# Patient Record
Sex: FEMALE | Race: WHITE | NOT HISPANIC OR LATINO | Employment: PART TIME | URBAN - METROPOLITAN AREA
[De-identification: names, ages, dates, MRNs, and addresses within clinical notes are randomized per-mention and may not be internally consistent; named-entity substitution may affect disease eponyms.]

---

## 2017-01-31 ENCOUNTER — ALLSCRIPTS OFFICE VISIT (OUTPATIENT)
Dept: OTHER | Facility: OTHER | Age: 32
End: 2017-01-31

## 2017-01-31 LAB
BILIRUB UR QL STRIP: NORMAL
CLARITY UR: NORMAL
COLOR UR: NORMAL
GLUCOSE (HISTORICAL): NEGATIVE
HGB UR QL STRIP.AUTO: NEGATIVE
KETONES UR STRIP-MCNC: 80 MG/DL
LEUKOCYTE ESTERASE UR QL STRIP: NORMAL
NITRITE UR QL STRIP: NEGATIVE
PH UR STRIP.AUTO: 5 [PH]
PROT UR STRIP-MCNC: NORMAL MG/DL
SP GR UR STRIP.AUTO: 1.01
UROBILINOGEN UR QL STRIP.AUTO: 0.2

## 2017-02-13 ENCOUNTER — GENERIC CONVERSION - ENCOUNTER (OUTPATIENT)
Dept: OTHER | Facility: OTHER | Age: 32
End: 2017-02-13

## 2017-02-27 ENCOUNTER — GENERIC CONVERSION - ENCOUNTER (OUTPATIENT)
Dept: OTHER | Facility: OTHER | Age: 32
End: 2017-02-27

## 2017-02-27 DIAGNOSIS — Z34.92 ENCOUNTER FOR SUPERVISION OF NORMAL PREGNANCY IN SECOND TRIMESTER: ICD-10-CM

## 2017-02-27 DIAGNOSIS — Z91.89 OTHER SPECIFIED PERSONAL RISK FACTORS, NOT ELSEWHERE CLASSIFIED: ICD-10-CM

## 2017-02-27 DIAGNOSIS — D69.9 HEMORRHAGIC CONDITION (HCC): ICD-10-CM

## 2017-02-27 DIAGNOSIS — N93.9 ABNORMAL UTERINE AND VAGINAL BLEEDING, UNSPECIFIED: ICD-10-CM

## 2017-02-27 PROCEDURE — G0143 SCR C/V CYTO,THINLAYER,RESCR: HCPCS | Performed by: OBSTETRICS & GYNECOLOGY

## 2017-02-27 PROCEDURE — 87624 HPV HI-RISK TYP POOLED RSLT: CPT | Performed by: OBSTETRICS & GYNECOLOGY

## 2017-02-28 ENCOUNTER — LAB REQUISITION (OUTPATIENT)
Dept: LAB | Facility: HOSPITAL | Age: 32
End: 2017-02-28
Payer: COMMERCIAL

## 2017-02-28 DIAGNOSIS — Z34.92 ENCOUNTER FOR SUPERVISION OF NORMAL PREGNANCY IN SECOND TRIMESTER: ICD-10-CM

## 2017-02-28 DIAGNOSIS — Z91.89 OTHER SPECIFIED PERSONAL RISK FACTORS, NOT ELSEWHERE CLASSIFIED: ICD-10-CM

## 2017-03-02 ENCOUNTER — GENERIC CONVERSION - ENCOUNTER (OUTPATIENT)
Dept: OTHER | Facility: OTHER | Age: 32
End: 2017-03-02

## 2017-03-02 LAB — HPV RRNA GENITAL QL NAA+PROBE: NORMAL

## 2017-03-06 LAB
LAB AP GYN PRIMARY INTERPRETATION: NORMAL
Lab: NORMAL

## 2017-03-09 ENCOUNTER — GENERIC CONVERSION - ENCOUNTER (OUTPATIENT)
Dept: OTHER | Facility: OTHER | Age: 32
End: 2017-03-09

## 2017-03-16 ENCOUNTER — GENERIC CONVERSION - ENCOUNTER (OUTPATIENT)
Dept: OTHER | Facility: OTHER | Age: 32
End: 2017-03-16

## 2017-03-23 ENCOUNTER — GENERIC CONVERSION - ENCOUNTER (OUTPATIENT)
Dept: OTHER | Facility: OTHER | Age: 32
End: 2017-03-23

## 2017-03-27 ENCOUNTER — ALLSCRIPTS OFFICE VISIT (OUTPATIENT)
Dept: OTHER | Facility: OTHER | Age: 32
End: 2017-03-27

## 2017-04-10 ENCOUNTER — GENERIC CONVERSION - ENCOUNTER (OUTPATIENT)
Dept: OTHER | Facility: OTHER | Age: 32
End: 2017-04-10

## 2017-04-17 ENCOUNTER — ALLSCRIPTS OFFICE VISIT (OUTPATIENT)
Dept: OTHER | Facility: OTHER | Age: 32
End: 2017-04-17

## 2017-11-13 ENCOUNTER — ALLSCRIPTS OFFICE VISIT (OUTPATIENT)
Dept: OTHER | Facility: OTHER | Age: 32
End: 2017-11-13

## 2017-11-13 LAB
BACTERIA UR QL AUTO: NORMAL
CLUE CELL (HISTORICAL): NORMAL
HYPHAL YEAST (HISTORICAL): NORMAL
KOH PREP (HISTORICAL): NORMAL
TRICHOMONAS (HISTORICAL): NORMAL

## 2017-11-13 PROCEDURE — 87491 CHLMYD TRACH DNA AMP PROBE: CPT | Performed by: NURSE PRACTITIONER

## 2017-11-13 PROCEDURE — 87591 N.GONORRHOEAE DNA AMP PROB: CPT | Performed by: NURSE PRACTITIONER

## 2017-11-14 ENCOUNTER — LAB REQUISITION (OUTPATIENT)
Dept: LAB | Facility: HOSPITAL | Age: 32
End: 2017-11-14
Payer: COMMERCIAL

## 2017-11-14 DIAGNOSIS — Z11.3 ENCOUNTER FOR SCREENING FOR INFECTIONS WITH PREDOMINANTLY SEXUAL MODE OF TRANSMISSION: ICD-10-CM

## 2017-11-15 LAB
CHLAMYDIA DNA CVX QL NAA+PROBE: NORMAL
N GONORRHOEA DNA GENITAL QL NAA+PROBE: NORMAL

## 2018-01-11 NOTE — MISCELLANEOUS
Message   Recorded as Task   Date: 2017 09:55 AM, Created By: Elias Lai   Task Name: Care Coordination   Assigned To: [address] Team   Regarding Patient: Elliot Hernandez, Status: Active   CommentEvesanjuana Millershakira - 28 Mar 2017 9:55 AM     TASK CREATED  [facility] wants to schedule refill of Noank  Please call them back at [phone]   G V  (DEBBIE) [facility] - 2017 11:28 AM     TASK REASSIGNED: Previously Assigned To Mark 91 - 2017 09:55 AM  TASK CREATED  [facility] wants to schedule refill of Noank  Please call them back at [phone]   Billy - 10 Apr 2017 8:16 AM     TASK EDITED  p  c  to [facility] -informed that pt had a miscarriage & will no longer be needing Noank  Active Problems    1  Abnormal vaginal bleeding (623 8) (N93 9)   2  Bacterial vaginosis (616 10,041 9) (N76 0,B96 89)   3  Bleeding diathesis (287 9) (D69 9)   4  Cervical cancer screening (V76 2) (Z12 4)   5  Encounter for initial prescription of contraceptive pills (V25 01) (Z30 011)   6  Encounter for well woman exam (V72 31) (Z01 419)   7  History of  labor (V13 21) (Z87 51)   8  Irregular menstrual cycle (626 4) (N92 6)   9  Potential for bleeding (V49 89) (Z91 89)   10  Prenatal care in second trimester (V22 1) (Z34 92)   11   delivery, delivered (644 21) (O60 10X0)   12  Routine screening for STI (sexually transmitted infection) (V74 5) (Z11 3)    Current Meds   1  Prenatal TABS; Therapy: (Recorded:[id number]) to Recorded    Allergies    1  Latex Exam Gloves MISC    2   Latex    Signatures   Electronically signed by : Preston Estes RN; Apr 10 2017  8:16AM EST                       (Author)

## 2018-01-12 NOTE — RESULT NOTES
Message  Pap done at Dr Solorio [place] office 9/22/2015 WNL with negative HR HPV  Last annual 5/2016 here, reviewed with pt due for her next annual here 5/2017        Signatures   Electronically signed by : NIA Lopes; Sep 28 2016  6:28PM EST                       (Author)

## 2018-01-13 VITALS
HEART RATE: 100 BPM | DIASTOLIC BLOOD PRESSURE: 83 MMHG | SYSTOLIC BLOOD PRESSURE: 126 MMHG | BODY MASS INDEX: 35.06 KG/M2 | WEIGHT: 179.5 LBS

## 2018-01-13 VITALS
DIASTOLIC BLOOD PRESSURE: 72 MMHG | RESPIRATION RATE: 16 BRPM | WEIGHT: 169.5 LBS | HEIGHT: 62 IN | BODY MASS INDEX: 31.19 KG/M2 | HEART RATE: 92 BPM | SYSTOLIC BLOOD PRESSURE: 109 MMHG

## 2018-01-13 VITALS
HEART RATE: 83 BPM | SYSTOLIC BLOOD PRESSURE: 125 MMHG | DIASTOLIC BLOOD PRESSURE: 76 MMHG | BODY MASS INDEX: 32.71 KG/M2 | WEIGHT: 167.5 LBS

## 2018-01-14 VITALS
DIASTOLIC BLOOD PRESSURE: 73 MMHG | HEART RATE: 90 BPM | SYSTOLIC BLOOD PRESSURE: 139 MMHG | WEIGHT: 185 LBS | BODY MASS INDEX: 33.84 KG/M2

## 2018-01-18 NOTE — MISCELLANEOUS
Message  Disha to be delivered on 3/7 by Og Jackson- [phone]  Patient appt for 3/9 for injection  Patient to sign and return paperwork in pre-paid envelope  Active Problems    1  Bacterial vaginosis (616 10,041 9) (N76 0,B96 89)   2  Cervical cancer screening (V76 2) (Z12 4)   3  Encounter for initial prescription of contraceptive pills (V25 01) (Z30 011)   4  Encounter for well woman exam (V72 31) (Z01 419)   5  Irregular menstrual cycle (626 4) (N92 6)   6  Potential for bleeding (V49 89) (Z91 89)   7  Prenatal care in second trimester (V22 1) (Z34 92)   8  Routine screening for STI (sexually transmitted infection) (V74 5) (Z11 3)    Current Meds   1  Prenatal TABS; Therapy: (Recorded:[id number]) to Recorded    Allergies    1  Latex Exam Gloves MISC    2   Latex    Signatures   Electronically signed by : Beatrice Fair, ; Mar  2 2017 11:42AM EST                       (Author)

## 2018-01-22 VITALS
DIASTOLIC BLOOD PRESSURE: 72 MMHG | HEIGHT: 62 IN | BODY MASS INDEX: 31.01 KG/M2 | WEIGHT: 168.5 LBS | RESPIRATION RATE: 16 BRPM | SYSTOLIC BLOOD PRESSURE: 132 MMHG | HEART RATE: 92 BPM

## 2018-01-22 VITALS
DIASTOLIC BLOOD PRESSURE: 74 MMHG | HEART RATE: 95 BPM | SYSTOLIC BLOOD PRESSURE: 110 MMHG | BODY MASS INDEX: 32.21 KG/M2 | WEIGHT: 176.13 LBS

## 2018-01-22 VITALS
WEIGHT: 171 LBS | SYSTOLIC BLOOD PRESSURE: 94 MMHG | HEIGHT: 62 IN | DIASTOLIC BLOOD PRESSURE: 60 MMHG | BODY MASS INDEX: 31.47 KG/M2

## 2018-01-22 VITALS
HEART RATE: 101 BPM | BODY MASS INDEX: 32.08 KG/M2 | WEIGHT: 175.38 LBS | SYSTOLIC BLOOD PRESSURE: 124 MMHG | DIASTOLIC BLOOD PRESSURE: 66 MMHG

## 2018-01-22 VITALS
BODY MASS INDEX: 33.01 KG/M2 | SYSTOLIC BLOOD PRESSURE: 122 MMHG | DIASTOLIC BLOOD PRESSURE: 82 MMHG | WEIGHT: 180.5 LBS | HEART RATE: 83 BPM

## 2018-03-07 NOTE — PROGRESS NOTES
Education  [employer] Education 1st Trimester:   First Trimester Education provided:   benefits of breastfeeding, importance of exclusive breastfeeding, early initiation of breastfeeding, exclusive breastfeeding for the first 6 months and Pregnancy Essentials Reference Guide given   The patient is planning on breastfeeding     Prenatal education provided by: Tam Henley   Electronically signed by : Lakisha Enciso, ; Dec 19 2016  2:52PM EST                       (Author)

## 2018-05-31 ENCOUNTER — OFFICE VISIT (OUTPATIENT)
Dept: OBGYN CLINIC | Facility: CLINIC | Age: 33
End: 2018-05-31
Payer: COMMERCIAL

## 2018-05-31 VITALS
BODY MASS INDEX: 37.62 KG/M2 | HEIGHT: 60 IN | WEIGHT: 191.6 LBS | SYSTOLIC BLOOD PRESSURE: 131 MMHG | DIASTOLIC BLOOD PRESSURE: 83 MMHG | HEART RATE: 78 BPM

## 2018-05-31 DIAGNOSIS — Z30.2 REQUEST FOR STERILIZATION: Primary | ICD-10-CM

## 2018-05-31 PROCEDURE — 99213 OFFICE O/P EST LOW 20 MIN: CPT | Performed by: NURSE PRACTITIONER

## 2018-05-31 NOTE — PROGRESS NOTES
Assessment/Plan:         Diagnoses and all orders for this visit:    Request for sterilization         Bilateral tuballigation consent signed today  Information on Sterilization given to pt  Patient does not want any more children  History of  delivery x2  Subjective:      Patient ID: Tawana Sarmiento is a 34 y o  female  HPI 34year old [id number] here to review contraception  She desires a tubal ligation, she does not want any more children  Currently she is not in a relationship  Patient has history of a  PTD delivery at 21 weeks  on 3/20/2017 and another at 19 wks gestation  She has hx of migraine hA's and hx of OCP use that  made her HA's worse  Menses are currently very regular and last 5 days  Her LMP was 2018  Patient is due for her annual gyn exam 2018  Last pap was on 2017 and was negative with negative HR HPV  The following portions of the patient's history were reviewed and updated as appropriate: allergies, current medications, past family history, past medical history, past social history, past surgical history and problem list     Review of Systems   Respiratory: Negative  Cardiovascular: Negative  Genitourinary: Negative for menstrual problem, pelvic pain and vaginal discharge  Neurological: Negative  Objective:      /83 (BP Location: Left arm, Patient Position: Sitting, Cuff Size: Adult)   Pulse 78   Ht 5' (1 524 m)   Wt 86 9 kg (191 lb 9 6 oz)   LMP 2018 (Exact Date)   Breastfeeding? No   BMI 37 42 kg/m²          Physical Exam   Constitutional: She appears well-nourished  Cardiovascular: Normal rate, regular rhythm and normal heart sounds      Pulmonary/Chest: Breath sounds normal

## 2018-05-31 NOTE — PATIENT INSTRUCTIONS
You will receive a phone call from the [facility] in  [facility] [place] about scheduling your tubal   Annual exam is due 11/2018

## 2019-10-24 ENCOUNTER — ANNUAL EXAM (OUTPATIENT)
Dept: OBGYN CLINIC | Facility: CLINIC | Age: 34
End: 2019-10-24

## 2019-10-24 VITALS
BODY MASS INDEX: 36.32 KG/M2 | DIASTOLIC BLOOD PRESSURE: 73 MMHG | SYSTOLIC BLOOD PRESSURE: 120 MMHG | HEART RATE: 83 BPM | HEIGHT: 60 IN | WEIGHT: 185 LBS

## 2019-10-24 DIAGNOSIS — Z20.2 POSSIBLE EXPOSURE TO STD: ICD-10-CM

## 2019-10-24 DIAGNOSIS — Z01.419 ENCOUNTER FOR GYNECOLOGICAL EXAMINATION (GENERAL) (ROUTINE) WITHOUT ABNORMAL FINDINGS: Primary | ICD-10-CM

## 2019-10-24 DIAGNOSIS — Z30.013 ENCOUNTER FOR INITIAL PRESCRIPTION OF INJECTABLE CONTRACEPTIVE: ICD-10-CM

## 2019-10-24 PROCEDURE — 99395 PREV VISIT EST AGE 18-39: CPT | Performed by: OBSTETRICS & GYNECOLOGY

## 2019-10-24 PROCEDURE — 87591 N.GONORRHOEAE DNA AMP PROB: CPT | Performed by: OBSTETRICS & GYNECOLOGY

## 2019-10-24 PROCEDURE — 87491 CHLMYD TRACH DNA AMP PROBE: CPT | Performed by: OBSTETRICS & GYNECOLOGY

## 2019-10-24 RX ORDER — MEDROXYPROGESTERONE ACETATE 150 MG/ML
150 INJECTION, SUSPENSION INTRAMUSCULAR
Qty: 1 ML | Refills: 1 | Status: SHIPPED | OUTPATIENT
Start: 2019-10-24 | End: 2020-01-23

## 2019-10-24 RX ORDER — BUTALBITAL, ASPIRIN, AND CAFFEINE 50; 325; 40 MG/1; MG/1; MG/1
CAPSULE ORAL
Refills: 0 | COMMUNITY
Start: 2019-09-17 | End: 2020-01-29

## 2019-10-24 RX ORDER — TOPIRAMATE 25 MG/1
TABLET ORAL
Refills: 0 | COMMUNITY
Start: 2019-09-17 | End: 2020-01-29

## 2019-10-24 NOTE — PROGRESS NOTES
ASSESSMENT & PLAN: Yanni Goldstein is a 27 y o  No obstetric history on file  with {NORMAL/ABNORMAL XAZX:94417} gynecologic exam     1   Routine well woman exam done today  2  Pap and HPV:  The patient's last pap and hpv was  19***  It was negative{ NORMAL/ABNORMAL ONLY:52672}  Pap and cotesting {DONE/NOT DONE:4845345367::"was not"} done today  Current ASCCP Guidelines reviewed  ***  3   The following were reviewed in today's visit: {Gyn counselin}  4  ***    CC:  Annual Gynecologic Examination    HPI: Yanni Goldstein is a 27 y o  No obstetric history on file  who presents for annual gynecologic examination  She has hx of PTD at 21 wks and another at 19 wks  At last visit she wanted to get a Tubal   She has the following concerns:  ***    Health Maintenance:    She wears her seatbelt routinely  She {DOES/DOES TJX:48449} perform {Desc; regular/irre} monthly self breast exams  She feels safe at home  Past Medical History:   Diagnosis Date    Gallbladder attack     Kidney stones     Migraines        Past Surgical History:   Procedure Laterality Date    LITHOTRIPSY      TONSILLECTOMY         Past OB/Gyn History:  OB History    None       Pt {HAS/DOES NOT HAVE:03205} menstrual issues  ***   History of sexually transmitted infection: {YES/NO:69627}  History of abnormal pap smears: {YES/NO:69853} ***  Patient {IS/ IS NOT:54242} currently sexually active  {Sexual social history md:55251}  The current method of family planning is {contraception:863812}      Family History   Problem Relation Age of Onset    No Known Problems Mother     No Known Problems Father        Social History:  Social History     Socioeconomic History    Marital status: Single     Spouse name: Not on file    Number of children: Not on file    Years of education: Not on file    Highest education level: Not on file   Occupational History    Not on file   Social Needs    Financial resource strain: Not on file    Food insecurity:     Worry: Not on file     Inability: Not on file    Transportation needs:     Medical: Not on file     Non-medical: Not on file   Tobacco Use    Smoking status: Former Smoker    Smokeless tobacco: Never Used   Substance and Sexual Activity    Alcohol use: No    Drug use: No    Sexual activity: Never     Partners: Male     Birth control/protection: None   Lifestyle    Physical activity:     Days per week: Not on file     Minutes per session: Not on file    Stress: Not on file   Relationships    Social connections:     Talks on phone: Not on file     Gets together: Not on file     Attends Yazdanism service: Not on file     Active member of club or organization: Not on file     Attends meetings of clubs or organizations: Not on file     Relationship status: Not on file    Intimate partner violence:     Fear of current or ex partner: Not on file     Emotionally abused: Not on file     Physically abused: Not on file     Forced sexual activity: Not on file   Other Topics Concern    Not on file   Social History Narrative    Not on file     Presently lives with ***  Patient is {Desc; marital status:62}  Patient is currently {[facility] Employment:92187} ***    Allergies   Allergen Reactions    Latex Anaphylaxis, Hives and Anxiety     Category: Allergy; Annotation - [id number]: HIVES       No current outpatient medications on file  Review of Systems  Constitutional :no fever, feels well, no tiredness, no recent weight gain or loss  ENT: no ear ache, no loss of hearing, no nosebleeds or nasal discharge, no sore throat or hoarseness  Cardiovascular: no complaints of slow or fast heart beat, no chest pain, no palpitations, no leg claudication or lower extremity edema    Respiratory: no complaints of shortness of shortness of breath, no JACOBS  Breasts:no complaints of breast pain, breast lump, or nipple discharge  Gastrointestinal: no complaints of abdominal pain, constipation, nausea, vomiting, or diarrhea or bloody stools  Genitourinary : no complaints of dysuria, incontinence, pelvic pain, no dysmenorrhea, vaginal discharge or abnormal vaginal bleeding and as noted in HPI  Musculoskeletal: no complaints of arthralgia, no myalgia, no joint swelling or stiffness, no limb pain or swelling  Integumentary: no complaints of skin rash or lesion, itching or dry skin  Neurological: no complaints of headache, no confusion, no numbness or tingling, no dizziness or fainting    Objective      There were no vitals taken for this visit  General:   appears stated age, cooperative, alert normal mood and affect   Neck: normal, supple,trachea midline, no masses   Heart: regular rate and rhythm, S1, S2 normal, no murmur, click, rub or gallop   Lungs: clear to auscultation bilaterally   Breasts: {EXAM; BREAST:36070}   Abdomen: soft, non-tender, without masses or organomegaly   Vulva: {EXAM; GYN VDKSL:91883}   Vagina: {exam; vagina:75962}   Urethra: {Urethra:91144}   Cervix: {PE Cervix:62190::"Normal, no discharge "}   Uterus: {exam; uterus:80915}   Adnexa: {exam; adnexa:45897}   Lymphatic palpation of lymph nodes in neck, axilla, groin and/or other locations: no lymphadenopathy or masses noted   Skin normal skin turgor and no rashes     Psychiatric orientation to person, place, and time: normal  mood and affect: normal

## 2019-10-24 NOTE — PROGRESS NOTES
Assessment/Plan:     No problem-specific Assessment & Plan notes found for this encounter  Diagnoses and all orders for this visit:    Encounter for gynecological examination (general) (routine) without abnormal findings    Possible exposure to STD  -     Chlamydia/GC amplified DNA by PCR; Future    Encounter for initial prescription of injectable contraceptive  -     medroxyPROGESTERone (DEPO-PROVERA) 150 mg/mL injection; Inject 1 mL (150 mg total) into a muscle every 3 (three) months    Other orders  -     butalbital-aspirin-caffeine (FIORINAL) -40 mg capsule  -     topiramate (TOPAMAX) 25 mg tablet    Depression Screening Follow-up Plan: Patient's depression screening was positive with a PHQ-2 score of 0  Their PHQ-9 score was   Clinically patient does not have depression  No treatment is required  RTO for DMPA injection  Subjective:      Patient ID: Kym Benton is a 27 y o  female presents for annual exam   Her only complaint today is migraine headache for which she has a neurologist   Her pap smear was 2/27/17 and was negative with negative HPV  She agrees to STD testing  She desires a tubal ligation in the future but wants interval contraception today  HPI    The following portions of the patient's history were reviewed and updated as appropriate: allergies, current medications, past family history, past medical history, past social history, past surgical history and problem list     Review of Systems      Objective:      /73 (BP Location: Left arm, Patient Position: Sitting, Cuff Size: Adult)   Pulse 83   Ht 5' (1 524 m)   Wt 83 9 kg (185 lb)   LMP 10/08/2019   BMI 36 13 kg/m²          Physical Exam   Constitutional: She is oriented to person, place, and time  She appears well-developed and well-nourished  No distress  HENT:   Head: Normocephalic  Neck: No thyromegaly present  Cardiovascular: Normal rate, regular rhythm and normal heart sounds     No murmur heard  Pulmonary/Chest: Effort normal and breath sounds normal  No respiratory distress  She has no wheezes  She has no rales  She exhibits no tenderness  No breast tenderness, discharge or bleeding  Abdominal: Soft  Bowel sounds are normal  She exhibits no distension and no mass  There is no tenderness  There is no rebound and no guarding  Genitourinary: Uterus normal  Rectal exam shows no external hemorrhoid  No breast tenderness, discharge or bleeding  No labial fusion  There is no rash, tenderness, lesion or injury on the right labia  There is no rash, tenderness, lesion or injury on the left labia  Cervix exhibits no motion tenderness, no discharge and no friability  Right adnexum displays no mass, no tenderness and no fullness  Left adnexum displays no mass, no tenderness and no fullness  Musculoskeletal: She exhibits no edema  Lymphadenopathy:        Right: No inguinal adenopathy present  Left: No inguinal adenopathy present  Neurological: She is alert and oriented to person, place, and time  Skin: Skin is warm and dry  Psychiatric: She has a normal mood and affect  Her behavior is normal  Judgment and thought content normal    Nursing note and vitals reviewed

## 2019-10-25 ENCOUNTER — CLINICAL SUPPORT (OUTPATIENT)
Dept: OBGYN CLINIC | Facility: CLINIC | Age: 34
End: 2019-10-25

## 2019-10-25 DIAGNOSIS — Z30.42 ENCOUNTER FOR DEPO-PROVERA CONTRACEPTION: Primary | ICD-10-CM

## 2019-10-25 LAB — SL AMB POCT URINE HCG: NEGATIVE

## 2019-10-25 PROCEDURE — 96372 THER/PROPH/DIAG INJ SC/IM: CPT

## 2019-10-25 PROCEDURE — 81025 URINE PREGNANCY TEST: CPT

## 2019-10-25 RX ORDER — MEDROXYPROGESTERONE ACETATE 150 MG/ML
150 INJECTION, SUSPENSION INTRAMUSCULAR ONCE
Status: COMPLETED | OUTPATIENT
Start: 2019-10-25 | End: 2019-10-25

## 2019-10-25 RX ADMIN — MEDROXYPROGESTERONE ACETATE 150 MG: 150 INJECTION, SUSPENSION INTRAMUSCULAR at 13:16

## 2019-10-25 NOTE — PROGRESS NOTES
Depo-Provera      [x]   Patient provided box    o 1 Refills remain   Last  Annual Date: 10/24/19   Last Depo date: Today is 1st injection   Side effects: no   HCG: yes  o if applicable: negative   Given by: Kelly Webb MA   Site: Left deltoid     Calcium supplement daily teaching, condoms for 2 weeks following first injection dose

## 2019-10-25 NOTE — PATIENT INSTRUCTIONS
Medroxyprogesterone (By injection)   Medroxyprogesterone (om-vctb-fb-proe-KO-ter-one)  Prevents pregnancy  Also treats endometriosis and is used with other medicines to help relieve symptoms of cancer, including uterine or kidney cancer  Brand Name(s): Depo-Provera, Depo-Provera Contraceptive, Depo-SubQ Provera 104   There may be other brand names for this medicine  When This Medicine Should Not Be Used: This medicine is not right for everyone  You should not receive it if you had an allergic reaction to medroxyprogesterone or if you have a history of breast cancer or blood clots (including heart attack or stroke)  In most cases, you should not use this medicine while you are pregnant  How to Use This Medicine:   Injectable  · A nurse or other health provider will give you this medicine  This medicine is given as a shot into a muscle or just under the skin  · Your exact treatment schedule depends on the reason you are using this medicine  You doctor will explain your personal schedule  ¨ For treatment of cancer symptoms, you may start with a shot once per week  You may need fewer shots as your treatment goes forward  ¨ For birth control or endometriosis, you will need a shot every 3 months (13 weeks)  Read and follow the patient instructions that come with this medicine  Talk to your doctor or pharmacist if you have any questions  ¨ You might need to have the first shot during the first 5 days of your normal menstrual period, to make sure you are not pregnant  If you have just had a baby, you may receive a shot 5 days after birth if you are not breastfeeding or 6 weeks after birth if you are breastfeeding  · Missed dose: You must receive a shot every 3 months if you want to prevent pregnancy  Talk to your doctor or pharmacist if you do not receive your medicine on time, because you may need another form of birth control    Drugs and Foods to Avoid:   Ask your doctor or pharmacist before using any other medicine, including over-the-counter medicines, vitamins, and herbal products  · Some foods and medicines can affect how medroxyprogesterone works  Tell your doctor if you are using any of the following:  ¨ Aminoglutethimide, bosentan, carbamazepine, felbamate, griseofulvin, nefazodone, oxcarbazepine, phenobarbital, phenytoin, rifabutin, rifampin, rifapentine, Megan's wort, topiramate  ¨ Medicine to treat an infection (including clarithromycin, itraconazole, ketoconazole, telithromycin, voriconazole)  ¨ Medicine to treat HIV/AIDS (including atazanavir, indinavir, nelfinavir, ritonavir, saquinavir)  Warnings While Using This Medicine:   · Tell your doctor right away if you think you have become pregnant  · Tell your doctor if you are breastfeeding or if you have liver disease, kidney disease, asthma, diabetes, heart disease, seizures, migraine headaches, an eating disorder, osteoporosis, or a history of depression  Tell your doctor if you smoke  · This medicine may cause the following problems:  ¨ Blood clots, which could lead to stroke, heart attack, or other serious problems  ¨ Possible increased risk of breast cancer  ¨ Weak or thin bones, especially with long-term use  · You should not use this medicine for long-term birth control unless you cannot use any other form of birth control  · This medicine will not protect you from HIV/AIDS or other sexually transmitted diseases  · Tell any doctor or dentist who treats you that you are using this medicine  This medicine may affect certain medical test results  · Your doctor will check your progress and the effects of this medicine at regular visits  Keep all appointments    Possible Side Effects While Using This Medicine:   Call your doctor right away if you notice any of these side effects:  · Allergic reaction: Itching or hives, swelling in your face or hands, swelling or tingling in your mouth or throat, chest tightness, trouble breathing  · Chest pain, trouble breathing, or coughing up blood  · Dark urine or pale stools, nausea, vomiting, loss of appetite, stomach pain, yellow skin or eyes  · Heavy or nonstop vaginal bleeding  · Loss of vision, double vision  · Numbness or weakness on one side of your body, sudden or severe headache, problems with vision, speech, or walking  · Severe stomach pain or cramps  If you notice these less serious side effects, talk with your doctor:   · Headache  · Light or missed monthly periods, spotting between periods  · Nervousness or dizziness  · Pain, redness, burning, swelling, or a lump under your skin where the shot was given  · Weight gain  If you notice other side effects that you think are caused by this medicine, tell your doctor  Call your doctor for medical advice about side effects  You may report side effects to FDA at 6-068-FDA-7194  © 2017 [address] Information is for End User's use only and may not be sold, redistributed or otherwise used for commercial purposes  The above information is an  only  It is not intended as medical advice for individual conditions or treatments  Talk to your doctor, nurse or pharmacist before following any medical regimen to see if it is safe and effective for you

## 2019-10-28 LAB
C TRACH DNA SPEC QL NAA+PROBE: NEGATIVE
N GONORRHOEA DNA SPEC QL NAA+PROBE: NEGATIVE

## 2019-10-30 ENCOUNTER — TELEPHONE (OUTPATIENT)
Dept: OBGYN CLINIC | Facility: CLINIC | Age: 34
End: 2019-10-30

## 2020-01-10 ENCOUNTER — TELEPHONE (OUTPATIENT)
Dept: OBGYN CLINIC | Facility: CLINIC | Age: 35
End: 2020-01-10

## 2020-01-21 ENCOUNTER — TELEPHONE (OUTPATIENT)
Dept: OBGYN CLINIC | Facility: CLINIC | Age: 35
End: 2020-01-21

## 2020-01-23 ENCOUNTER — ULTRASOUND (OUTPATIENT)
Dept: OBGYN CLINIC | Facility: CLINIC | Age: 35
End: 2020-01-23

## 2020-01-23 VITALS
SYSTOLIC BLOOD PRESSURE: 121 MMHG | BODY MASS INDEX: 34.96 KG/M2 | WEIGHT: 179 LBS | DIASTOLIC BLOOD PRESSURE: 83 MMHG | HEART RATE: 93 BPM

## 2020-01-23 DIAGNOSIS — Z32.01 POSITIVE PREGNANCY TEST: Primary | ICD-10-CM

## 2020-01-23 PROCEDURE — 99213 OFFICE O/P EST LOW 20 MIN: CPT | Performed by: OBSTETRICS & GYNECOLOGY

## 2020-01-23 NOTE — PROGRESS NOTES
Assessment/Plan:     No problem-specific Assessment & Plan notes found for this encounter  Diagnoses and all orders for this visit:    Positive pregnancy test  -     Ambulatory Referral to Maternal Fetal Medicine; Future    Formal US per MFM  Will take OTC Flinstones vitamins  RTO for OB intake  Subjective:      Patient ID: Maria A Grady is a 27 y o  female  presents with a positive home pregnancy test   Pt was on DMPA and had a negative urine HCG prior to administration  She felt nauseous at home which prompted her to take a home pregnancy test   Pt feels well overall and declines anti-emetics  TA US reveals a single viable IUP in the second trimester, about 15 wks    Will send to [facility] for formal US  HPI    The following portions of the patient's history were reviewed and updated as appropriate: allergies, current medications, past family history, past medical history, past social history, past surgical history and problem list     Review of Systems      Objective:      /83 (BP Location: Left arm, Patient Position: Sitting, Cuff Size: Adult)   Pulse 93   Wt 81 2 kg (179 lb)   LMP 10/08/2019   BMI 34 96 kg/m²          Physical Exam   Constitutional: She is oriented to person, place, and time  She appears well-developed and well-nourished  No distress  Pulmonary/Chest: Effort normal    Neurological: She is alert and oriented to person, place, and time  Psychiatric: She has a normal mood and affect  Her behavior is normal    Nursing note and vitals reviewed

## 2020-01-24 ENCOUNTER — TRANSCRIBE ORDERS (OUTPATIENT)
Dept: LAB | Facility: CLINIC | Age: 35
End: 2020-01-24

## 2020-01-24 ENCOUNTER — APPOINTMENT (OUTPATIENT)
Dept: LAB | Facility: CLINIC | Age: 35
End: 2020-01-24
Payer: COMMERCIAL

## 2020-01-24 ENCOUNTER — ULTRASOUND (OUTPATIENT)
Dept: PERINATAL CARE | Facility: OTHER | Age: 35
End: 2020-01-24
Payer: COMMERCIAL

## 2020-01-24 VITALS
SYSTOLIC BLOOD PRESSURE: 110 MMHG | DIASTOLIC BLOOD PRESSURE: 62 MMHG | HEART RATE: 80 BPM | WEIGHT: 178.13 LBS | HEIGHT: 60 IN | BODY MASS INDEX: 34.97 KG/M2

## 2020-01-24 DIAGNOSIS — Z3A.15 15 WEEKS GESTATION OF PREGNANCY: ICD-10-CM

## 2020-01-24 DIAGNOSIS — O09.522 ELDERLY MULTIGRAVIDA, SECOND TRIMESTER: ICD-10-CM

## 2020-01-24 DIAGNOSIS — O09.522 SUPERVISION OF ELDERLY MULTIGRAVIDA IN SECOND TRIMESTER: Primary | ICD-10-CM

## 2020-01-24 DIAGNOSIS — O09.522 SUPERVISION OF ELDERLY MULTIGRAVIDA IN SECOND TRIMESTER: ICD-10-CM

## 2020-01-24 DIAGNOSIS — O09.892 HISTORY OF PREMATURE DELIVERY, CURRENTLY PREGNANT, SECOND TRIMESTER: Primary | ICD-10-CM

## 2020-01-24 DIAGNOSIS — O99.212 MATERNAL OBESITY, ANTEPARTUM, SECOND TRIMESTER: ICD-10-CM

## 2020-01-24 PROCEDURE — 99242 OFF/OP CONSLTJ NEW/EST SF 20: CPT | Performed by: OBSTETRICS & GYNECOLOGY

## 2020-01-24 PROCEDURE — 76805 OB US >/= 14 WKS SNGL FETUS: CPT | Performed by: OBSTETRICS & GYNECOLOGY

## 2020-01-24 PROCEDURE — 36415 COLL VENOUS BLD VENIPUNCTURE: CPT

## 2020-01-24 NOTE — PROGRESS NOTES
The patient was given BoihirgF13 paperwork and kit  I gave her the  and requested she call prior to having the blood work drawn  She verbalizes understanding  I contacted Kellen Narvaez from [facility], per Dr Rubia Durbin, to notify her of his reccomendation to [facility]

## 2020-01-24 NOTE — LETTER
January 25, 2020     [address]  PROVIDER    Patient: Salud Rosenthal   YOB: 1985   Date of Visit: 1/24/2020       Dear   Provider: Thank you for referring Stacy Alejandre to me for evaluation  Below are my notes for this consultation  If you have questions, please do not hesitate to call me  I look forward to following your patient along with you           Sincerely,        Ed Muir MD        CC: No Recipients

## 2020-01-25 PROBLEM — O99.212 MATERNAL OBESITY, ANTEPARTUM, SECOND TRIMESTER: Status: ACTIVE | Noted: 2020-01-25

## 2020-01-25 PROBLEM — O09.892 HISTORY OF PREMATURE DELIVERY, CURRENTLY PREGNANT, SECOND TRIMESTER: Status: ACTIVE | Noted: 2020-01-25

## 2020-01-25 PROBLEM — Z3A.15 15 WEEKS GESTATION OF PREGNANCY: Status: ACTIVE | Noted: 2020-01-25

## 2020-01-25 RX ORDER — ASPIRIN 81 MG/1
162 TABLET, CHEWABLE ORAL DAILY
Qty: 180 TABLET | Refills: 1 | Status: SHIPPED | OUTPATIENT
Start: 2020-01-25 | End: 2020-06-24 | Stop reason: HOSPADM

## 2020-01-25 NOTE — PROGRESS NOTES
Thank you very much for your kind referral of Concetta Howard for for fetal ultrasound evaluation and MFM consult at [facility] on 2020  Leonela Marcano is a 39-year-old  patient who is currently at 13 and 3/7 weeks gestation by an estimated due date of 2020 which is based upon menstrual dating  Consultation is performed for the indications of advanced maternal age and prior spontaneous  birth  She conceived this pregnancy while taking Depo-Provera   Lucina had a small amount of vaginal bleeding in mid December, with no recent recurrence  History taking does not provide exact details  Lucina has an apparent history of three prior term vaginal deliveries 17, 15, and 4 years ago  She has a history of a 35 week spontaneous  birth, along with a 21+ week spontaneous  birth in 2017 and another 23 week  spontaneous  birth  Each of her living children is currently healthy  One of her daughters has von Willebrand's disease   The father of that child also has vWD, but is not the father of the baby in her current pregnancy  She also has a history of two therapeutic abortions  Lucina has a current BMI of 34 7  She has a history of migraine headaches  Topamax was discontinued for this indication in late November   Her past surgical history is significant for a cholecystectomy, tonsillectomy, and lithotripsy  She denies tobacco, alcohol, or illicit drug use  Her mom has diabetes  There is no other first-degree family member with a diagnosis of diabetes, hypertension, venous thromboembolism, or preeclampsia  No fetal structural abnormality or ultrasound marker for aneuploidy is identified on the ultrasound study today  Multiple anatomic targets are suboptimally imaged secondary to the early gestational age and maternal body habitus  Fetal growth and amniotic fluid volume are normal   The placenta is normal in appearance   Today's ultrasound findings and suggested follow-up were discussed in detail with Lucina  Age 27, her risk for a live-born baby with Down syndrome is one in 56, with a risk for a live-born baby with any chromosomal abnormality of one in 46  We discussed that definitive prenatal diagnosis at this stage of the pregnancy is possible only with genetic amniocentesis, and discussed the small procedure related risk for pregnancy loss  We discussed the option of genetic screening using cell free DNA analysis which is not diagnostic, but which has a sensitivity for identification of Down syndrome of 99%  Lucina has opted for genetic screening using cell free DNA analysis  Results will be available in about 10 days  Detailed MFM fetal anatomy assessment will be performed at about 20 weeks gestation  Maternal obesity is associated with an increased risk for adverse pregnancy outcomes, including gestational diabetes, fetal growth abnormalities including macrosomia, fetal structural abnormalities, preeclampsia, venous thromboembolism, stillbirth, and increased likelihood for  section  Serial fetal growth evaluations are recommended during the second half of the pregnancy for this indication, along with the indications of advanced maternal age and prior spontaneous  birth, each associated with an increased risk for fetal growth abnormalities  Weekly non stress testing is recommended for additional pregnancy surveillance beginning at 36 weeks gestation, sooner if otherwise indicated, with a BMI of 40 or greater  Early screening for gestational diabetes should be considered, with repeat evaluation between 24 and 28 weeks gestation, if initially negative   Her age, BMI, and prior adverse pregnancy outcome increase the risk for preeclampsia  I recommended, and ordered, prophylaxis with 162 mg of aspirin a day, which will significantly reduce her risk  Aspirin therapy is recommended until delivery   The strongest predictor of  birth (PTB) is a prior spontaneous  birth (sPTB)  Spontaneous  birth (sPTB) recurs in 28 to 48 percent of pregnancies, and tends to recur at similar gestational ages  Based upon the study of Stephen et al, women with a prior sPTB between 12 and 36 weeks of gestation should be offered empiric prophylactic treatment with weekly IM progesterone therapy (17 P)  The initial dose of 17 P should be started at 16 weeks of gestation optimally  17 P should be given every week  17 P should be continued through 36 weeks of gestation  Based upon the Meis study, administration of 17 P will reduce Lucina's risk for recurrence of sPTB by about 30 percent  Lucina and I discussed the recently published PROLONG study, which showed no benefit  of treatment with 17 P in reduction of recurrence of sPTB  Currently, the FDA is reviewing data related to use of 17 P (Disha) during pregnancy  Current recommendations by ACOG and SMFM suggest continuation of prior guidelines with respect to use of 17 P during pregnancy until the FDA review is complete  Women with a prior sPTB between 16 and 36 weeks of gestation should undergo cervical length (CL) screening via transvaginal ultrasound (TVU)  The initial TVU should be obtained at around 16 weeks gestation  Serial CL measurements should be obtained every 2 weeks until 24 weeks of gestation  Women found to have a CL less than or equal to 25 mm at less than 24 weeks gestation should be offered an ultrasound indicated cerclage  Lucina would like to receive treatment with 17 P, which will be ordered through your office  She has been scheduled for serial TVU in the [facility], [facility]  The face to face time, in addition to time spent discussing ultrasound results, was approximately 30 minutes, greater than 50% of which was spent during counseling and coordination of care

## 2020-01-28 ENCOUNTER — INITIAL PRENATAL (OUTPATIENT)
Dept: OBGYN CLINIC | Facility: CLINIC | Age: 35
End: 2020-01-28

## 2020-01-28 VITALS
DIASTOLIC BLOOD PRESSURE: 78 MMHG | WEIGHT: 180.6 LBS | RESPIRATION RATE: 16 BRPM | SYSTOLIC BLOOD PRESSURE: 117 MMHG | HEIGHT: 60 IN | HEART RATE: 88 BPM | BODY MASS INDEX: 35.46 KG/M2

## 2020-01-28 DIAGNOSIS — Z3A.15 15 WEEKS GESTATION OF PREGNANCY: Primary | ICD-10-CM

## 2020-01-28 PROBLEM — Z3A.16 16 WEEKS GESTATION OF PREGNANCY: Status: ACTIVE | Noted: 2020-01-25

## 2020-01-28 LAB — MISCELLANEOUS LAB TEST RESULT: NORMAL

## 2020-01-28 PROCEDURE — T1001 NURSING ASSESSMENT/EVALUATN: HCPCS

## 2020-01-28 RX ORDER — SUMATRIPTAN 25 MG/1
25 TABLET, FILM COATED ORAL ONCE AS NEEDED
COMMUNITY
End: 2022-06-22

## 2020-01-28 NOTE — LETTER
[address]  1985  [address]  [facility] [address]       01/28/20          Sherrie A Marrion Crigler is a patient and under our care in our office  Lucina Barrett's Estimated Date of Delivery: 07/24/2020  Any questions or concerns feel free to contact our office       Thank you,    [address] [facility] [place],[address]  [id number] [place] [address]/[facility]  [address] [facility]/Carlos Ceballos [facility]александр [address] [place] [address]/[address]  [phone]

## 2020-01-28 NOTE — LETTER
Dentist Letter    Nicole Phyllis  1985  [facility] [address] [id number]          01/28/20    We have had several requests from local dentist requesting permission to perform procedures on our patients who are pregnant  We wish to respond with this letter regarding some of the more routine procedures that we have been asked about  The following procedures may be performed on our obstetric patients:   1  Administration of local anesthesia   2  Administration of antibiotics such as PCN, Ampicillin, and Erythromycin  3  Administration of pain medications such as Tylenol, Tylenol with codeine, and if needed Percocet  4  Shielded X-rays    Should you have any questions, please do not hesitate to contact at [phone]          Sincerely,    [facility]  [phone]

## 2020-01-28 NOTE — LETTER
Proof of Pregnancy Letter    Deidra Hendrickson  1985  [place]        01/28/20      Lucina MIGUE Nava is a patient at our facility  Lucinaалександр Joyaromario Bills Estimated Date of Delivery: 07/24/2020       Any questions or concerns, please feel free to contact our office      Sincerely,     [facility]   [address],   [facility] [facility] [place], [address] [place] [address] [place]   [phone]

## 2020-01-28 NOTE — PROGRESS NOTES
OB INTAKE INTERVIEW  Pt presents for OB intake  [id number]  OB History    Para Term  AB Living   7 4 3 1 2 3   SAB TAB Ectopic Multiple Live Births   0 0 0 0 3      # Outcome Date GA Lbr Juan/2nd Weight Sex Delivery Anes PTL Lv   7 Current            6             5 AB            4 AB            3 Term            2 Term            1 Term              Hx of  delivery prior to 36 weeks 6 days:  Yes   If yes, place a referral for cervical surveillance at 16 weeks  Last Menstrual Period:    Patient's last menstrual period was 10/08/2019 (approximate)  Ultrasound date: 2020  15 weeks 3 days     Estimated Date of Delivery: None noted  Confirmed by LMP or US    H&P visit scheduled  2020     Last pap smear: 2017     Findings; lab pap smear results: no abnormalities    Current Issues:  Constipation :   No  Headaches :   Yes  Cramping:  No  Spotting :   No  PICA cravings :  No    FOB Involved:   Yes  Planned pregnancy:  No    I have these concerns about this prenatal patient:   Patient got pregnant after first depo shot  Previous history of pre-term deliveries  [place] recommended by [facility]  Prior authorization process started, patient is not sure she wants to get the injections  States she feels it is up to god at this point and she is at peace with whatever way the pregnancy goes  She is set up for cervical observation at [facility]  Interview education   [address] [facility]'s Pregnancy Essentials Book reviewed and discussed    Baby and Me Handout   Baby and Me [facility] Handout   [facility] Handout   Discussed genetic testing   Prenatal lab work: Scripts printed and given to pt   Influenza vaccine given today: No   Discussed Tdap vaccine  Immunizations: There is no immunization history on file for this patient    Depression Screening Follow-up Plan: Patient's depression screening was negative with an [place] score of  5  Clinically patient does not have depression  No treatment is required       Nurse/Family Partnership- referral placed:  No   If yes, place referral for case management, Negro Keller  BMI Counseling: Body mass index is 35 27 kg/m²  Infection Screening: Does the pt have a hx of MRSA? No  If yes- please follow MRSA protocol and obtain a nasal swab for MRSA culture    The patient was oriented to our practice and all questions were answered    Interviewed by: Claire Kohler RN 01/28/20

## 2020-01-29 ENCOUNTER — ROUTINE PRENATAL (OUTPATIENT)
Dept: PERINATAL CARE | Facility: OTHER | Age: 35
End: 2020-01-29
Payer: COMMERCIAL

## 2020-01-29 VITALS
HEART RATE: 93 BPM | SYSTOLIC BLOOD PRESSURE: 110 MMHG | DIASTOLIC BLOOD PRESSURE: 70 MMHG | BODY MASS INDEX: 35.32 KG/M2 | HEIGHT: 60 IN | WEIGHT: 179.9 LBS

## 2020-01-29 DIAGNOSIS — O09.892 HISTORY OF PREMATURE DELIVERY, CURRENTLY PREGNANT, SECOND TRIMESTER: Primary | ICD-10-CM

## 2020-01-29 DIAGNOSIS — Z3A.16 16 WEEKS GESTATION OF PREGNANCY: ICD-10-CM

## 2020-01-29 PROCEDURE — 99212 OFFICE O/P EST SF 10 MIN: CPT | Performed by: OBSTETRICS & GYNECOLOGY

## 2020-01-29 PROCEDURE — 76817 TRANSVAGINAL US OBSTETRIC: CPT | Performed by: OBSTETRICS & GYNECOLOGY

## 2020-01-29 NOTE — PROGRESS NOTES
The patient was seen today for an ultrasound  Please see ultrasound report (located under Ob Procedures) for additional details  Thank you very much for allowing us to participate in the care of this very nice patient  Should you have any questions, please do not hesitate to contact me  Meng Sheridan MD [place]  Attending Physician, Fab[place]

## 2020-01-29 NOTE — PROGRESS NOTES
A transvaginal ultrasound was performed  Sonographer note on use of High Level Disinfection Process (Trophon) for transvaginal probe# 2 used, serial [id number]    Ana Garduno RDMS

## 2020-01-30 ENCOUNTER — TELEPHONE (OUTPATIENT)
Dept: PERINATAL CARE | Facility: CLINIC | Age: 35
End: 2020-01-30

## 2020-01-30 NOTE — TELEPHONE ENCOUNTER
L/M for patient regarding negative cell free fetal DNA test result for trisomy 21, 13 and 18  Did not leave fetal sex on voicemail, but told patient results are available in [place] if she wanted that information, or she can call  Informed patient that MSAFP needs to be drawn between 16-18 weeks gestation and labslip will be mailed to her  Encouraged her to call with any questions or concerns

## 2020-01-30 NOTE — TELEPHONE ENCOUNTER
Patient called back and confirmed date of birth  Discussed negative cell free fetal DNA test results for Trisomy 21, 13 and 18  Patient desired to learn fetal sex and was informed that it is male; to be confirmed at anatomy ultrasound  Informed patient that MSAFP screening needs to be done prior to 20 weeks gestation  Patient stated she has an ultrasound on 2/14/20 at [address] [place] [address] and asked to  MSAFP labslip at that time to take to lab for blood draw  Sent message to [facility] as she is nurse at [employer], asking her to fill out labslip and keep at nurses station until 2/14/20

## 2020-02-06 ENCOUNTER — ROUTINE PRENATAL (OUTPATIENT)
Dept: OBGYN CLINIC | Facility: CLINIC | Age: 35
End: 2020-02-06

## 2020-02-06 VITALS
WEIGHT: 180 LBS | SYSTOLIC BLOOD PRESSURE: 107 MMHG | HEART RATE: 91 BPM | DIASTOLIC BLOOD PRESSURE: 70 MMHG | BODY MASS INDEX: 35.15 KG/M2

## 2020-02-06 DIAGNOSIS — O09.292 HISTORY OF DELIVERY BY VACUUM EXTRACTION, CURRENTLY PREGNANT IN SECOND TRIMESTER: ICD-10-CM

## 2020-02-06 DIAGNOSIS — O09.522 AMA (ADVANCED MATERNAL AGE) MULTIGRAVIDA 35+, SECOND TRIMESTER: ICD-10-CM

## 2020-02-06 DIAGNOSIS — O09.892 HISTORY OF PREMATURE DELIVERY, CURRENTLY PREGNANT, SECOND TRIMESTER: ICD-10-CM

## 2020-02-06 DIAGNOSIS — O99.212 MATERNAL OBESITY, ANTEPARTUM, SECOND TRIMESTER: ICD-10-CM

## 2020-02-06 DIAGNOSIS — Z11.3 ROUTINE SCREENING FOR STI (SEXUALLY TRANSMITTED INFECTION): ICD-10-CM

## 2020-02-06 DIAGNOSIS — G43.909 MIGRAINE WITHOUT STATUS MIGRAINOSUS, NOT INTRACTABLE, UNSPECIFIED MIGRAINE TYPE: ICD-10-CM

## 2020-02-06 DIAGNOSIS — Z3A.17 17 WEEKS GESTATION OF PREGNANCY: Primary | ICD-10-CM

## 2020-02-06 DIAGNOSIS — Z28.21 REFUSED INFLUENZA VACCINE: ICD-10-CM

## 2020-02-06 PROCEDURE — 87591 N.GONORRHOEAE DNA AMP PROB: CPT | Performed by: GENERAL PRACTICE

## 2020-02-06 PROCEDURE — PNV: Performed by: OBSTETRICS & GYNECOLOGY

## 2020-02-06 PROCEDURE — 87491 CHLMYD TRACH DNA AMP PROBE: CPT | Performed by: GENERAL PRACTICE

## 2020-02-06 NOTE — PROGRESS NOTES
Sundeep Yoo is a 27 y o  female being seen today for her initial obstetrical visit for H&P, this is an unplanned but welcome pregnancy  Pt was on Depo, last injection in 2018  Topamax D/C in 2019  Patient found out about preganacy 2 weeks ago "mid-15w" GA  Endorses taking prenatal vitamins since the day after she learned about pregnancy  Influenza vaccine refused  She is at 17w5d gestation  Patient reports migraines HA q3-4w lasting 7-9d, not relieved by anything, managed by PCP  Fetal movement: not perceived yet  Denies vaginal bleeding and leakage  Hx of spontaneous at 22w in 2017, and distant first trimester , s/p 1 vacuum assisted followed by 2 SVDs (1  at 35w healthy), 16 & 15 y/o daughters (youngest Vonwillibrand disease from previous relationship) and 3 y/o son  Feels safe at home, works at [employer]  Menstrual History:  OB History        7    Para   4    Term   3       1    AB   2    Living   3       SAB   0    TAB   0    Ectopic   0    Multiple   0    Live Births   3                Menarche age: 14 y/o  Patient's last menstrual period was 10/08/2019 (approximate)  PSH: Appendectomy  Social hx: Denies x3    The following portions of the patient's history were reviewed and updated as appropriate: allergies, current medications, past family history, past medical history, past social history, past surgical history and problem list     Review of Systems  Pertinent items are noted in HPI  Objective     /70 (BP Location: Left arm, Patient Position: Sitting, Cuff Size: Adult)   Pulse 91   Wt 81 6 kg (180 lb)   LMP 10/08/2019 (Approximate) Comment: after depo,  BMI 35 15 kg/m²     Physical Exam   Constitutional: She is oriented to person, place, and time  She appears well-developed and well-nourished  No distress  obese   HENT:   Head: Normocephalic and atraumatic     Mouth/Throat: Oropharynx is clear and moist  No oropharyngeal exudate  Eyes: Conjunctivae are normal    Neck: No tracheal deviation present  No thyromegaly present  Cardiovascular: Normal rate, regular rhythm and normal heart sounds  No murmur heard  Pulmonary/Chest: Effort normal  No respiratory distress  She has no wheezes  Abdominal: Soft    gravid   Musculoskeletal: Normal range of motion  She exhibits no edema  Neurological: She is alert and oriented to person, place, and time  No cranial nerve deficit  Skin: Skin is warm and dry  Psychiatric: She has a normal mood and affect  Vitals reviewed  FHT: 80    Assessment     28 y/o [id number] at 17w5d w/ hx of vacuum assisted vaginal delivery, premature delivery, migraine HA and obesity, doing well no complain  Plan    Problem List Items Addressed This Visit        Cardiovascular and Mediastinum    Migraine     · Managed by PCP, recently d/c Topamax in 2019  · Consider referral to neurology for management options while pregnant  Other    Maternal obesity, antepartum, second trimester    History of premature delivery, currently pregnant, second trimester     · Followed by MFM  · Disha discussed, patient refused  17 weeks gestation of pregnancy - Primary     · Continue routine prenatal care  · F/u in 4 weeks  · Patient instructed to get her prenatal labs done  · Precautions discussed  · Consent for delivery obtained -  anticipated  · All questions answered           History of delivery by vacuum extraction, currently pregnant in second trimester    AMA (advanced maternal age) multigravida 33+, second trimester      Other Visit Diagnoses     Refused influenza vaccine

## 2020-02-06 NOTE — ASSESSMENT & PLAN NOTE
· Managed by PCP, recently d/c Topamax in November 2019  · Consider referral to neurology for management options while pregnant

## 2020-02-06 NOTE — ASSESSMENT & PLAN NOTE
· Continue routine prenatal care  · F/u in 4 weeks  · Patient instructed to get her prenatal labs done  · Precautions discussed  · Consent for delivery obtained -  anticipated  · All questions answered

## 2020-02-07 LAB
C TRACH DNA SPEC QL NAA+PROBE: NEGATIVE
N GONORRHOEA DNA SPEC QL NAA+PROBE: NEGATIVE

## 2020-02-14 ENCOUNTER — ROUTINE PRENATAL (OUTPATIENT)
Dept: PERINATAL CARE | Facility: OTHER | Age: 35
End: 2020-02-14
Payer: COMMERCIAL

## 2020-02-14 VITALS
HEIGHT: 60 IN | SYSTOLIC BLOOD PRESSURE: 109 MMHG | HEART RATE: 88 BPM | WEIGHT: 180.56 LBS | DIASTOLIC BLOOD PRESSURE: 72 MMHG | BODY MASS INDEX: 35.45 KG/M2

## 2020-02-14 DIAGNOSIS — O44.40 LOW-LYING PLACENTA: ICD-10-CM

## 2020-02-14 DIAGNOSIS — Z36.86 ENCOUNTER FOR ANTENATAL SCREENING FOR CERVICAL LENGTH: ICD-10-CM

## 2020-02-14 DIAGNOSIS — O09.892 HISTORY OF PREMATURE DELIVERY, CURRENTLY PREGNANT, SECOND TRIMESTER: ICD-10-CM

## 2020-02-14 DIAGNOSIS — Z3A.18 18 WEEKS GESTATION OF PREGNANCY: Primary | ICD-10-CM

## 2020-02-14 PROBLEM — O09.292: Status: RESOLVED | Noted: 2020-02-06 | Resolved: 2020-02-14

## 2020-02-14 PROBLEM — Z3A.17 17 WEEKS GESTATION OF PREGNANCY: Status: RESOLVED | Noted: 2020-01-25 | Resolved: 2020-02-14

## 2020-02-14 PROBLEM — O43.199 MARGINAL INSERTION OF UMBILICAL CORD AFFECTING MANAGEMENT OF MOTHER: Status: ACTIVE | Noted: 2020-02-14

## 2020-02-14 PROCEDURE — 76815 OB US LIMITED FETUS(S): CPT | Performed by: OBSTETRICS & GYNECOLOGY

## 2020-02-14 PROCEDURE — 99211 OFF/OP EST MAY X REQ PHY/QHP: CPT | Performed by: OBSTETRICS & GYNECOLOGY

## 2020-02-14 PROCEDURE — 76817 TRANSVAGINAL US OBSTETRIC: CPT | Performed by: OBSTETRICS & GYNECOLOGY

## 2020-02-14 NOTE — PROGRESS NOTES
[address] [place] [facility] Ms Chandu Pedraza was seen today at 18w6d for serial cervical length screening ultrasound  See ultrasound report under "OB Procedures" tab    Please don't hesitate to contact our office with any concerns or questions   -Rolo Fowler MD

## 2020-02-14 NOTE — PROGRESS NOTES
A transvaginal ultrasound was performed  Sonographer note on use of High Level Disinfection Process (Trophon) for transvaginal probe# 1 used, serial [id number]    Antonio Kay

## 2020-02-18 ENCOUNTER — TELEPHONE (OUTPATIENT)
Dept: PERINATAL CARE | Facility: OTHER | Age: 35
End: 2020-02-18

## 2020-02-18 NOTE — TELEPHONE ENCOUNTER
I attempted to call the patient today to inform her that she didn't take the [facility] paperwork with her at her last appointment (2/14/20), that was prepared for her per Tarik Jones  The patient did not answer and I was unable to leave a voicemail due to the absence of a medical communication form  I will attempt to call the patient again

## 2020-02-21 ENCOUNTER — TELEPHONE (OUTPATIENT)
Dept: PERINATAL CARE | Facility: OTHER | Age: 35
End: 2020-02-21

## 2020-02-21 NOTE — TELEPHONE ENCOUNTER
I attempted to call the patient again to have her  her [facility] paperwork, but there was no answer  I was unable to leave a voicemail, due to the absence of a communication consent

## 2020-02-28 ENCOUNTER — TELEPHONE (OUTPATIENT)
Dept: PERINATAL CARE | Facility: OTHER | Age: 35
End: 2020-02-28

## 2020-02-28 NOTE — TELEPHONE ENCOUNTER
LEFT MESSAGE FOR PATIENT TO CALL AND RESCHEDULE HER LEVEL 2   SHE NO SHOWED FOR HER LEVEL 2 APPT  I ALSO LEFT HER A MESSAGE REGARDING HER MSAFP

## 2020-03-04 NOTE — PROGRESS NOTES
Salud Rosenthal presents today for routine OB visit at 21w5d   She reports went to get her PN labs at [facility] but they had several prenatal panels available so did not have labs done  I provided her with note of  what is in our prenatal panel and she will complete this week  She declines MSAFP  Denies uterine contractions or persistent cramping  Denies vaginal bleeding or leaking of fluid  Reports fetal movement  Unplanned but welcome pregnancy, patient was on Depo her last injection was 10/2018  patient with history of spontaneous delivery at 22 weeks in 2017  Also delivery at 35 wks  Pt declines progesterone therapy  Needs serial transvaginal ultrasound for cervical length every 2 weeks,   Scheduled for next ultrasound 3/12/2020- had ultrasound done 02/14/2020,  Placenta posterior and low-lying,  Cervical length 4 6 cm  AMA- considering sterilization  Obesity-serial fetal growth, APFS at 36 wks, daily LDASA   refused flu vaccine  Reviewed common discomforts of pregnancy in second trimester and warning signs  Advised to continue prenatal vitamins and return in 4 weeks      pregnancy  Problems (from 01/23/20 to present)     Problem Noted Resolved    17 weeks gestation of pregnancy 1/25/2020 by Jefferson Alva MD 2/14/2020 by Jean Stephens MD

## 2020-03-05 ENCOUNTER — ROUTINE PRENATAL (OUTPATIENT)
Dept: OBGYN CLINIC | Facility: CLINIC | Age: 35
End: 2020-03-05

## 2020-03-05 VITALS
HEIGHT: 60 IN | DIASTOLIC BLOOD PRESSURE: 69 MMHG | BODY MASS INDEX: 36.63 KG/M2 | SYSTOLIC BLOOD PRESSURE: 109 MMHG | HEART RATE: 98 BPM | WEIGHT: 186.6 LBS

## 2020-03-05 DIAGNOSIS — O99.212 MATERNAL OBESITY, ANTEPARTUM, SECOND TRIMESTER: ICD-10-CM

## 2020-03-05 DIAGNOSIS — O43.199 MARGINAL INSERTION OF UMBILICAL CORD AFFECTING MANAGEMENT OF MOTHER: ICD-10-CM

## 2020-03-05 DIAGNOSIS — O09.892 HISTORY OF PREMATURE DELIVERY, CURRENTLY PREGNANT, SECOND TRIMESTER: Primary | ICD-10-CM

## 2020-03-05 DIAGNOSIS — Z3A.21 21 WEEKS GESTATION OF PREGNANCY: ICD-10-CM

## 2020-03-05 DIAGNOSIS — O09.522 AMA (ADVANCED MATERNAL AGE) MULTIGRAVIDA 35+, SECOND TRIMESTER: ICD-10-CM

## 2020-03-05 LAB
SL AMB  POCT GLUCOSE, UA: NEGATIVE
SL AMB POCT URINE PROTEIN: NEGATIVE

## 2020-03-05 PROCEDURE — 81002 URINALYSIS NONAUTO W/O SCOPE: CPT | Performed by: NURSE PRACTITIONER

## 2020-03-05 PROCEDURE — 99213 OFFICE O/P EST LOW 20 MIN: CPT | Performed by: NURSE PRACTITIONER

## 2020-03-05 NOTE — PATIENT INSTRUCTIONS
Pregnancy at 23 to 26 Weeks   AMBULATORY CARE:   What changes are happening to your body: You are now close to or at the beginning of the third trimester  The third trimester starts at 24 weeks and ends with delivery  As your baby gets larger, you may develop certain symptoms  These may include pain in your back or down the sides of your abdomen  You may also have stretch marks on your abdomen, breasts, thighs, or buttocks  You may also have constipation  Seek care immediately if:   · You develop a severe headache that does not go away  · You have new or increased vision changes, such as blurred or spotted vision  · You have new or increased swelling in your face or hands  · You have vaginal spotting or bleeding  · Your water broke or you feel warm water gushing or trickling from your vagina  Contact your healthcare provider if:   · You have abdominal cramps, pressure, or tightening  · You have a change in vaginal discharge  · You have light bleeding  · You have chills or a fever  · You have vaginal itching, burning, or pain  · You have yellow, green, white, or foul-smelling vaginal discharge  · You have pain or burning when you urinate, less urine than usual, or pink or bloody urine  · You have questions or concerns about your condition or care  How to care for yourself at this stage of your pregnancy:   · Eat a variety of healthy foods  Healthy foods include fruits, vegetables, whole-grain breads, low-fat dairy foods, beans, lean meats, and fish  Drink liquids as directed  Ask how much liquid to drink each day and which liquids are best for you  Limit caffeine to less than 200 milligrams each day  Limit your intake of fish to 2 servings each week  Choose fish low in mercury such as canned light tuna, shrimp, salmon, cod, or tilapia  Do not  eat fish high in mercury such as swordfish, tilefish, mike mackerel, and shark  · Manage back pain    Do not stand for long periods of time or lift heavy items  Use good posture while you stand, squat, or bend  Wear low-heeled shoes with good support  Rest may also help to relieve back pain  Ask your healthcare provider about exercises you can do to strengthen your back muscles  · Take prenatal vitamins as directed  Your need for certain vitamins and minerals, such as folic acid, increases during pregnancy  Prenatal vitamins provide some of the extra vitamins and minerals you need  Prenatal vitamins may also help to decrease the risk of certain birth defects  · Talk to your healthcare provider about exercise  Moderate exercise can help you stay fit  Your healthcare provider will help you plan an exercise program that is safe for you during pregnancy  · Do not smoke  If you smoke, it is never too late to quit  Smoking increases your risk of a miscarriage and other health problems during your pregnancy  Smoking can cause your baby to be born too early or weigh less at birth  Ask your healthcare provider for information if you need help quitting  · Do not drink alcohol  Alcohol passes from your body to your baby through the placenta  It can affect your baby's brain development and cause fetal alcohol syndrome (FAS)  FAS is a group of conditions that causes mental, behavior, and growth problems  · Talk to your healthcare provider before you take any medicines  Many medicines may harm your baby if you take them when you are pregnant  Do not take any medicines, vitamins, herbs, or supplements without first talking to your healthcare provider  Never use illegal or street drugs (such as marijuana or cocaine) while you are pregnant  Safety tips during pregnancy:   · Avoid hot tubs and saunas  Do not use a hot tub or sauna while you are pregnant, especially during your first trimester  Hot tubs and saunas may raise your baby's temperature and increase the risk of birth defects  · Avoid toxoplasmosis    This is an infection caused by eating raw meat or being around infected cat feces  It can cause birth defects, miscarriages, and other problems  Wash your hands after you touch raw meat  Make sure any meat is well-cooked before you eat it  Avoid raw eggs and unpasteurized milk  Use gloves or ask someone else to clean your cat's litter box while you are pregnant  Changes that are happening with your baby:  By 26 weeks, your baby will weigh about 2 pounds  Your baby will be about 10 inches long from the top of the head to the rump (baby's bottom)  Your baby's movements are much stronger now  Your baby's eyes are almost completely formed and can partially open  Your baby also sleeps and wakes up  What you need to know about prenatal care: Your healthcare provider will check your blood pressure and weight  You may also need the following:  · A urine test  may also be done to check for sugar and protein  These can be signs of gestational diabetes or infection  Protein in your urine may also be a sign of preeclampsia  Preeclampsia is a condition that can develop during week 20 or later of your pregnancy  It causes high blood pressure, and it can cause problems with your kidneys and other organs  · Fundal height  is a measurement of your uterus to check your baby's growth  This number is usually the same as the number of weeks that you have been pregnant  · Your baby's heart rate  will be checked  © 2017 [address] [employer] Information is for End User's use only and may not be sold, redistributed or otherwise used for commercial purposes  All illustrations and images included in CareNotes® are the copyrighted property of [employer]  or Jignesh Escobedo  The above information is an  only  It is not intended as medical advice for individual conditions or treatments  Talk to your doctor, nurse or pharmacist before following any medical regimen to see if it is safe and effective for you    Pregnancy at 19 to 22 Weeks   AMBULATORY CARE:   What changes are happening to your body:  Now that you are in your second trimester, you have more energy  You may also be feeling hungrier than usual  You may be gaining about ½ to 1 pound a week, and your pregnancy is beginning to show  You may need to start wearing maternity clothes  As your baby gets larger, you may have other symptoms  These may include body aches or stretch marks on your abdomen, breasts, thighs, or buttocks  Seek care immediately if:   · You develop a severe headache that does not go away  · You have new or increased vision changes, such as blurred or spotted vision  · You have new or increased swelling in your face or hands  · You have vaginal spotting or bleeding  · Your water broke or you feel warm water gushing or trickling from your vagina  Contact your healthcare provider if:   · You have abdominal cramps, pressure, or tightening  · You have a change in vaginal discharge  · You cannot keep food or drinks down, and you are losing weight  · You have chills or a fever  · You have vaginal itching, burning, or pain  · You have yellow, green, white, or foul-smelling vaginal discharge  · You have pain or burning when you urinate, less urine than usual, or pink or bloody urine  · You have questions or concerns about your condition or care  How to care for yourself at this stage of your pregnancy:   · Eat a variety of healthy foods  Healthy foods include fruits, vegetables, whole-grain breads, low-fat dairy foods, beans, lean meats, and fish  Drink liquids as directed  Ask how much liquid to drink each day and which liquids are best for you  Limit caffeine to less than 200 milligrams each day  Limit your intake of fish to 2 servings each week  Choose fish low in mercury such as canned light tuna, shrimp, salmon, cod, or tilapia  Do not  eat fish high in mercury such as swordfish, tilefish, mike mackerel, and shark  · Take prenatal vitamins as directed  Your need for certain vitamins and minerals, such as folic acid, increases during pregnancy  Prenatal vitamins provide some of the extra vitamins and minerals you need  Prenatal vitamins may also help to decrease the risk of certain birth defects  · Talk to your healthcare provider about exercise  Moderate exercise can help you stay fit  Your healthcare provider will help you plan an exercise program that is safe for you during pregnancy  · Do not smoke  If you smoke, it is never too late to quit  Smoking increases your risk of a miscarriage and other health problems during your pregnancy  Smoking can cause your baby to be born too early or weigh less at birth  Ask your healthcare provider for information if you need help quitting  · Do not drink alcohol  Alcohol passes from your body to your baby through the placenta  It can affect your baby's brain development and cause fetal alcohol syndrome (FAS)  FAS is a group of conditions that causes mental, behavior, and growth problems  · Talk to your healthcare provider before you take any medicines  Many medicines may harm your baby if you take them when you are pregnant  Do not take any medicines, vitamins, herbs, or supplements without first talking to your healthcare provider  Never use illegal or street drugs (such as marijuana or cocaine) while you are pregnant  Safety tips during pregnancy:   · Avoid hot tubs and saunas  Do not use a hot tub or sauna while you are pregnant, especially during your first trimester  Hot tubs and saunas may raise your baby's temperature and increase the risk of birth defects  · Avoid toxoplasmosis  This is an infection caused by eating raw meat or being around infected cat feces  It can cause birth defects, miscarriages, and other problems  Wash your hands after you touch raw meat  Make sure any meat is well-cooked before you eat it   Avoid raw eggs and unpasteurized milk  Use gloves or ask someone else to clean your cat's litter box while you are pregnant  Changes that are happening with your baby:  By 22 weeks, your baby is about 8 inches long from the top of the head to the rump (baby's bottom)  Your baby also weighs about 1 pound  Your baby is becoming much more active  You may be able to feel the baby move inside you now  The first movements may not be that noticeable  They may feel like a fluttering sensation  As time goes on, your baby's movements will become stronger and more noticeable  What you need to know about prenatal care:  During the first 28 weeks of your pregnancy, you will see your healthcare provider once a month  Your healthcare provider will check your blood pressure and weight  You may also need the following:  · A urine test  may also be done to check for sugar and protein  These can be signs of gestational diabetes or infection  Protein in your urine may also be a sign of preeclampsia  Preeclampsia is a condition that can develop during week 20 or later of your pregnancy  It causes high blood pressure, and it can cause problems with your kidneys and other organs  · Fundal height  is a measurement of your uterus to check your baby's growth  This number is usually the same as the number of weeks that you have been pregnant  · A fetal ultrasound  shows pictures of your baby inside your uterus  It shows your baby's development  The movement and position of your baby can also be seen  Your healthcare provider may be able to tell you what your baby's gender is during the ultrasound  · Your baby's heart rate  will be checked  © 2017 [facility] Information is for End User's use only and may not be sold, redistributed or otherwise used for commercial purposes  All illustrations and images included in CareNotes® are the copyrighted property of [employer] M [employer]  or Jignesh Escobedo    The above information is an  only  It is not intended as medical advice for individual conditions or treatments  Talk to your doctor, nurse or pharmacist before following any medical regimen to see if it is safe and effective for you

## 2020-03-10 ENCOUNTER — TELEPHONE (OUTPATIENT)
Dept: OBGYN CLINIC | Facility: CLINIC | Age: 35
End: 2020-03-10

## 2020-03-10 PROBLEM — Z28.39 RUBELLA NON-IMMUNE STATUS, ANTEPARTUM: Status: ACTIVE | Noted: 2020-03-10

## 2020-03-10 PROBLEM — O09.899 RUBELLA NON-IMMUNE STATUS, ANTEPARTUM: Status: ACTIVE | Noted: 2020-03-10

## 2020-03-12 ENCOUNTER — ROUTINE PRENATAL (OUTPATIENT)
Dept: PERINATAL CARE | Facility: OTHER | Age: 35
End: 2020-03-12
Payer: COMMERCIAL

## 2020-03-12 VITALS
BODY MASS INDEX: 36.57 KG/M2 | DIASTOLIC BLOOD PRESSURE: 75 MMHG | HEART RATE: 94 BPM | SYSTOLIC BLOOD PRESSURE: 127 MMHG | HEIGHT: 60 IN | WEIGHT: 186.29 LBS

## 2020-03-12 DIAGNOSIS — Z36.3 ENCOUNTER FOR ANTENATAL SCREENING FOR MALFORMATIONS: ICD-10-CM

## 2020-03-12 DIAGNOSIS — O09.522 AMA (ADVANCED MATERNAL AGE) MULTIGRAVIDA 35+, SECOND TRIMESTER: Primary | ICD-10-CM

## 2020-03-12 DIAGNOSIS — Z03.75 ENCOUNTER FOR SUSPECTED CERVICAL SHORTENING RULED OUT: ICD-10-CM

## 2020-03-12 DIAGNOSIS — O09.899 HISTORY OF PRETERM DELIVERY, CURRENTLY PREGNANT: ICD-10-CM

## 2020-03-12 PROBLEM — O44.40 LOW-LYING PLACENTA: Status: RESOLVED | Noted: 2020-02-14 | Resolved: 2020-03-12

## 2020-03-12 PROCEDURE — 76811 OB US DETAILED SNGL FETUS: CPT | Performed by: OBSTETRICS & GYNECOLOGY

## 2020-03-12 PROCEDURE — 76817 TRANSVAGINAL US OBSTETRIC: CPT | Performed by: OBSTETRICS & GYNECOLOGY

## 2020-03-12 PROCEDURE — 99212 OFFICE O/P EST SF 10 MIN: CPT | Performed by: OBSTETRICS & GYNECOLOGY

## 2020-03-12 NOTE — PROGRESS NOTES
A transvaginal ultrasound was performed  Sonographer note on use of High Level Disinfection Process (Trophon) for transvaginal probe# 1 used, serial [id number]    Richy Balderas, RDMS

## 2020-03-12 NOTE — PATIENT INSTRUCTIONS
Thank you for choosing us for your  care today  If you have any questions about your ultrasound or care, please do not hesitate to contact us or your primary obstetrician  GET YOUR FLU SHOT! Continue taking your aspirin 162mg daily for prevention of preeclampsia  If you have asthma and notice an increase in symptom frequency or severity, consider stopping this medication  Some general instructions for your pregnancy are:     Exercise: we encourage most pregnant women to get regular physical activity in pregnancy  Exercise has been shown to reduce the risk of several pregnancy-related complications  Unless instructed otherwise, you can aim for 22 minutes per day (150 minutes per week! )   Nutrition: aim for calcium-rich and iron-rich foods as well as healthy sources of protein   Weight: ask your doctor what is the appropriate amount of weight for you to gain in pregnancy  We have nutritionists here if you would like to meet with them   Protect against the flu: get yourself and your entire household vaccinated against influenza  Tell your partner to get vaccinated as well  Good hand hygiene can reduce the spread of this potentially deadly virus  Insist that everyone who is going to hold or be around your baby get vaccinated   Learn about Preeclampsia: preeclampsia is a common, serious complication in pregnancy  A blood pressure of 140mmHg (top number or systolic) OR 17NXBC (bottom number or diastolic) is elevated and needs evaluation by your doctor  Ask your doctor early in pregnancy if you should take aspirin (not motrin or tylenol) to prevent preeclampsia  If you were advised to take aspirin to prevent preeclampsia, a daily dose of 162mg or 81mg is advised  One resource to learn more is www  preeclampsia org    If you smoke, try to reduce how many cigarettes you smoke or quit completely  Do not vape       Other warning signs to watch out for in pregnancy or postpartum: chest pain, obstructed breathing or shortness of breath, seizures, thoughts of hurting yourself or your baby, bleeding, a painful or swollen leg, fever, or headache ([facility] POST-BIRTH Warning Signs campaign)  If these happen call 911  Itching is also not normal in pregnancy and if you experience this, especially over your hands and feet, potentially worse at night, notify your doctors

## 2020-03-12 NOTE — PROGRESS NOTES
[address]: Ms Ulysses Hatch was seen today at 22w5d for anatomic survey and cervical length screening ultrasound  See ultrasound report under "OB Procedures" tab  Please don't hesitate to contact our office with any concerns or questions    Calvin Stoner MD

## 2020-04-01 PROBLEM — Z3A.25 25 WEEKS GESTATION OF PREGNANCY: Status: ACTIVE | Noted: 2020-02-14

## 2020-04-02 ENCOUNTER — ROUTINE PRENATAL (OUTPATIENT)
Dept: OBGYN CLINIC | Facility: CLINIC | Age: 35
End: 2020-04-02

## 2020-04-02 VITALS
DIASTOLIC BLOOD PRESSURE: 74 MMHG | SYSTOLIC BLOOD PRESSURE: 119 MMHG | WEIGHT: 188 LBS | HEART RATE: 96 BPM | BODY MASS INDEX: 36.72 KG/M2

## 2020-04-02 DIAGNOSIS — O09.899 RUBELLA NON-IMMUNE STATUS, ANTEPARTUM: ICD-10-CM

## 2020-04-02 DIAGNOSIS — O09.892 HISTORY OF PREMATURE DELIVERY, CURRENTLY PREGNANT, SECOND TRIMESTER: ICD-10-CM

## 2020-04-02 DIAGNOSIS — Z3A.25 25 WEEKS GESTATION OF PREGNANCY: ICD-10-CM

## 2020-04-02 DIAGNOSIS — O43.199 MARGINAL INSERTION OF UMBILICAL CORD AFFECTING MANAGEMENT OF MOTHER: Primary | ICD-10-CM

## 2020-04-02 DIAGNOSIS — Z28.39 RUBELLA NON-IMMUNE STATUS, ANTEPARTUM: ICD-10-CM

## 2020-04-02 DIAGNOSIS — O99.212 MATERNAL OBESITY, ANTEPARTUM, SECOND TRIMESTER: ICD-10-CM

## 2020-04-02 DIAGNOSIS — Z30.2 REQUEST FOR STERILIZATION: ICD-10-CM

## 2020-04-02 DIAGNOSIS — O09.522 AMA (ADVANCED MATERNAL AGE) MULTIGRAVIDA 35+, SECOND TRIMESTER: ICD-10-CM

## 2020-04-02 PROCEDURE — 99213 OFFICE O/P EST LOW 20 MIN: CPT | Performed by: OBSTETRICS & GYNECOLOGY

## 2020-04-09 ENCOUNTER — TELEPHONE (OUTPATIENT)
Dept: OBGYN CLINIC | Facility: CLINIC | Age: 35
End: 2020-04-09

## 2020-04-09 ENCOUNTER — TELEPHONE (OUTPATIENT)
Dept: PERINATAL CARE | Facility: OTHER | Age: 35
End: 2020-04-09

## 2020-04-14 ENCOUNTER — TELEPHONE (OUTPATIENT)
Dept: PERINATAL CARE | Facility: OTHER | Age: 35
End: 2020-04-14

## 2020-04-15 ENCOUNTER — TELEPHONE (OUTPATIENT)
Dept: PERINATAL CARE | Facility: CLINIC | Age: 35
End: 2020-04-15

## 2020-04-28 ENCOUNTER — TELEMEDICINE (OUTPATIENT)
Dept: OBGYN CLINIC | Facility: CLINIC | Age: 35
End: 2020-04-28

## 2020-04-28 DIAGNOSIS — Z28.39 RUBELLA NON-IMMUNE STATUS, ANTEPARTUM: Primary | ICD-10-CM

## 2020-04-28 DIAGNOSIS — O09.899 RUBELLA NON-IMMUNE STATUS, ANTEPARTUM: Primary | ICD-10-CM

## 2020-04-28 DIAGNOSIS — Z3A.29 29 WEEKS GESTATION OF PREGNANCY: ICD-10-CM

## 2020-04-28 PROCEDURE — G2012 BRIEF CHECK IN BY MD/QHP: HCPCS | Performed by: OBSTETRICS & GYNECOLOGY

## 2020-05-11 PROBLEM — Z3A.31 31 WEEKS GESTATION OF PREGNANCY: Status: ACTIVE | Noted: 2020-02-14

## 2020-05-12 ENCOUNTER — ROUTINE PRENATAL (OUTPATIENT)
Dept: OBGYN CLINIC | Facility: CLINIC | Age: 35
End: 2020-05-12

## 2020-05-12 VITALS
HEART RATE: 103 BPM | WEIGHT: 190 LBS | SYSTOLIC BLOOD PRESSURE: 124 MMHG | BODY MASS INDEX: 37.11 KG/M2 | DIASTOLIC BLOOD PRESSURE: 79 MMHG

## 2020-05-12 DIAGNOSIS — Z3A.31 31 WEEKS GESTATION OF PREGNANCY: ICD-10-CM

## 2020-05-12 DIAGNOSIS — Z30.2 REQUEST FOR STERILIZATION: Primary | ICD-10-CM

## 2020-05-12 DIAGNOSIS — O09.899 RUBELLA NON-IMMUNE STATUS, ANTEPARTUM: ICD-10-CM

## 2020-05-12 DIAGNOSIS — Z28.39 RUBELLA NON-IMMUNE STATUS, ANTEPARTUM: ICD-10-CM

## 2020-05-12 DIAGNOSIS — O09.522 AMA (ADVANCED MATERNAL AGE) MULTIGRAVIDA 35+, SECOND TRIMESTER: ICD-10-CM

## 2020-05-12 DIAGNOSIS — Z23 NEED FOR TDAP VACCINATION: ICD-10-CM

## 2020-05-12 PROCEDURE — 90715 TDAP VACCINE 7 YRS/> IM: CPT | Performed by: OBSTETRICS & GYNECOLOGY

## 2020-05-12 PROCEDURE — 90471 IMMUNIZATION ADMIN: CPT | Performed by: OBSTETRICS & GYNECOLOGY

## 2020-05-12 PROCEDURE — 99213 OFFICE O/P EST LOW 20 MIN: CPT | Performed by: OBSTETRICS & GYNECOLOGY

## 2020-05-19 ENCOUNTER — TELEPHONE (OUTPATIENT)
Dept: PERINATAL CARE | Facility: CLINIC | Age: 35
End: 2020-05-19

## 2020-05-20 ENCOUNTER — ULTRASOUND (OUTPATIENT)
Dept: PERINATAL CARE | Facility: CLINIC | Age: 35
End: 2020-05-20
Payer: COMMERCIAL

## 2020-05-20 VITALS
BODY MASS INDEX: 37.5 KG/M2 | DIASTOLIC BLOOD PRESSURE: 62 MMHG | HEIGHT: 60 IN | HEART RATE: 86 BPM | TEMPERATURE: 97.5 F | WEIGHT: 191 LBS | SYSTOLIC BLOOD PRESSURE: 117 MMHG

## 2020-05-20 DIAGNOSIS — O43.199 MARGINAL INSERTION OF UMBILICAL CORD AFFECTING MANAGEMENT OF MOTHER: ICD-10-CM

## 2020-05-20 DIAGNOSIS — O09.523 AMA (ADVANCED MATERNAL AGE) MULTIGRAVIDA 35+, THIRD TRIMESTER: ICD-10-CM

## 2020-05-20 DIAGNOSIS — Z3A.32 32 WEEKS GESTATION OF PREGNANCY: ICD-10-CM

## 2020-05-20 PROCEDURE — 76816 OB US FOLLOW-UP PER FETUS: CPT | Performed by: OBSTETRICS & GYNECOLOGY

## 2020-05-22 ENCOUNTER — TELEPHONE (OUTPATIENT)
Dept: OBGYN CLINIC | Facility: CLINIC | Age: 35
End: 2020-05-22

## 2020-05-26 ENCOUNTER — ROUTINE PRENATAL (OUTPATIENT)
Dept: OBGYN CLINIC | Facility: CLINIC | Age: 35
End: 2020-05-26

## 2020-05-26 VITALS
BODY MASS INDEX: 38.28 KG/M2 | DIASTOLIC BLOOD PRESSURE: 79 MMHG | WEIGHT: 196 LBS | SYSTOLIC BLOOD PRESSURE: 127 MMHG | HEART RATE: 103 BPM

## 2020-05-26 DIAGNOSIS — Z3A.33 33 WEEKS GESTATION OF PREGNANCY: Primary | ICD-10-CM

## 2020-05-26 PROCEDURE — 99213 OFFICE O/P EST LOW 20 MIN: CPT | Performed by: FAMILY MEDICINE

## 2020-06-09 ENCOUNTER — ROUTINE PRENATAL (OUTPATIENT)
Dept: OBGYN CLINIC | Facility: CLINIC | Age: 35
End: 2020-06-09

## 2020-06-09 ENCOUNTER — TELEPHONE (OUTPATIENT)
Dept: OBGYN CLINIC | Facility: CLINIC | Age: 35
End: 2020-06-09

## 2020-06-09 VITALS
WEIGHT: 197 LBS | DIASTOLIC BLOOD PRESSURE: 75 MMHG | BODY MASS INDEX: 38.47 KG/M2 | SYSTOLIC BLOOD PRESSURE: 112 MMHG | HEART RATE: 99 BPM

## 2020-06-09 DIAGNOSIS — Z3A.35 35 WEEKS GESTATION OF PREGNANCY: ICD-10-CM

## 2020-06-09 DIAGNOSIS — O09.893 HISTORY OF PRETERM DELIVERY, CURRENTLY PREGNANT, THIRD TRIMESTER: Primary | ICD-10-CM

## 2020-06-09 DIAGNOSIS — Z28.39 RUBELLA NON-IMMUNE STATUS, ANTEPARTUM: ICD-10-CM

## 2020-06-09 DIAGNOSIS — O09.523 AMA (ADVANCED MATERNAL AGE) MULTIGRAVIDA 35+, THIRD TRIMESTER: ICD-10-CM

## 2020-06-09 DIAGNOSIS — O09.899 RUBELLA NON-IMMUNE STATUS, ANTEPARTUM: ICD-10-CM

## 2020-06-09 PROCEDURE — 99215 OFFICE O/P EST HI 40 MIN: CPT | Performed by: NURSE PRACTITIONER

## 2020-06-16 ENCOUNTER — ROUTINE PRENATAL (OUTPATIENT)
Dept: OBGYN CLINIC | Facility: CLINIC | Age: 35
End: 2020-06-16

## 2020-06-16 VITALS
BODY MASS INDEX: 39.45 KG/M2 | DIASTOLIC BLOOD PRESSURE: 88 MMHG | TEMPERATURE: 97.8 F | WEIGHT: 202 LBS | SYSTOLIC BLOOD PRESSURE: 133 MMHG | HEART RATE: 112 BPM

## 2020-06-16 DIAGNOSIS — O09.899 RUBELLA NON-IMMUNE STATUS, ANTEPARTUM: ICD-10-CM

## 2020-06-16 DIAGNOSIS — O09.523 AMA (ADVANCED MATERNAL AGE) MULTIGRAVIDA 35+, THIRD TRIMESTER: ICD-10-CM

## 2020-06-16 DIAGNOSIS — O09.893 HISTORY OF PRETERM DELIVERY, CURRENTLY PREGNANT, THIRD TRIMESTER: Primary | ICD-10-CM

## 2020-06-16 DIAGNOSIS — Z28.39 RUBELLA NON-IMMUNE STATUS, ANTEPARTUM: ICD-10-CM

## 2020-06-16 DIAGNOSIS — O99.213 MATERNAL OBESITY SYNDROME IN THIRD TRIMESTER: ICD-10-CM

## 2020-06-16 DIAGNOSIS — Z3A.36 36 WEEKS GESTATION OF PREGNANCY: ICD-10-CM

## 2020-06-16 PROCEDURE — 87653 STREP B DNA AMP PROBE: CPT | Performed by: NURSE PRACTITIONER

## 2020-06-16 PROCEDURE — 99215 OFFICE O/P EST HI 40 MIN: CPT | Performed by: NURSE PRACTITIONER

## 2020-06-18 ENCOUNTER — TELEPHONE (OUTPATIENT)
Dept: OBGYN CLINIC | Facility: CLINIC | Age: 35
End: 2020-06-18

## 2020-06-18 LAB — GP B STREP DNA SPEC QL NAA+PROBE: NORMAL

## 2020-06-22 ENCOUNTER — HOSPITAL ENCOUNTER (INPATIENT)
Facility: HOSPITAL | Age: 35
LOS: 2 days | Discharge: HOME | DRG: 560 | End: 2020-06-24
Attending: OBSTETRICS & GYNECOLOGY | Admitting: OBSTETRICS & GYNECOLOGY
Payer: COMMERCIAL

## 2020-06-22 ENCOUNTER — ANESTHESIA (INPATIENT)
Dept: ANESTHESIOLOGY | Facility: HOSPITAL | Age: 35
DRG: 560 | End: 2020-06-22
Payer: COMMERCIAL

## 2020-06-22 ENCOUNTER — ANESTHESIA EVENT (INPATIENT)
Dept: ANESTHESIOLOGY | Facility: HOSPITAL | Age: 35
DRG: 560 | End: 2020-06-22
Payer: COMMERCIAL

## 2020-06-22 DIAGNOSIS — Z3A.37 37 WEEKS GESTATION OF PREGNANCY: ICD-10-CM

## 2020-06-22 LAB
ABO GROUP BLD: NORMAL
ABO GROUP BLD: NORMAL
BASE EXCESS BLDCOA CALC-SCNC: -0.7 MMOL/L (ref 3–11)
BASE EXCESS BLDCOV CALC-SCNC: -0.8 MMOL/L (ref 1–9)
BASOPHILS # BLD AUTO: 0.04 THOUSANDS/ΜL (ref 0–0.1)
BASOPHILS NFR BLD AUTO: 0 % (ref 0–1)
BLD GP AB SCN SERPL QL: NEGATIVE
EOSINOPHIL # BLD AUTO: 0.05 THOUSAND/ΜL (ref 0–0.61)
EOSINOPHIL NFR BLD AUTO: 1 % (ref 0–6)
ERYTHROCYTE [DISTWIDTH] IN BLOOD BY AUTOMATED COUNT: 13.7 % (ref 11.6–15.1)
GLUCOSE SERPL-MCNC: 100 MG/DL (ref 65–140)
HCO3 BLDCOA-SCNC: 26.2 MMOL/L (ref 17.3–27.3)
HCO3 BLDCOV-SCNC: 23.8 MMOL/L (ref 12.2–28.6)
HCT VFR BLD AUTO: 34 % (ref 34.8–46.1)
HGB BLD-MCNC: 10.9 G/DL (ref 11.5–15.4)
IMM GRANULOCYTES # BLD AUTO: 0.06 THOUSAND/UL (ref 0–0.2)
IMM GRANULOCYTES NFR BLD AUTO: 1 % (ref 0–2)
LYMPHOCYTES # BLD AUTO: 1.48 THOUSANDS/ΜL (ref 0.6–4.47)
LYMPHOCYTES NFR BLD AUTO: 15 % (ref 14–44)
MCH RBC QN AUTO: 29.7 PG (ref 26.8–34.3)
MCHC RBC AUTO-ENTMCNC: 32.1 G/DL (ref 31.4–37.4)
MCV RBC AUTO: 93 FL (ref 82–98)
MONOCYTES # BLD AUTO: 0.6 THOUSAND/ΜL (ref 0.17–1.22)
MONOCYTES NFR BLD AUTO: 6 % (ref 4–12)
NEUTROPHILS # BLD AUTO: 7.5 THOUSANDS/ΜL (ref 1.85–7.62)
NEUTS SEG NFR BLD AUTO: 77 % (ref 43–75)
NRBC BLD AUTO-RTO: 0 /100 WBCS
O2 CT VFR BLDCOA CALC: 12 ML/DL
OXYHGB MFR BLDCOA: 57 %
OXYHGB MFR BLDCOV: 58.3 %
PCO2 BLDCOA: 51.6 MM[HG] (ref 30–60)
PCO2 BLDCOV: 39.2 MM HG (ref 27–43)
PH BLDCOA: 7.32 [PH] (ref 7.23–7.43)
PH BLDCOV: 7.4 [PH] (ref 7.19–7.49)
PLATELET # BLD AUTO: 214 THOUSANDS/UL (ref 149–390)
PMV BLD AUTO: 10.9 FL (ref 8.9–12.7)
PO2 BLDCOA: 25.1 MM HG (ref 5–25)
PO2 BLDCOV: 24.8 MM HG (ref 15–45)
RBC # BLD AUTO: 3.67 MILLION/UL (ref 3.81–5.12)
RH BLD: POSITIVE
RH BLD: POSITIVE
RPR SER QL: NORMAL
SAO2 % BLDCOV: 11.3 ML/DL
SPECIMEN EXPIRATION DATE: NORMAL
WBC # BLD AUTO: 9.73 THOUSAND/UL (ref 4.31–10.16)

## 2020-06-22 PROCEDURE — 85025 COMPLETE CBC W/AUTO DIFF WBC: CPT | Performed by: STUDENT IN AN ORGANIZED HEALTH CARE EDUCATION/TRAINING PROGRAM

## 2020-06-22 PROCEDURE — 82805 BLOOD GASES W/O2 SATURATION: CPT | Performed by: OBSTETRICS & GYNECOLOGY

## 2020-06-22 PROCEDURE — 86901 BLOOD TYPING SEROLOGIC RH(D): CPT | Performed by: STUDENT IN AN ORGANIZED HEALTH CARE EDUCATION/TRAINING PROGRAM

## 2020-06-22 PROCEDURE — 99214 OFFICE O/P EST MOD 30 MIN: CPT

## 2020-06-22 PROCEDURE — 86900 BLOOD TYPING SEROLOGIC ABO: CPT | Performed by: STUDENT IN AN ORGANIZED HEALTH CARE EDUCATION/TRAINING PROGRAM

## 2020-06-22 PROCEDURE — 86850 RBC ANTIBODY SCREEN: CPT | Performed by: STUDENT IN AN ORGANIZED HEALTH CARE EDUCATION/TRAINING PROGRAM

## 2020-06-22 PROCEDURE — 82948 REAGENT STRIP/BLOOD GLUCOSE: CPT

## 2020-06-22 PROCEDURE — NC001 PR NO CHARGE: Performed by: STUDENT IN AN ORGANIZED HEALTH CARE EDUCATION/TRAINING PROGRAM

## 2020-06-22 PROCEDURE — 86592 SYPHILIS TEST NON-TREP QUAL: CPT | Performed by: STUDENT IN AN ORGANIZED HEALTH CARE EDUCATION/TRAINING PROGRAM

## 2020-06-22 RX ORDER — OXYTOCIN/RINGER'S LACTATE 30/500 ML
250 PLASTIC BAG, INJECTION (ML) INTRAVENOUS CONTINUOUS
Status: ACTIVE | OUTPATIENT
Start: 2020-06-22 | End: 2020-06-22

## 2020-06-22 RX ORDER — ONDANSETRON 2 MG/ML
4 INJECTION INTRAMUSCULAR; INTRAVENOUS EVERY 8 HOURS PRN
Status: DISCONTINUED | OUTPATIENT
Start: 2020-06-22 | End: 2020-06-24 | Stop reason: HOSPADM

## 2020-06-22 RX ORDER — OXYTOCIN/RINGER'S LACTATE 30/500 ML
PLASTIC BAG, INJECTION (ML) INTRAVENOUS
Status: COMPLETED
Start: 2020-06-22 | End: 2020-06-22

## 2020-06-22 RX ORDER — DIAPER,BRIEF,INFANT-TODD,DISP
1 EACH MISCELLANEOUS 4 TIMES DAILY PRN
Status: DISCONTINUED | OUTPATIENT
Start: 2020-06-22 | End: 2020-06-24 | Stop reason: HOSPADM

## 2020-06-22 RX ORDER — OXYTOCIN/RINGER'S LACTATE 30/500 ML
1-30 PLASTIC BAG, INJECTION (ML) INTRAVENOUS
Status: DISCONTINUED | OUTPATIENT
Start: 2020-06-22 | End: 2020-06-22

## 2020-06-22 RX ORDER — SODIUM CHLORIDE, SODIUM LACTATE, POTASSIUM CHLORIDE, CALCIUM CHLORIDE 600; 310; 30; 20 MG/100ML; MG/100ML; MG/100ML; MG/100ML
125 INJECTION, SOLUTION INTRAVENOUS CONTINUOUS
Status: DISCONTINUED | OUTPATIENT
Start: 2020-06-22 | End: 2020-06-22

## 2020-06-22 RX ORDER — IBUPROFEN 600 MG/1
600 TABLET ORAL EVERY 6 HOURS PRN
Status: DISCONTINUED | OUTPATIENT
Start: 2020-06-22 | End: 2020-06-24 | Stop reason: HOSPADM

## 2020-06-22 RX ORDER — OXYCODONE HYDROCHLORIDE 5 MG/1
5 TABLET ORAL EVERY 4 HOURS PRN
Status: DISCONTINUED | OUTPATIENT
Start: 2020-06-22 | End: 2020-06-24 | Stop reason: HOSPADM

## 2020-06-22 RX ORDER — DOCUSATE SODIUM 100 MG/1
100 CAPSULE, LIQUID FILLED ORAL 2 TIMES DAILY
Status: DISCONTINUED | OUTPATIENT
Start: 2020-06-22 | End: 2020-06-24 | Stop reason: HOSPADM

## 2020-06-22 RX ORDER — DIPHENHYDRAMINE HCL 25 MG
25 TABLET ORAL EVERY 6 HOURS PRN
Status: DISCONTINUED | OUTPATIENT
Start: 2020-06-22 | End: 2020-06-24 | Stop reason: HOSPADM

## 2020-06-22 RX ORDER — ROPIVACAINE HYDROCHLORIDE 2 MG/ML
INJECTION, SOLUTION EPIDURAL; INFILTRATION; PERINEURAL AS NEEDED
Status: DISCONTINUED | OUTPATIENT
Start: 2020-06-22 | End: 2020-06-22 | Stop reason: SURG

## 2020-06-22 RX ORDER — SIMETHICONE 80 MG
80 TABLET,CHEWABLE ORAL 4 TIMES DAILY PRN
Status: DISCONTINUED | OUTPATIENT
Start: 2020-06-22 | End: 2020-06-24 | Stop reason: HOSPADM

## 2020-06-22 RX ORDER — ONDANSETRON 2 MG/ML
4 INJECTION INTRAMUSCULAR; INTRAVENOUS EVERY 6 HOURS PRN
Status: DISCONTINUED | OUTPATIENT
Start: 2020-06-22 | End: 2020-06-22

## 2020-06-22 RX ORDER — ACETAMINOPHEN 325 MG/1
325 TABLET ORAL EVERY 4 HOURS PRN
Status: DISCONTINUED | OUTPATIENT
Start: 2020-06-22 | End: 2020-06-24 | Stop reason: HOSPADM

## 2020-06-22 RX ORDER — CALCIUM CARBONATE 200(500)MG
1000 TABLET,CHEWABLE ORAL DAILY PRN
Status: DISCONTINUED | OUTPATIENT
Start: 2020-06-22 | End: 2020-06-24 | Stop reason: HOSPADM

## 2020-06-22 RX ORDER — LIDOCAINE HYDROCHLORIDE AND EPINEPHRINE 15; 5 MG/ML; UG/ML
INJECTION, SOLUTION EPIDURAL
Status: COMPLETED | OUTPATIENT
Start: 2020-06-22 | End: 2020-06-22

## 2020-06-22 RX ORDER — MAGNESIUM HYDROXIDE/ALUMINUM HYDROXICE/SIMETHICONE 120; 1200; 1200 MG/30ML; MG/30ML; MG/30ML
15 SUSPENSION ORAL EVERY 6 HOURS PRN
Status: DISCONTINUED | OUTPATIENT
Start: 2020-06-22 | End: 2020-06-24 | Stop reason: HOSPADM

## 2020-06-22 RX ORDER — ACETAMINOPHEN 325 MG/1
650 TABLET ORAL EVERY 6 HOURS PRN
Status: DISCONTINUED | OUTPATIENT
Start: 2020-06-22 | End: 2020-06-24 | Stop reason: HOSPADM

## 2020-06-22 RX ADMIN — SODIUM CHLORIDE, SODIUM LACTATE, POTASSIUM CHLORIDE, AND CALCIUM CHLORIDE 125 ML/HR: .6; .31; .03; .02 INJECTION, SOLUTION INTRAVENOUS at 06:48

## 2020-06-22 RX ADMIN — WITCH HAZEL 1 PAD: 500 SOLUTION RECTAL; TOPICAL at 21:52

## 2020-06-22 RX ADMIN — LIDOCAINE HYDROCHLORIDE AND EPINEPHRINE 3 ML: 15; 5 INJECTION, SOLUTION EPIDURAL at 13:33

## 2020-06-22 RX ADMIN — Medication 2 MILLI-UNITS/MIN: at 08:40

## 2020-06-22 RX ADMIN — LIDOCAINE HYDROCHLORIDE AND EPINEPHRINE 2 ML: 15; 5 INJECTION, SOLUTION EPIDURAL at 13:36

## 2020-06-22 RX ADMIN — BENZOCAINE AND LEVOMENTHOL: 200; 5 SPRAY TOPICAL at 21:52

## 2020-06-22 RX ADMIN — SODIUM CHLORIDE, SODIUM LACTATE, POTASSIUM CHLORIDE, AND CALCIUM CHLORIDE 999 ML/HR: .6; .31; .03; .02 INJECTION, SOLUTION INTRAVENOUS at 13:01

## 2020-06-22 RX ADMIN — HYDROCORTISONE 1 APPLICATION: 1 CREAM TOPICAL at 21:52

## 2020-06-22 RX ADMIN — SODIUM CHLORIDE, SODIUM LACTATE, POTASSIUM CHLORIDE, AND CALCIUM CHLORIDE 125 ML/HR: .6; .31; .03; .02 INJECTION, SOLUTION INTRAVENOUS at 17:29

## 2020-06-22 RX ADMIN — DOCUSATE SODIUM 100 MG: 100 CAPSULE, LIQUID FILLED ORAL at 21:51

## 2020-06-22 RX ADMIN — ROPIVACAINE HYDROCHLORIDE 5 ML: 2 INJECTION, SOLUTION EPIDURAL; INFILTRATION at 13:39

## 2020-06-22 RX ADMIN — ROPIVACAINE HYDROCHLORIDE: 2 INJECTION, SOLUTION EPIDURAL; INFILTRATION at 13:40

## 2020-06-23 PROCEDURE — 99024 POSTOP FOLLOW-UP VISIT: CPT | Performed by: OBSTETRICS & GYNECOLOGY

## 2020-06-23 RX ADMIN — ACETAMINOPHEN 650 MG: 325 TABLET, FILM COATED ORAL at 17:37

## 2020-06-23 RX ADMIN — ACETAMINOPHEN 650 MG: 325 TABLET, FILM COATED ORAL at 11:37

## 2020-06-23 RX ADMIN — Medication 1 TABLET: at 08:12

## 2020-06-23 RX ADMIN — ACETAMINOPHEN 650 MG: 325 TABLET, FILM COATED ORAL at 04:16

## 2020-06-24 VITALS
WEIGHT: 203 LBS | OXYGEN SATURATION: 97 % | SYSTOLIC BLOOD PRESSURE: 121 MMHG | HEART RATE: 91 BPM | DIASTOLIC BLOOD PRESSURE: 76 MMHG | TEMPERATURE: 98.1 F | HEIGHT: 60 IN | RESPIRATION RATE: 20 BRPM | BODY MASS INDEX: 39.85 KG/M2

## 2020-06-24 PROCEDURE — NC001 PR NO CHARGE: Performed by: OBSTETRICS & GYNECOLOGY

## 2020-06-24 PROCEDURE — 90707 MMR VACCINE SC: CPT | Performed by: STUDENT IN AN ORGANIZED HEALTH CARE EDUCATION/TRAINING PROGRAM

## 2020-06-24 RX ORDER — IBUPROFEN 600 MG/1
600 TABLET ORAL EVERY 6 HOURS PRN
Refills: 0
Start: 2020-06-24 | End: 2022-05-18

## 2020-06-24 RX ORDER — ACETAMINOPHEN 325 MG/1
650 TABLET ORAL EVERY 6 HOURS PRN
Qty: 30 TABLET | Refills: 0
Start: 2020-06-24 | End: 2022-06-22

## 2020-06-24 RX ADMIN — MEASLES, MUMPS, AND RUBELLA VIRUS VACCINE LIVE 0.5 ML: 1000; 12500; 1000 INJECTION, POWDER, LYOPHILIZED, FOR SUSPENSION SUBCUTANEOUS at 08:52

## 2020-06-24 RX ADMIN — ACETAMINOPHEN 650 MG: 325 TABLET, FILM COATED ORAL at 00:54

## 2020-06-24 RX ADMIN — Medication 1 TABLET: at 08:52

## 2020-06-29 ENCOUNTER — TELEPHONE (OUTPATIENT)
Dept: OBGYN CLINIC | Facility: CLINIC | Age: 35
End: 2020-06-29

## 2020-06-30 LAB — PLACENTA IN STORAGE: NORMAL

## 2020-07-13 ENCOUNTER — OFFICE VISIT (OUTPATIENT)
Dept: OBGYN CLINIC | Facility: CLINIC | Age: 35
End: 2020-07-13

## 2020-07-13 VITALS
HEART RATE: 82 BPM | WEIGHT: 178 LBS | DIASTOLIC BLOOD PRESSURE: 72 MMHG | BODY MASS INDEX: 34.76 KG/M2 | SYSTOLIC BLOOD PRESSURE: 105 MMHG

## 2020-07-13 PROBLEM — O09.893 HISTORY OF PRETERM DELIVERY, CURRENTLY PREGNANT, THIRD TRIMESTER: Status: RESOLVED | Noted: 2020-01-25 | Resolved: 2020-07-13

## 2020-07-13 PROBLEM — O09.523 AMA (ADVANCED MATERNAL AGE) MULTIGRAVIDA 35+, THIRD TRIMESTER: Status: RESOLVED | Noted: 2020-02-06 | Resolved: 2020-07-13

## 2020-07-13 PROBLEM — O99.213 MATERNAL OBESITY SYNDROME IN THIRD TRIMESTER: Status: RESOLVED | Noted: 2020-01-25 | Resolved: 2020-07-13

## 2020-07-13 PROBLEM — Z3A.36 36 WEEKS GESTATION OF PREGNANCY: Status: RESOLVED | Noted: 2020-05-26 | Resolved: 2020-07-13

## 2020-07-13 PROBLEM — Z3A.37 37 WEEKS GESTATION OF PREGNANCY: Status: RESOLVED | Noted: 2020-02-14 | Resolved: 2020-07-13

## 2020-07-13 PROBLEM — O43.199 MARGINAL INSERTION OF UMBILICAL CORD AFFECTING MANAGEMENT OF MOTHER: Status: RESOLVED | Noted: 2020-02-14 | Resolved: 2020-07-13

## 2020-07-13 PROCEDURE — 99213 OFFICE O/P EST LOW 20 MIN: CPT | Performed by: NURSE PRACTITIONER

## 2020-07-13 NOTE — PROGRESS NOTES
Lamonte Sotomayor is a 27 y o  y o  female [id number] who presents for a postpartum visit  She is 3 weeks postpartum following a spontaneous vaginal delivery on  2020,  [facility]  She had a male, named   "Tristen Yanez" that weighted  6 lb 9 6 oz, Apgars were 9 and 9  Her pregnancy was complicated by history of  delivery, AMA, obesity, rubella nonimmune  She plans on  postpartum tubal ligation  She is taking a supplement for milk supply with milk thistle,  Because she missed pumping for a little bit because of migraine headache  Currently with no headaches,  Denies any visual changes, chest pain,  Extremity pain or shortness of breath She had no lacerations delivery  Her blood pressure today is 105/72  Her weight is 178 lbs    I have fully reviewed the prenatal and intrapartum course  The delivery was at  37 weeks and 2 days gestational weeks  Outcome: spontaneous vaginal delivery  Anesthesia: epidural  Postpartum course has been uneventful  Baby's course has been uneventful  Baby is feeding by breast- pt is pumping  Bleeding no bleeding  Bowel function is normal  Bladder function is normal  Patient is sexually active  Contraception method is abstinence  Postpartum depression screening: negative  She scored a 3  She is a single  Mother,  She is doing well she does not need any help with   She has her sister to help her if she needs  The following portions of the patient's history were reviewed and updated as appropriate: allergies, current medications, past family history, past medical history, past social history, past surgical history and problem list     Review of Systems  Pertinent items are noted in HPI       Objective       General:   appears stated age, cooperative, alert normal mood and affect   Neck: Neck: normal, supple,trachea midline, no masses   Heart: regular rate and rhythm, S1, S2 normal, no murmur, click, rub or gallop   Lungs: clear to auscultation bilaterally Breasts: Not examined- denies any symptoms   Abdomen: soft, non-tender, without masses or organomegaly   Vulva: Not examined   Vagina:    Urethra:    Cervix:    Uterus:    Adnexa:      Assessment/Plan     3wks postpartum exam   with no lacerations  Pap smear not done at today's visit  Her last Pap was 2017- her Pap and HPV were all negative  1  Contraception: tubal ligation- plans on at  6 weeks PP- will message Margaret Fothergill to schedule,  MA 31 was signed 2020  Patient plans on  abstinence until she gets her tubal    2   Continue prenatal vitamins  3  Follow up for  H&P with physicians or as needed    4  She received rubella postpartum

## 2020-08-06 ENCOUNTER — OFFICE VISIT (OUTPATIENT)
Dept: OBGYN CLINIC | Facility: CLINIC | Age: 35
End: 2020-08-06

## 2020-08-06 DIAGNOSIS — Z30.2 REQUEST FOR STERILIZATION: Primary | ICD-10-CM

## 2020-08-06 PROCEDURE — 99213 OFFICE O/P EST LOW 20 MIN: CPT | Performed by: OBSTETRICS & GYNECOLOGY

## 2020-08-13 ENCOUNTER — TELEPHONE (OUTPATIENT)
Dept: OBGYN CLINIC | Facility: CLINIC | Age: 35
End: 2020-08-13

## 2020-08-14 NOTE — TELEPHONE ENCOUNTER
Reviewed case with surgical committee and approved  Patient is okay for scheduling and H&P if needed      Dinah Tran  8/13/2020  5:00 PM

## 2020-08-17 ENCOUNTER — TELEPHONE (OUTPATIENT)
Dept: OBGYN CLINIC | Facility: CLINIC | Age: 35
End: 2020-08-17

## 2020-08-22 NOTE — TELEPHONE ENCOUNTER
Patient returning office phone call  Informed patient of negative STD result, patient expresses understanding  13.2

## 2020-08-24 ENCOUNTER — TELEPHONE (OUTPATIENT)
Dept: OBGYN CLINIC | Facility: CLINIC | Age: 35
End: 2020-08-24

## 2020-09-02 ENCOUNTER — TELEPHONE (OUTPATIENT)
Dept: OBGYN CLINIC | Facility: CLINIC | Age: 35
End: 2020-09-02

## 2020-10-12 ENCOUNTER — OFFICE VISIT (OUTPATIENT)
Dept: OBGYN CLINIC | Facility: CLINIC | Age: 35
End: 2020-10-12

## 2020-10-12 VITALS
DIASTOLIC BLOOD PRESSURE: 76 MMHG | RESPIRATION RATE: 16 BRPM | HEART RATE: 90 BPM | WEIGHT: 189 LBS | BODY MASS INDEX: 36.91 KG/M2 | SYSTOLIC BLOOD PRESSURE: 122 MMHG | TEMPERATURE: 98 F

## 2020-10-12 DIAGNOSIS — Z30.09 ENCOUNTER FOR COUNSELING REGARDING CONTRACEPTION: Primary | ICD-10-CM

## 2020-10-12 PROCEDURE — 99213 OFFICE O/P EST LOW 20 MIN: CPT | Performed by: NURSE PRACTITIONER

## 2020-10-12 RX ORDER — MEDROXYPROGESTERONE ACETATE 150 MG/ML
150 INJECTION, SUSPENSION INTRAMUSCULAR
Qty: 1 ML | Refills: 1 | Status: SHIPPED | OUTPATIENT
Start: 2020-10-12 | End: 2020-12-31 | Stop reason: SDUPTHER

## 2020-10-15 ENCOUNTER — CLINICAL SUPPORT (OUTPATIENT)
Dept: OBGYN CLINIC | Facility: CLINIC | Age: 35
End: 2020-10-15

## 2020-10-15 DIAGNOSIS — Z30.42 ENCOUNTER FOR DEPO-PROVERA CONTRACEPTION: Primary | ICD-10-CM

## 2020-10-15 PROCEDURE — 96372 THER/PROPH/DIAG INJ SC/IM: CPT

## 2020-10-15 RX ORDER — MEDROXYPROGESTERONE ACETATE 150 MG/ML
150 INJECTION, SUSPENSION INTRAMUSCULAR ONCE
Status: COMPLETED | OUTPATIENT
Start: 2020-10-15 | End: 2020-10-15

## 2020-10-15 RX ADMIN — MEDROXYPROGESTERONE ACETATE 150 MG: 150 INJECTION, SUSPENSION INTRAMUSCULAR at 09:08

## 2020-10-27 ENCOUNTER — OFFICE VISIT (OUTPATIENT)
Dept: FAMILY MEDICINE CLINIC | Facility: CLINIC | Age: 35
End: 2020-10-27
Payer: COMMERCIAL

## 2020-10-27 VITALS
DIASTOLIC BLOOD PRESSURE: 78 MMHG | HEART RATE: 96 BPM | RESPIRATION RATE: 16 BRPM | WEIGHT: 189 LBS | OXYGEN SATURATION: 99 % | SYSTOLIC BLOOD PRESSURE: 122 MMHG | BODY MASS INDEX: 37.11 KG/M2 | TEMPERATURE: 96.4 F | HEIGHT: 60 IN

## 2020-10-27 DIAGNOSIS — G43.811 OTHER MIGRAINE WITH STATUS MIGRAINOSUS, INTRACTABLE: ICD-10-CM

## 2020-10-27 DIAGNOSIS — Z00.00 HEALTH MAINTENANCE EXAMINATION: Primary | ICD-10-CM

## 2020-10-27 DIAGNOSIS — Z11.4 SCREENING FOR HIV (HUMAN IMMUNODEFICIENCY VIRUS): ICD-10-CM

## 2020-10-27 DIAGNOSIS — Z13.1 SCREENING FOR DIABETES MELLITUS: ICD-10-CM

## 2020-10-27 DIAGNOSIS — Z13.220 SCREENING FOR LIPOID DISORDERS: ICD-10-CM

## 2020-10-27 PROCEDURE — 3008F BODY MASS INDEX DOCD: CPT | Performed by: FAMILY MEDICINE

## 2020-10-27 PROCEDURE — 3725F SCREEN DEPRESSION PERFORMED: CPT | Performed by: FAMILY MEDICINE

## 2020-10-27 PROCEDURE — 3008F BODY MASS INDEX DOCD: CPT | Performed by: NURSE PRACTITIONER

## 2020-10-27 PROCEDURE — 99385 PREV VISIT NEW AGE 18-39: CPT | Performed by: FAMILY MEDICINE

## 2020-10-27 RX ORDER — PROPRANOLOL HYDROCHLORIDE 40 MG/1
40 TABLET ORAL 2 TIMES DAILY
Qty: 60 TABLET | Refills: 0 | Status: SHIPPED | OUTPATIENT
Start: 2020-10-27 | End: 2022-06-22

## 2020-11-06 ENCOUNTER — TELEPHONE (OUTPATIENT)
Dept: NEUROLOGY | Facility: CLINIC | Age: 35
End: 2020-11-06

## 2020-12-31 ENCOUNTER — CLINICAL SUPPORT (OUTPATIENT)
Dept: OBGYN CLINIC | Facility: CLINIC | Age: 35
End: 2020-12-31

## 2020-12-31 DIAGNOSIS — Z30.42 ENCOUNTER FOR DEPO-PROVERA CONTRACEPTION: Primary | ICD-10-CM

## 2020-12-31 DIAGNOSIS — Z30.09 ENCOUNTER FOR COUNSELING REGARDING CONTRACEPTION: ICD-10-CM

## 2020-12-31 LAB — SL AMB POCT URINE HCG: NEGATIVE

## 2020-12-31 PROCEDURE — 81025 URINE PREGNANCY TEST: CPT

## 2020-12-31 PROCEDURE — 96372 THER/PROPH/DIAG INJ SC/IM: CPT

## 2020-12-31 RX ORDER — MEDROXYPROGESTERONE ACETATE 150 MG/ML
150 INJECTION, SUSPENSION INTRAMUSCULAR
Qty: 1 ML | Refills: 0 | Status: SHIPPED | OUTPATIENT
Start: 2020-12-31 | End: 2022-05-18

## 2020-12-31 RX ORDER — MEDROXYPROGESTERONE ACETATE 150 MG/ML
150 INJECTION, SUSPENSION INTRAMUSCULAR ONCE
Status: COMPLETED | OUTPATIENT
Start: 2020-12-31 | End: 2020-12-31

## 2020-12-31 RX ADMIN — MEDROXYPROGESTERONE ACETATE 150 MG: 150 INJECTION, SUSPENSION INTRAMUSCULAR at 10:35

## 2021-03-23 NOTE — PROGRESS NOTES
ASSESSMENT & PLAN: Yordan Casanova is a 38 y o  [id number] with normal gynecologic exam     1   Routine well woman exam done today  2  Pap and HPV:  The patient's last pap and hpv was due 2017  It was normal     Pap and cotesting was not done today  Current ASCCP Guidelines reviewed  Next due 2022  3  The following were reviewed in today's visit: breast self exam, family planning choices, adequate intake of calcium and vitamin D, exercise and healthy diet  4   Depo-Provera for contraception her last dose was 2020  5 Gardisil vaccine in women up to age 39 discussed and information was provided  BMI Counseling: Body mass index is 38 92 kg/m²  The BMI is above normal  Nutrition recommendations include 3-5 servings of fruits/vegetables daily, moderation in carbohydrate intake and increasing intake of lean protein  Exercise recommendations include exercising 3-5 times per week  CC:  Annual Gynecologic Examination    HPI: Yordan Casanova is a 38 y o  [id number] who presents for annual gynecologic examination  her last Pap was 2017 was negative negative high-risk HPV, next due 2022  She is on Depo-Provera for contraception  , she is due for dose today  But she does not want to continue  Her LMP was 2020  she reports she has been having bleeding every day until 1 week ago  She states she wants to just have regular  bleeding  wants to stop Depo at this time,  currently she is not sexually active  That she will use condoms if sexually active and will return to office for restart of Depo  PMHx- migraines and cholecystectomy  She will see a neurologist for her migraines     she had an  2020 had a baby boy named "Lizzy Oseguera", her pregnancy was complicated with history of  delivery, AMA, obesity rubella immune        Health Maintenance:    She wears her seatbelt routinely  She does perform irregular monthly self breast exams  She feels safe at home       Past Medical History:   Diagnosis Date    17 weeks gestation of pregnancy 2020    Gallbladder attack     Kidney stones     Migraines     Varicella        Past Surgical History:   Procedure Laterality Date    LAPAROSCOPIC CHOLECYSTECTOMY      LITHOTRIPSY      TONSILLECTOMY         Past OB/Gyn History:  OB History        5    Para   4    Term   3       1    AB   1    Living   4       SAB   1    TAB   0    Ectopic   0    Multiple   0    Live Births   4               Pt has menstrual issues  Irregular with Depo   History of sexually transmitted infection: No   History of abnormal pap smears: No      Patient is not currently sexually active  History of sexually active with heterosexual   The current method of family planning is abstinence      Family History   Problem Relation Age of Onset    No Known Problems Mother     No Known Problems Father     No Known Problems Sister     No Known Problems Brother     No Known Problems Daughter     No Known Problems Son     No Known Problems Maternal Grandfather     No Known Problems Sister     No Known Problems Sister     No Known Problems Daughter        Social History:  Social History     Socioeconomic History    Marital status: Single     Spouse name: Not on file    Number of children: Not on file    Years of education: Not on file    Highest education level: Not on file   Occupational History    Not on file   Social Needs    Financial resource strain: Not on file    Food insecurity     Worry: Not on file     Inability: Not on file    Transportation needs     Medical: Not on file     Non-medical: Not on file   Tobacco Use    Smoking status: Never Smoker    Smokeless tobacco: Never Used   Substance and Sexual Activity    Alcohol use: No    Drug use: No    Sexual activity: Yes     Partners: Male     Birth control/protection: None, Injection     Comment: got pregnant on Depo   Lifestyle    Physical activity     Days per week: Not on file     Minutes per session: Not on file    Stress: Not on file   Relationships    Social connections     Talks on phone: Not on file     Gets together: Not on file     Attends Gnosticist service: Not on file     Active member of club or organization: Not on file     Attends meetings of clubs or organizations: Not on file     Relationship status: Not on file    Intimate partner violence     Fear of current or ex partner: Not on file     Emotionally abused: Not on file     Physically abused: Not on file     Forced sexual activity: Not on file   Other Topics Concern    Not on file   Social History Narrative    Not on file     Presently lives with  children  Patient is single  Patient is currently employed  Works at [employer] Anaphylaxis, Hives and Anxiety     Category: Allergy; Annotation - [id number]: HIVES         Current Outpatient Medications:     acetaminophen (TYLENOL) 325 mg tablet, Take 2 tablets (650 mg total) by mouth every 6 (six) hours as needed for moderate pain (Patient not taking: Reported on 7/13/2020), Disp: 30 tablet, Rfl: 0    ibuprofen (MOTRIN) 600 mg tablet, Take 1 tablet (600 mg total) by mouth every 6 (six) hours as needed for moderate pain (Patient not taking: Reported on 7/13/2020), Disp: , Rfl: 0    medroxyPROGESTERone (DEPO-PROVERA) 150 mg/mL injection, Inject 1 mL (150 mg total) into a muscle every 3 (three) months, Disp: 1 mL, Rfl: 0    propranolol (INDERAL) 40 mg tablet, Take 1 tablet (40 mg total) by mouth 2 (two) times a day, Disp: 60 tablet, Rfl: 0    SUMAtriptan (IMITREX) 25 mg tablet, Take 25 mg by mouth once as needed for migraine, Disp: , Rfl:       Review of Systems  Constitutional :no fever, feels well, no tiredness, no recent weight gain or loss  ENT: no ear ache, no loss of hearing, no nosebleeds or nasal discharge, no sore throat or hoarseness    Cardiovascular: no complaints of slow or fast heart beat, no chest pain, no palpitations, no leg claudication or lower extremity edema  Respiratory: no complaints of shortness of shortness of breath, no JACOBS  Breasts:no complaints of breast pain, breast lump, or nipple discharge  Gastrointestinal: no complaints of abdominal pain, constipation, nausea, vomiting, or diarrhea or bloody stools  Genitourinary : no complaints of dysuria, incontinence, pelvic pain, no dysmenorrhea, vaginal discharge or abnormal vaginal bleeding and as noted in HPI  Musculoskeletal: no complaints of arthralgia, no myalgia, no joint swelling or stiffness, no limb pain or swelling  Integumentary: no complaints of skin rash or lesion, itching or dry skin  Neurological: no complaints of headache, no confusion, no numbness or tingling, no dizziness or fainting    Objective      There were no vitals taken for this visit  General:   appears stated age, cooperative, alert normal mood and affect   Neck: normal, supple,trachea midline, no masses   Heart: regular rate and rhythm, S1, S2 normal, no murmur, click, rub or gallop   Lungs: clear to auscultation bilaterally   Breasts: normal appearance, no masses or tenderness, Inspection negative, No nipple retraction or dimpling, No nipple discharge or bleeding, No axillary or supraclavicular adenopathy, Normal to palpation without dominant masses, Taught monthly breast self examination   Abdomen: soft, non-tender, without masses or organomegaly   Vulva: normal female genitalia, Bartholin's, Urethra, [place] normal   Vagina: normal vagina, no discharge, exudate, lesion, or erythema   Urethra: normal   Cervix: Normal, no discharge  Nontender  Uterus: normal size, contour, position, consistency, mobility, non-tender and anteverted   Adnexa: normal adnexa and no mass, fullness, tenderness   Lymphatic palpation of lymph nodes in neck, axilla, groin and/or other locations: no lymphadenopathy or masses noted   Skin normal skin turgor and no rashes     Psychiatric orientation to person, place, and time: normal  mood and affect: normal

## 2021-03-24 ENCOUNTER — ANNUAL EXAM (OUTPATIENT)
Dept: OBGYN CLINIC | Facility: CLINIC | Age: 36
End: 2021-03-24

## 2021-03-24 VITALS
HEART RATE: 94 BPM | DIASTOLIC BLOOD PRESSURE: 80 MMHG | BODY MASS INDEX: 38.89 KG/M2 | SYSTOLIC BLOOD PRESSURE: 115 MMHG | WEIGHT: 206 LBS | HEIGHT: 61 IN

## 2021-03-24 DIAGNOSIS — Z01.419 ENCOUNTER FOR GYNECOLOGICAL EXAMINATION WITHOUT ABNORMAL FINDING: Primary | ICD-10-CM

## 2021-03-24 PROBLEM — Z30.09 ENCOUNTER FOR COUNSELING REGARDING CONTRACEPTION: Status: RESOLVED | Noted: 2020-10-12 | Resolved: 2021-03-24

## 2021-03-24 PROCEDURE — 99395 PREV VISIT EST AGE 18-39: CPT | Performed by: NURSE PRACTITIONER

## 2021-03-24 NOTE — PATIENT INSTRUCTIONS
Covid - 19 instructions    If you are having any of the following     Cough   Shortness of breath   Fever  If traveled internationally or to high risk [place] states  Or been in contact with someone that has     Please call:    [phone]  option 7    They will screen you and direct you to the nearest testing location     Should not come to the PCP or OB office without calling that number first        HPV (Human Papillomavirus) Vaccine for Adults   WHAT YOU NEED TO KNOW:   What is the human papillomavirus (HPV) vaccine? The HPV vaccine is an injection given to females and males to protect against human papillomavirus infection  HPV is most commonly spread through sexual activity  It can also be spread from a mother to her baby during delivery  The HPV vaccine is most effective if given before sexual activity begins  This allows your body to build almost complete protection against HPV before you have contact with the virus  The HPV vaccine is still effective after sexual activity has begun  How is the vaccine given? The HPV vaccine can be given with other vaccines  The vaccine is given in 2 or 3 doses through age 32:  · The first dose  is given at any time  · The second dose  is given 1 to 2 months after the first dose  · The third dose  is given 6 months after the first dose  What else do I need to know about how the vaccine is given? You may need 1 more dose of the HPV vaccine if any of the following is true:  · You only received 1 dose of the HPV vaccine before 13years of age  · You received 2 doses of the HPV vaccine less than 5 months apart before 13years of age  What are reasons I should not get the HPV vaccine, or should wait to get it? · You had a severe allergic reaction to a dose of the vaccine  · You are pregnant  Your healthcare provider will tell you when you can get the vaccine  · You are sick or have a fever   You may need to wait to get the vaccine until symptoms go away     What are the risks of the HPV vaccine? You may have pain, redness, or swelling where the shot was given  You may have a fever or headache  You may have an allergic reaction to the vaccine  This can be life-threatening  Call your local emergency number (911 in the [address]) if:   · You have signs of a severe allergic reaction, such as trouble breathing, hives, or wheezing  When should I seek immediate care? · You have a high fever or behavior changes that concern you  When should I call my doctor? · You have questions or concerns about the HPV vaccine  CARE AGREEMENT:   You have the right to help plan your care  Learn about your health condition and how it may be treated  Discuss treatment options with your healthcare providers to decide what care you want to receive  You always have the right to refuse treatment  The above information is an  only  It is not intended as medical advice for individual conditions or treatments  Talk to your doctor, nurse or pharmacist before following any medical regimen to see if it is safe and effective for you  © Copyright [address] Information is for End User's use only and may not be sold, redistributed or otherwise used for commercial purposes   All illustrations and images included in CareNotes® are the copyrighted property of [employer]  or [address] [place] [address] [facility]

## 2021-04-13 DIAGNOSIS — Z23 ENCOUNTER FOR IMMUNIZATION: ICD-10-CM

## 2021-12-09 ENCOUNTER — OFFICE VISIT (OUTPATIENT)
Dept: OBGYN CLINIC | Facility: CLINIC | Age: 36
End: 2021-12-09

## 2021-12-09 DIAGNOSIS — Z30.2 REQUEST FOR STERILIZATION: Primary | ICD-10-CM

## 2021-12-09 PROCEDURE — 99213 OFFICE O/P EST LOW 20 MIN: CPT | Performed by: OBSTETRICS & GYNECOLOGY

## 2022-01-14 ENCOUNTER — DOCUMENTATION (OUTPATIENT)
Dept: OTHER | Facility: HOSPITAL | Age: 37
End: 2022-01-14

## 2022-01-14 NOTE — QUICK NOTE
Surgical Committee Review:   Patient consented for laparoscopic bilateral salpingectomy for permanent sterilization with Dr Dolly Yoon on 12/9/21  Patient signed MA-31 on 12/9/21  Surgery approved and patient will need to RTO for H&P prior to surgery date  Discussed with Dr Juanita Schultz MD  OB/GYN  1/14/2022  2:28 PM

## 2022-01-15 ENCOUNTER — APPOINTMENT (EMERGENCY)
Dept: ULTRASOUND IMAGING | Facility: HOSPITAL | Age: 37
End: 2022-01-15
Payer: MEDICARE

## 2022-01-15 ENCOUNTER — HOSPITAL ENCOUNTER (EMERGENCY)
Facility: HOSPITAL | Age: 37
Discharge: HOME | End: 2022-01-15
Attending: EMERGENCY MEDICINE | Admitting: EMERGENCY MEDICINE
Payer: MEDICARE

## 2022-01-15 VITALS
DIASTOLIC BLOOD PRESSURE: 58 MMHG | TEMPERATURE: 98.6 F | RESPIRATION RATE: 18 BRPM | HEART RATE: 72 BPM | SYSTOLIC BLOOD PRESSURE: 115 MMHG | OXYGEN SATURATION: 100 %

## 2022-01-15 DIAGNOSIS — N93.9 VAGINAL BLEEDING: Primary | ICD-10-CM

## 2022-01-15 DIAGNOSIS — Z34.90 PREGNANCY: ICD-10-CM

## 2022-01-15 LAB
ABO GROUP BLD: NORMAL
ALBUMIN SERPL BCP-MCNC: 4.2 G/DL (ref 3.4–4.8)
ALP SERPL-CCNC: 65 U/L (ref 35–140)
ALT SERPL W P-5'-P-CCNC: 16 U/L (ref 5–54)
ANION GAP SERPL CALCULATED.3IONS-SCNC: 8 MMOL/L (ref 4–13)
APTT PPP: 32 SECONDS (ref 23–37)
AST SERPL W P-5'-P-CCNC: 14 U/L (ref 15–41)
B-HCG SERPL-ACNC: 9.32 MIU/ML
BASOPHILS # BLD AUTO: 0.04 THOUSANDS/ΜL (ref 0–0.1)
BASOPHILS NFR BLD AUTO: 1 % (ref 0–1)
BILIRUB SERPL-MCNC: 0.94 MG/DL (ref 0.3–1.2)
BUN SERPL-MCNC: 10 MG/DL (ref 6–20)
CALCIUM SERPL-MCNC: 8.8 MG/DL (ref 8.4–10.2)
CHLORIDE SERPL-SCNC: 105 MMOL/L (ref 96–108)
CO2 SERPL-SCNC: 25 MMOL/L (ref 22–33)
CREAT SERPL-MCNC: 0.59 MG/DL (ref 0.4–1.1)
EOSINOPHIL # BLD AUTO: 0.05 THOUSAND/ΜL (ref 0–0.61)
EOSINOPHIL NFR BLD AUTO: 1 % (ref 0–6)
ERYTHROCYTE [DISTWIDTH] IN BLOOD BY AUTOMATED COUNT: 13.2 % (ref 11.6–15.1)
GFR SERPL CREATININE-BSD FRML MDRD: 117 ML/MIN/1.73SQ M
GLUCOSE SERPL-MCNC: 107 MG/DL (ref 65–140)
HCT VFR BLD AUTO: 35.5 % (ref 34.8–46.1)
HGB BLD-MCNC: 11.7 G/DL (ref 11.5–15.4)
IMM GRANULOCYTES # BLD AUTO: 0.03 THOUSAND/UL (ref 0–0.2)
IMM GRANULOCYTES NFR BLD AUTO: 0 % (ref 0–2)
INR PPP: 0.97 (ref 0.84–1.19)
LYMPHOCYTES # BLD AUTO: 1.89 THOUSANDS/ΜL (ref 0.6–4.47)
LYMPHOCYTES NFR BLD AUTO: 27 % (ref 14–44)
MCH RBC QN AUTO: 29.7 PG (ref 26.8–34.3)
MCHC RBC AUTO-ENTMCNC: 33 G/DL (ref 31.4–37.4)
MCV RBC AUTO: 90 FL (ref 82–98)
MONOCYTES # BLD AUTO: 0.46 THOUSAND/ΜL (ref 0.17–1.22)
MONOCYTES NFR BLD AUTO: 7 % (ref 4–12)
NEUTROPHILS # BLD AUTO: 4.63 THOUSANDS/ΜL (ref 1.85–7.62)
NEUTS SEG NFR BLD AUTO: 64 % (ref 43–75)
NRBC BLD AUTO-RTO: 0 /100 WBCS
PLATELET # BLD AUTO: 293 THOUSANDS/UL (ref 149–390)
PMV BLD AUTO: 9.9 FL (ref 8.9–12.7)
POTASSIUM SERPL-SCNC: 3.8 MMOL/L (ref 3.5–5)
PROT SERPL-MCNC: 7.2 G/DL (ref 6.4–8.3)
PROTHROMBIN TIME: 12.8 SECONDS (ref 11.6–14.5)
RBC # BLD AUTO: 3.94 MILLION/UL (ref 3.81–5.12)
RH BLD: POSITIVE
SODIUM SERPL-SCNC: 138 MMOL/L (ref 133–145)
WBC # BLD AUTO: 7.1 THOUSAND/UL (ref 4.31–10.16)

## 2022-01-15 PROCEDURE — 85025 COMPLETE CBC W/AUTO DIFF WBC: CPT | Performed by: EMERGENCY MEDICINE

## 2022-01-15 PROCEDURE — 80053 COMPREHEN METABOLIC PANEL: CPT | Performed by: EMERGENCY MEDICINE

## 2022-01-15 PROCEDURE — 85610 PROTHROMBIN TIME: CPT | Performed by: EMERGENCY MEDICINE

## 2022-01-15 PROCEDURE — 36415 COLL VENOUS BLD VENIPUNCTURE: CPT | Performed by: EMERGENCY MEDICINE

## 2022-01-15 PROCEDURE — 86900 BLOOD TYPING SEROLOGIC ABO: CPT | Performed by: EMERGENCY MEDICINE

## 2022-01-15 PROCEDURE — 99284 EMERGENCY DEPT VISIT MOD MDM: CPT

## 2022-01-15 PROCEDURE — 84702 CHORIONIC GONADOTROPIN TEST: CPT | Performed by: EMERGENCY MEDICINE

## 2022-01-15 PROCEDURE — 99282 EMERGENCY DEPT VISIT SF MDM: CPT | Performed by: EMERGENCY MEDICINE

## 2022-01-15 PROCEDURE — 86901 BLOOD TYPING SEROLOGIC RH(D): CPT | Performed by: EMERGENCY MEDICINE

## 2022-01-15 PROCEDURE — 85730 THROMBOPLASTIN TIME PARTIAL: CPT | Performed by: EMERGENCY MEDICINE

## 2022-01-15 PROCEDURE — 96361 HYDRATE IV INFUSION ADD-ON: CPT

## 2022-01-15 PROCEDURE — 96360 HYDRATION IV INFUSION INIT: CPT

## 2022-01-15 PROCEDURE — 76801 OB US < 14 WKS SINGLE FETUS: CPT

## 2022-01-15 RX ADMIN — SODIUM CHLORIDE 1000 ML: 0.9 INJECTION, SOLUTION INTRAVENOUS at 06:57

## 2022-01-15 NOTE — DISCHARGE INSTRUCTIONS
Follow-up with your OBGYN as soon as possible for recheck of your hCG  Ultrasound today did not show an intra-uterine pregnancy  It's possible the pregnancy was lost, it is extremely early in the pregnancy or the pregnancy in not located in the correct spot  Please follow up with OBGYN as soon as possible regarding this  Come back to emergency department if you have increasing abdominal pain a large amount of blood loss, worsening symptoms such as dizziness, shortness of breath, loss of color in your face

## 2022-01-15 NOTE — ED CARE HANDOFF
Emergency Department Sign Out Note        Sign out and transfer of care from Dr Mary Fam  See Separate Emergency Department note  The patient, Martha Butt, was evaluated by the previous provider for vaginal bleeding  Workup Completed:  bloodwork    ED Course / Workup Pending (followup): Urinalysis and ultrasound  HCG is very low  No anemia  No intrauterine pregnancy identified on ultrasound  Recommended follow-up in 48-72 hours for recheck of hCG  Patient told can not exclude loss of pregnancy, early pregnancy ectopic  Patient unable to provide urine sample during stay in the emergency department  Likely not pregnant  Will not keep the emergency department longer for urine sample  No longer having abdominal pain  Will discharge home   Return precautions given  ED Course as of 01/15/22 1015   Sat Sean 15, 2022   0713 Bleeding, clots and cramping  5 weeks pregnant  LLQ pain   0845 Rh Factor: Positive  No need for rhogam   0845 ABO Grouping: A     Procedures  MDM        Disposition  Final diagnoses:   Vaginal bleeding   Pregnancy     Time reflects when diagnosis was documented in both MDM as applicable and the Disposition within this note     Time User Action Codes Description Comment    1/15/2022  9:59 AM [place] Add [N93 9] Vaginal bleeding     1/15/2022  9:59 AM [place] Add [Z34 90] Pregnancy       ED Disposition     ED Disposition Condition Date/Time Comment    Discharge Stable Sat Sean 15, 2022  9:59 AM Martha Butt discharge to home/self care              Follow-up Information     Follow up With Specialties Details Why Contact Info Additional [id number]  Emergency Department Emergency Medicine  If symptoms worsen [address] [facility] [id number]  [address] [place] [address] Emergency Department, [address] [place] [address]        Discharge Medication List as of 1/15/2022 10:01 AM      CONTINUE these medications which have NOT CHANGED    Details   SUMAtriptan (IMITREX) 25 mg tablet Take 25 mg by mouth once as needed for migraine, Historical Med      acetaminophen (TYLENOL) 325 mg tablet Take 2 tablets (650 mg total) by mouth every 6 (six) hours as needed for moderate pain, Starting Wed 6/24/2020, No Print      ibuprofen (MOTRIN) 600 mg tablet Take 1 tablet (600 mg total) by mouth every 6 (six) hours as needed for moderate pain, Starting Wed 6/24/2020, No Print      medroxyPROGESTERone (DEPO-PROVERA) 150 mg/mL injection Inject 1 mL (150 mg total) into a muscle every 3 (three) months, Starting Thu 12/31/2020, Normal      propranolol (INDERAL) 40 mg tablet Take 1 tablet (40 mg total) by mouth 2 (two) times a day, Starting Tue 10/27/2020, Normal           No discharge procedures on file         ED Provider  Electronically Signed by     Tejinder Madrigal DO  01/15/22 1015

## 2022-01-15 NOTE — ED PROVIDER NOTES
History  Chief Complaint   Patient presents with    Vaginal Bleeding     Vaginal blleding x 1 hr  Using same pad  Approx  5 weeks pregnant     This is a 39year old female that ambulated to the ED with LLQ pain, vaginal bleed that started 1 hour PTA and cramping - reports that she is 5 weeks pregnant - signed consents for a tubal ligation in December and was waiting for a date for surgery when she got pregnant    Has had 4 live births - last birth was 21 months ago    Has hx of multiple miscarriages    Reports that bleeding is heavy with clots          Prior to Admission Medications   Prescriptions Last Dose Informant Patient Reported? Taking?    SUMAtriptan (IMITREX) 25 mg tablet  Self Yes Yes   Sig: Take 25 mg by mouth once as needed for migraine   acetaminophen (TYLENOL) 325 mg tablet Not Taking at Unknown time Self No No   Sig: Take 2 tablets (650 mg total) by mouth every 6 (six) hours as needed for moderate pain   Patient not taking: Reported on 7/13/2020   ibuprofen (MOTRIN) 600 mg tablet Not Taking at Unknown time Self No No   Sig: Take 1 tablet (600 mg total) by mouth every 6 (six) hours as needed for moderate pain   Patient not taking: Reported on 7/13/2020   medroxyPROGESTERone (DEPO-PROVERA) 150 mg/mL injection Not Taking at Unknown time  No No   Sig: Inject 1 mL (150 mg total) into a muscle every 3 (three) months   Patient not taking: Reported on 3/24/2021   propranolol (INDERAL) 40 mg tablet Not Taking at Unknown time  No No   Sig: Take 1 tablet (40 mg total) by mouth 2 (two) times a day   Patient not taking: Reported on 3/24/2021      Facility-Administered Medications: None       Past Medical History:   Diagnosis Date    17 weeks gestation of pregnancy 1/25/2020    Gallbladder attack     Kidney stones     Migraines     Varicella        Past Surgical History:   Procedure Laterality Date    LAPAROSCOPIC CHOLECYSTECTOMY      LITHOTRIPSY      TONSILLECTOMY         Family History   Problem Relation Age of Onset    No Known Problems Mother     No Known Problems Father     No Known Problems Sister     No Known Problems Brother     No Known Problems Daughter     No Known Problems Son     No Known Problems Maternal Grandfather     No Known Problems Sister     No Known Problems Sister     No Known Problems Daughter      I have reviewed and agree with the history as documented  E-Cigarette/Vaping    E-Cigarette Use Never User      E-Cigarette/Vaping Substances     Social History     Tobacco Use    Smoking status: Never Smoker    Smokeless tobacco: Never Used   Vaping Use    Vaping Use: Never used   Substance Use Topics    Alcohol use: No    Drug use: No       Review of Systems   Constitutional: Negative for activity change, appetite change, chills, diaphoresis, fatigue, fever and unexpected weight change  HENT: Negative for congestion, ear discharge, ear pain, mouth sores, sinus pressure, sinus pain, sneezing, sore throat, trouble swallowing and voice change  Eyes: Negative for photophobia, pain, discharge, redness, itching and visual disturbance  Respiratory: Negative for cough, chest tightness and shortness of breath  Cardiovascular: Negative for chest pain, palpitations and leg swelling  Gastrointestinal: Negative for abdominal pain, constipation, nausea and vomiting  Endocrine: Negative for cold intolerance, heat intolerance, polydipsia, polyphagia and polyuria  Genitourinary: Positive for vaginal bleeding  Negative for decreased urine volume, difficulty urinating, dysuria, frequency, hematuria and urgency  Pregnant   Musculoskeletal: Negative for arthralgias, back pain, gait problem, joint swelling, myalgias, neck pain and neck stiffness  Skin: Negative for color change and rash  Allergic/Immunologic: Negative for immunocompromised state     Neurological: Negative for dizziness, tremors, seizures, syncope, speech difficulty, weakness, light-headedness, numbness and headaches  Hematological: Does not bruise/bleed easily  Psychiatric/Behavioral: Negative for behavioral problems and suicidal ideas  Physical Exam  Physical Exam  Vitals and nursing note reviewed  Constitutional:       General: She is not in acute distress  Appearance: Normal appearance  She is well-developed and normal weight  She is not ill-appearing, toxic-appearing or diaphoretic  HENT:      Head: Normocephalic and atraumatic  Right Ear: Tympanic membrane, ear canal and external ear normal  There is no impacted cerumen  Left Ear: Tympanic membrane, ear canal and external ear normal  There is no impacted cerumen  Nose: Nose normal  No congestion or rhinorrhea  Mouth/Throat:      Mouth: Mucous membranes are moist       Pharynx: Oropharynx is clear  No oropharyngeal exudate or posterior oropharyngeal erythema  Eyes:      General: No scleral icterus  Right eye: No discharge  Left eye: No discharge  Extraocular Movements: Extraocular movements intact  Conjunctiva/sclera: Conjunctivae normal       Pupils: Pupils are equal, round, and reactive to light  Neck:      Thyroid: No thyromegaly  Vascular: No hepatojugular reflux or JVD  Trachea: No tracheal deviation  Cardiovascular:      Rate and Rhythm: Normal rate and regular rhythm  Pulses: Normal pulses  Carotid pulses are 2+ on the right side and 2+ on the left side  Radial pulses are 2+ on the right side and 2+ on the left side  Dorsalis pedis pulses are 2+ on the right side and 2+ on the left side  Posterior tibial pulses are 2+ on the right side and 2+ on the left side  Heart sounds: Normal heart sounds  No murmur heard  Pulmonary:      Effort: Pulmonary effort is normal  No accessory muscle usage or respiratory distress  Breath sounds: Normal breath sounds  No wheezing or rales     Chest:      Chest wall: No mass, deformity, tenderness, crepitus or edema  Abdominal:      General: Bowel sounds are normal  There is no distension or abdominal bruit  Palpations: Abdomen is soft  There is no hepatomegaly, splenomegaly or mass  Tenderness: There is no abdominal tenderness  There is no right CVA tenderness, left CVA tenderness, guarding or rebound  Hernia: No hernia is present  Musculoskeletal:         General: No tenderness  Normal range of motion  Cervical back: Normal range of motion and neck supple  No rigidity  No muscular tenderness  Right lower leg: No tenderness  No edema  Left lower leg: No tenderness  No edema  Lymphadenopathy:      Cervical: No cervical adenopathy  Skin:     General: Skin is warm and dry  Capillary Refill: Capillary refill takes less than 2 seconds  Coloration: Skin is not jaundiced or pale  Findings: No ecchymosis or rash  Neurological:      General: No focal deficit present  Mental Status: She is alert and oriented to person, place, and time  Cranial Nerves: No cranial nerve deficit  Sensory: No sensory deficit  Motor: No weakness or abnormal muscle tone  Deep Tendon Reflexes: Reflexes normal    Psychiatric:         Mood and Affect: Mood normal          Behavior: Behavior normal          Thought Content:  Thought content normal          Judgment: Judgment normal          Vital Signs  ED Triage Vitals [01/15/22 0637]   Temperature Pulse Respirations Blood Pressure SpO2   98 6 °F (37 °C) 80 18 150/61 100 %      Temp Source Heart Rate Source Patient Position - Orthostatic VS BP Location FiO2 (%)   Oral Monitor -- Left arm --      Pain Score       No Pain           Vitals:    01/15/22 0637 01/15/22 0727 01/15/22 0842 01/15/22 0952   BP: 150/61 112/72 131/56 115/58   Pulse: 80 78 74 72         Visual Acuity      ED Medications  Medications   sodium chloride 0 9 % bolus 1,000 mL (0 mL Intravenous Stopped 1/15/22 1005)       Diagnostic Studies  Results Reviewed     Procedure Component Value Units Date/Time    hCG, quantitative [[id number]]  (Abnormal) Collected: 01/15/22 0655    Lab Status: Final result Specimen: Blood from Arm, Right Updated: 01/15/22 0728     HCG, Quant 9 32 mIU/mL     Narrative:      Non pregnant females:          <5 mIU/mL  Pregnant females, weeks post last menstrual period:  3 - 4 weeks                9 - 130 mIU/mL  4 - 5 weeks                75 - 2,600 mIU/mL  5 - 6 weeks                850 - 20,800 mIU/mL  6 - 7 weeks                4,000 - 100,200 mIU/mL  7 - 12 weeks               11,500 - 289,000 mIU/mL  12 - 16 weeks              18,300 - 137,000 mIU/mL  16 - 29 weeks              1,400 - 53,000 mIU/mL (second trimester)  29 - 41 weeks              940 - 60,000 mIU/mL (third trimester)      Protime-INR [[id number]]  (Normal) Collected: 01/15/22 0655    Lab Status: Final result Specimen: Blood from Arm, Right Updated: 01/15/22 0722     Protime 12 8 seconds      INR 0 97    APTT [[id number]]  (Normal) Collected: 01/15/22 0655    Lab Status: Final result Specimen: Blood from Arm, Right Updated: 01/15/22 0722     PTT 32 seconds     Comprehensive metabolic panel [[id number]]  (Abnormal) Collected: 01/15/22 0655    Lab Status: Final result Specimen: Blood from Arm, Right Updated: 01/15/22 0721     Sodium 138 mmol/L      Potassium 3 8 mmol/L      Chloride 105 mmol/L      CO2 25 mmol/L      ANION GAP 8 mmol/L      BUN 10 mg/dL      Creatinine 0 59 mg/dL      Glucose 107 mg/dL      Calcium 8 8 mg/dL      AST 14 U/L      ALT 16 U/L      Alkaline Phosphatase 65 0 U/L      Total Protein 7 2 g/dL      Albumin 4 2 g/dL      Total Bilirubin 0 94 mg/dL      eGFR 117 ml/min/1 73sq m     Narrative:      [place] guidelines for Chronic Kidney Disease (CKD):     Stage 1 with normal or high GFR (GFR > 90 mL/min/1 73 square meters)    Stage 2 Mild CKD (GFR = 60-89 mL/min/1 73 square meters)    Stage 3A Moderate CKD (GFR = 45-59 mL/min/1 73 square meters)    Stage 3B Moderate CKD (GFR = 30-44 mL/min/1 73 square meters)    Stage 4 Severe CKD (GFR = 15-29 mL/min/1 73 square meters)    Stage 5 End Stage CKD (GFR <15 mL/min/1 73 square meters)  Note: GFR calculation is accurate only with a steady state creatinine    CBC and differential [[id number]] Collected: 01/15/22 0655    Lab Status: Final result Specimen: Blood from Arm, Right Updated: 01/15/22 07     WBC 7 10 Thousand/uL      RBC 3 94 Million/uL      Hemoglobin 11 7 g/dL      Hematocrit 35 5 %      MCV 90 fL      MCH 29 7 pg      MCHC 33 0 g/dL      RDW 13 2 %      MPV 9 9 fL      Platelets 099 Thousands/uL      nRBC 0 /100 WBCs      Neutrophils Relative 64 %      Immat GRANS % 0 %      Lymphocytes Relative 27 %      Monocytes Relative 7 %      Eosinophils Relative 1 %      Basophils Relative 1 %      Neutrophils Absolute 4 63 Thousands/µL      Immature Grans Absolute 0 03 Thousand/uL      Lymphocytes Absolute 1 89 Thousands/µL      Monocytes Absolute 0 46 Thousand/µL      Eosinophils Absolute 0 05 Thousand/µL      Basophils Absolute 0 04 Thousands/µL                  US OB < 14 weeks single or first gestation level 1   Final Result by Elza Wetzel MD (01/15 2847)   No intrauterine gestation or adnexal mass identified  Differential remains early IUP, spontaneous  and ectopic pregnancy  Correlate with serial quantitative BHCG              Workstation performed: [id number]                    Procedures  Procedures         ED Course                                             MDM  Number of Diagnoses or Management Options  Diagnosis management comments: Report given to Dr Dominic Sinha at the end of my shift and care transferred       Amount and/or Complexity of Data Reviewed  Clinical lab tests: ordered  Tests in the radiology section of CPT®: ordered    Risk of Complications, Morbidity, and/or Mortality  Presenting problems: high  Diagnostic procedures: moderate  Management options: moderate        Disposition  Final diagnoses:   Vaginal bleeding   Pregnancy     Time reflects when diagnosis was documented in both MDM as applicable and the Disposition within this note     Time User Action Codes Description Comment    1/15/2022  9:59 AM Vanesa Barajas Add [N93 9] Vaginal bleeding     1/15/2022  9:59 AM Vanesa Barajas Add [Z34 90] Pregnancy       ED Disposition     ED Disposition Condition Date/Time Comment    Discharge Stable Sat Sean 15, 2022  9:59 AM Katherine[place]y [place] discharge to home/self care  Follow-up Information     Follow up With Specialties Details Why Contact Info Additional [address]  Emergency Department Emergency Medicine  If symptoms worsen [address] [facility],[address] [address]  [address] [facility] Emergency Department, [address], [address]          Discharge Medication List as of 1/15/2022 10:01 AM      CONTINUE these medications which have NOT CHANGED    Details   SUMAtriptan (IMITREX) 25 mg tablet Take 25 mg by mouth once as needed for migraine, Historical Med      acetaminophen (TYLENOL) 325 mg tablet Take 2 tablets (650 mg total) by mouth every 6 (six) hours as needed for moderate pain, Starting Wed 6/24/2020, No Print      ibuprofen (MOTRIN) 600 mg tablet Take 1 tablet (600 mg total) by mouth every 6 (six) hours as needed for moderate pain, Starting Wed 6/24/2020, No Print      medroxyPROGESTERone (DEPO-PROVERA) 150 mg/mL injection Inject 1 mL (150 mg total) into a muscle every 3 (three) months, Starting Thu 12/31/2020, Normal      propranolol (INDERAL) 40 mg tablet Take 1 tablet (40 mg total) by mouth 2 (two) times a day, Starting Tue 10/27/2020, Normal             No discharge procedures on file      PDMP Review     None          ED Provider  Electronically Signed by           Ar Reeder MD  01/15/22 5621

## 2022-01-15 NOTE — ED NOTES
Encouraged to keep rt arm straight for IVF infusion, call bell within reach bed, low position     [facility]  01/15/22 5440

## 2022-01-19 DIAGNOSIS — Z32.00 POSSIBLE PREGNANCY: Primary | ICD-10-CM

## 2022-01-22 ENCOUNTER — APPOINTMENT (OUTPATIENT)
Dept: LAB | Facility: HOSPITAL | Age: 37
End: 2022-01-22
Payer: MEDICARE

## 2022-01-22 DIAGNOSIS — Z32.00 POSSIBLE PREGNANCY: ICD-10-CM

## 2022-01-22 LAB — B-HCG SERPL-ACNC: <1 MIU/ML

## 2022-01-22 PROCEDURE — 84702 CHORIONIC GONADOTROPIN TEST: CPT

## 2022-01-22 PROCEDURE — 36415 COLL VENOUS BLD VENIPUNCTURE: CPT

## 2022-01-25 ENCOUNTER — TELEPHONE (OUTPATIENT)
Dept: OBGYN CLINIC | Facility: CLINIC | Age: 37
End: 2022-01-25

## 2022-01-27 ENCOUNTER — OFFICE VISIT (OUTPATIENT)
Dept: OBGYN CLINIC | Facility: CLINIC | Age: 37
End: 2022-01-27

## 2022-01-27 VITALS
WEIGHT: 209 LBS | HEIGHT: 61 IN | SYSTOLIC BLOOD PRESSURE: 107 MMHG | DIASTOLIC BLOOD PRESSURE: 75 MMHG | BODY MASS INDEX: 39.46 KG/M2 | HEART RATE: 91 BPM

## 2022-01-27 DIAGNOSIS — O03.9 SPONTANEOUS MISCARRIAGE: Primary | Chronic | ICD-10-CM

## 2022-01-27 PROCEDURE — 99213 OFFICE O/P EST LOW 20 MIN: CPT | Performed by: OBSTETRICS & GYNECOLOGY

## 2022-01-27 NOTE — ASSESSMENT & PLAN NOTE
- The HCG 9 on 1/15 and repeat on 1/22 1, besides UPT on 12/31 was negative  The diagnosis for miscarriage is not clear but  The management would not change since now HCG is negative    - She may desire to conceive, contraception options discussed but not given   - I encourage use of Folic acid   - Return to clinic for annual physical and PAP smear

## 2022-01-27 NOTE — PROGRESS NOTES
[facility] 39 y o  SUBJECTIVE    CC:  "f/u for miscarrigae"    HPI: Elijah Fletcher is a 39 y o  [id number] female presenting today for acute office visit follow up for miscarriage  Pregnancy diagnosed via HCG test which was 9-1 on 1/15 and 1/22 respectively  She decline vaginal bleeding, pelvic pain  She desire conception  The following portions of the patient's history were reviewed and updated as appropriate: allergies, current medications, past family history, past medical history, past social history, past surgical history and problem list         ROS  General: negative for chills or fever   Respiratory: no cough, shortness of breath, or wheezing  Cardiovascular: no chest pain or dyspnea on exertion  Gastrointestinal: no abdominal pain, change in bowel habits, or black or bloody stools  Urinary: no urinary symptoms  Genitourinary: Negative  Neurological: no TIA or stroke symptoms      OBJECTIVE  Vitals:    01/27/22 1444   BP: 107/75   BP Location: Right arm   Patient Position: Sitting   Cuff Size: Large   Pulse: 91   Weight: 94 8 kg (209 lb)   Height: 5' 1" (1 549 m)       General appearance: alert and oriented, in no acute distress      Lab Results:   No visits with results within 1 Day(s) from this visit  Latest known visit with results is:   Appointment on 01/22/2022   Component Date Value    HCG, Quant 01/22/2022 <1 0           ASSESSMENT&PLAN  Spontaneous miscarriage  - The HCG 9 on 1/15 and repeat on 1/22 1, besides UPT on 12/31 was negative  The diagnosis for miscarriage is not clear but  The management would not change since now HCG is negative  - She may desire to conceive, contraception options discussed but not given   - I encourage use of Folic acid   - Return to clinic for annual physical and PAP smear     Greater than 50% of total time was spent with the patient and / or family counseling and / or coordination of care     All questions were answered &  expressed understanding      Patient was seen and discussed with Dr Elva Spencer MD  OBGYN PGY-4  1/27/2022

## 2022-02-03 ENCOUNTER — TELEPHONE (OUTPATIENT)
Dept: OBGYN CLINIC | Facility: CLINIC | Age: 37
End: 2022-02-03

## 2022-03-14 ENCOUNTER — TELEPHONE (OUTPATIENT)
Dept: OBGYN CLINIC | Facility: CLINIC | Age: 37
End: 2022-03-14

## 2022-03-14 NOTE — TELEPHONE ENCOUNTER
Patient left message requesting surgery  Patient was contacted, left message with call back telephone number

## 2022-03-17 ENCOUNTER — TELEPHONE (OUTPATIENT)
Dept: OBGYN CLINIC | Facility: CLINIC | Age: 37
End: 2022-03-17

## 2022-04-21 ENCOUNTER — ULTRASOUND (OUTPATIENT)
Dept: OBGYN CLINIC | Facility: CLINIC | Age: 37
End: 2022-04-21

## 2022-04-21 VITALS
HEART RATE: 101 BPM | DIASTOLIC BLOOD PRESSURE: 76 MMHG | HEIGHT: 61 IN | WEIGHT: 209 LBS | SYSTOLIC BLOOD PRESSURE: 118 MMHG | BODY MASS INDEX: 39.46 KG/M2

## 2022-04-21 DIAGNOSIS — N91.2 AMENORRHEA: Primary | ICD-10-CM

## 2022-04-21 PROCEDURE — 99213 OFFICE O/P EST LOW 20 MIN: CPT | Performed by: OBSTETRICS & GYNECOLOGY

## 2022-04-21 PROCEDURE — 76801 OB US < 14 WKS SINGLE FETUS: CPT | Performed by: OBSTETRICS & GYNECOLOGY

## 2022-04-21 NOTE — PROGRESS NOTES
22 6Assessment  39 y o  [id number] at 9w2d presenting for amenorrhea  Pregnancy confirmed, dating consistent with LMP  RIKY 2022  Plan  Diagnoses and all orders for this visit:    Amenorrhea  -     Ambulatory Referral to Maternal Fetal Medicine; Future  -     Prenatal Panel; Future  -     AMB US OB < 14 weeks single or first gestation level 1      - Prenatal vitamin  - Prenatal panel (slips given today)  - Discussed genetic screening  - Prenatal Nursing Intake in 2 weeks  - Prenatal H&P in 4 weeks     ____________________________________________________________      Subjective   Maria A Miguel A is a 39 y o  [id number] with an LMP of Patient's last menstrual period was 02/15/2022  (9w2d  by LMP) who presents for amenorrhea  She notes she took a home pregnancy test on 3/30/2022 and it was positive  She is currently otherwise without complaint  Patient notes that this pregnancy was unplanned  She was not using contraception at the time  She reports she is certain of her LMP and that she has regular menses  She has has no vaginal bleeding since her LMP  Patient's prior pregnancies were complicated by  labor  Objective  /76 (BP Location: Left arm, Patient Position: Sitting, Cuff Size: Adult)   Pulse 101   Ht 5' 1" (1 549 m)   Wt 94 8 kg (209 lb)   LMP 02/15/2022   BMI 39 49 kg/m²       Physical Exam:  Physical Exam  Constitutional:       Appearance: Normal appearance  She is obese  Cardiovascular:      Rate and Rhythm: Normal rate  Pulses: Normal pulses  Pulmonary:      Effort: Pulmonary effort is normal    Abdominal:      Palpations: Abdomen is soft  Musculoskeletal:         General: Normal range of motion  Cervical back: Normal range of motion  Skin:     General: Skin is warm  Neurological:      General: No focal deficit present  Mental Status: She is alert and oriented to person, place, and time     Psychiatric:         Mood and Affect: Mood normal  Behavior: Behavior normal          Transvaginal Pelvic Ultrasound  Ramey IUP viable, LMP is working for dating, RIKY 11/22/2022  CRL 22 6 mm, consistent with EGA 9w0d   +yolk sac  +cardiac activity   bpm  No adnexal masses appreciated    Saint Kohler, MD  OBGYN, PGY-4  4/21/2022  4:06 PM

## 2022-05-05 ENCOUNTER — INITIAL PRENATAL (OUTPATIENT)
Dept: OBGYN CLINIC | Facility: CLINIC | Age: 37
End: 2022-05-05

## 2022-05-05 DIAGNOSIS — Z34.91 PRENATAL CARE IN FIRST TRIMESTER: ICD-10-CM

## 2022-05-05 DIAGNOSIS — O09.521 ELDERLY MULTIGRAVIDA IN FIRST TRIMESTER: Primary | ICD-10-CM

## 2022-05-05 PROCEDURE — T1001 NURSING ASSESSMENT/EVALUATN: HCPCS

## 2022-05-05 RX ORDER — FOLIC ACID 1 MG/1
400 TABLET ORAL DAILY
COMMUNITY

## 2022-05-05 NOTE — PROGRESS NOTES
OB INTAKE INTERVIEW  Pt presents for OB intake  [id number]  OB History    Para Term  AB Living   6 4 3 1 1 4   SAB IAB Ectopic Multiple Live Births   1 0 0 0 4      # Outcome Date GA Lbr Juan/2nd Weight Sex Delivery Anes PTL Lv   6 Current            5 Term 20 37w2d / 12:12 2995 g (6 lb 9 6 oz) M Vag-Spont EPI N GILL   4 SAB 17    M  None Y FD   3 Term 03/27/15    M Vag-Spont EPI  GILL   2 Term 05    F Vag-Spont None  GILL   1  10/18/02    F Vag-Spont None  GILL     Hx of  delivery prior to 36 weeks 6 days:  Yes   If yes, place a referral for cervical surveillance at 16 weeks  Last Menstrual Period:    Patient's last menstrual period was 02/15/2022  Ultrasound date: 2022  9 weeks 2 days     Estimated Date of Delivery: None noted  Confirmed by LMP or US    H&P visit scheduled        Last pap smear: 2017    Findings; lab pap smear results: no abnormalities    Current Issues:  Constipation :   No  Headaches :   Yes  Cramping:  No  Spotting :   No  PICA cravings :  No    FOB Involved:   Yes  Planned pregnancy:  No    I have these concerns about this prenatal patient:   Oldest daughter has von Willebrand disease  This is a different FOB  Unplanned but happy with pregnancy  Recent miscarriage in January  Previous delivery/loss at 25 weeks    Interview education     Baby and Me Handout  [facility] [place] and Me [facility] Handout   [facility] Handout   Discussed genetic testing   Prenatal lab work: Scripts printed and given to pt   Influenza vaccine given today: No   Discussed Tdap vaccine  Immunizations:   Immunization History   Administered Date(s) Administered    INFLUENZA 2014    MMR 2020    Tdap 2015, 2020     Depression Screening Follow-up Plan: Patient's depression screening was negative with an [place] score of  1  Clinically patient does not have depression  No treatment is required             Infection Screening: Does the pt have a hx of MRSA? No  If yes- please follow MRSA protocol and obtain a nasal swab for MRSA culture    The patient was oriented to our practice and all questions were answered    Interviewed by: Sonia Perales RN 05/05/22

## 2022-05-05 NOTE — LETTER
[address] Letter    Yordan Casanova  1985  [address] [id number] 05/05/22          Lucina Calle is a patient and under our care in our office  Lucina Barrett's Estimated Date of Delivery: 11/22/2022  Any questions or concerns feel free to contact our office       Thank you,    Chas  [id number] [facility]/[address]  [place]  [phone]   [facility] [place]/[address]  [phone]

## 2022-05-05 NOTE — LETTER
Dentist Letter          05/05/22      Nani Antony  1985  [address] [facility] [address]              We have had several requests from local dentist requesting permission to perform procedures on our patients who are pregnant  We wish to respond with this letter regarding some of the more routine procedures that we have been asked about  The following procedures may be performed on our obstetric patients:   1  Administration of local anesthesia   2  Administration of antibiotics such as PCN, Ampicillin, and Erythromycin  3  Administration of pain medications such as Tylenol, Tylenol with codeine, and if needed Percocet  4  Shielded X-rays    Should you have any questions, please do not hesitate to contact at [phone]          Sincerely,    [facility]   [address],  [place], [address] [facility]  [phone]

## 2022-05-05 NOTE — LETTER
Proof of Pregnancy Letter    [facility]  1985  [address] [address]        05/05/22      Lucina CAMARENA Oswaldo Forrest is a patient at our facility  Lucina Taveras Estimated Date of Delivery: 11/22/2022  Any questions or concerns, please feel free to contact our office      Sincerely,     [facility]   [address],   [place], [address] [place] [address]   [phone]

## 2022-05-05 NOTE — LETTER
Work Letter    Lucina Kent Darcy  1985  [address] [address]    Dear Izabela Samaniego,      05/05/22        Your employee is a patient at [facility]  We recommend that all pregnant women:    1  Have a well-ventilated workspace  2  Wear low-heeled shoes  3  Work no more than 40 hours per week  4  Have a 15 minute break every 2 hours and at least 30 minutes for a meal break  5  Use good body mechanics by bending at your knees to avoid back strain and lift no more than 20 pounds without assistance  Will need assistance with lifting over 20 lbs  6  Have ready access to bathrooms and water  She may continue to work until her due date unless medical complications arise  We anticipate she may return to work in 6-8 weeks after delivery       Sincerely,    [facility]  [address]  [place], [address] [place] [address]  [phone]

## 2022-05-12 ENCOUNTER — ROUTINE PRENATAL (OUTPATIENT)
Dept: OBGYN CLINIC | Facility: CLINIC | Age: 37
End: 2022-05-12

## 2022-05-12 VITALS
SYSTOLIC BLOOD PRESSURE: 117 MMHG | HEIGHT: 61 IN | WEIGHT: 209 LBS | DIASTOLIC BLOOD PRESSURE: 76 MMHG | HEART RATE: 96 BPM | BODY MASS INDEX: 39.46 KG/M2

## 2022-05-12 DIAGNOSIS — Z30.2 REQUEST FOR STERILIZATION: ICD-10-CM

## 2022-05-12 DIAGNOSIS — Z3A.12 12 WEEKS GESTATION OF PREGNANCY: ICD-10-CM

## 2022-05-12 DIAGNOSIS — Z34.92 PRENATAL CARE IN SECOND TRIMESTER: Primary | ICD-10-CM

## 2022-05-12 PROCEDURE — 87591 N.GONORRHOEAE DNA AMP PROB: CPT | Performed by: OBSTETRICS & GYNECOLOGY

## 2022-05-12 PROCEDURE — G0476 HPV COMBO ASSAY CA SCREEN: HCPCS | Performed by: OBSTETRICS & GYNECOLOGY

## 2022-05-12 PROCEDURE — G0145 SCR C/V CYTO,THINLAYER,RESCR: HCPCS | Performed by: OBSTETRICS & GYNECOLOGY

## 2022-05-12 PROCEDURE — 99213 OFFICE O/P EST LOW 20 MIN: CPT | Performed by: OBSTETRICS & GYNECOLOGY

## 2022-05-12 PROCEDURE — 87491 CHLMYD TRACH DNA AMP PROBE: CPT | Performed by: OBSTETRICS & GYNECOLOGY

## 2022-05-12 NOTE — PROGRESS NOTES
OB/GYN  PRENATAL H&P VISIT  Julissa Bernard  2022  2:35 PM  Dr Pricila Jason MD      SUBJECTIVE  Patient is a [id number] at 12w2d here for initial prenatal H&P  This is an intended and desired pregnancy  She is currently doing well  She works at [employer]  She decline hx of STD/STI, denies a hx of TB or close contacts with persons with TB  She decline had MRSA  She decline a family history of inheritable conditions such as physical or intellectual disabilities, birth defects, heart or neural tube defects  Daughter has vWF disease  She decline  recent travel or travel planned in the near future  She decline use of nicotine or recreational drug use  She decline use of ETOH  She denies vaginal bleeding, cramping, leakage, abnormal discharge  OB History    Para Term  AB Living   6 4 3 1 1 4   SAB IAB Ectopic Multiple Live Births   1 0 0 0 4      # Outcome Date GA Lbr Juan/2nd Weight Sex Delivery Anes PTL Lv   6 Current            5 Term 20 37w2d / 12:12 2995 g (6 lb 9 6 oz) M Vag-Spont EPI N GILL      Name: Laura Sotomayor ([address])      Apgar1: 9  Apgar5: 9   4 SAB 17    M  None Y FD   3 Term 03/27/15    M Vag-Spont EPI  GILL   2 Term 05    F Vag-Spont None  GILL   1  10/18/02    F Vag-Spont None  GILL       Review of Systems   Constitutional: Negative  HENT: Negative  Respiratory: Negative  Cardiovascular: Negative  Gastrointestinal: Positive for nausea  Genitourinary: Negative  Musculoskeletal: Negative          Past Medical History:   Diagnosis Date    17 weeks gestation of pregnancy 2020    Gallbladder attack     Kidney stones     Migraines     Varicella     Von Willebrand disease ([facility] [address] )     patient is carrier       Past Surgical History:   Procedure Laterality Date    LAPAROSCOPIC CHOLECYSTECTOMY      LITHOTRIPSY      TONSILLECTOMY         Social History     Socioeconomic History    Marital status: Single     Spouse name: Not on file    Number of children: Not on file    Years of education: Not on file    Highest education level: Not on file   Occupational History    Not on file   Tobacco Use    Smoking status: Never Smoker    Smokeless tobacco: Never Used   Vaping Use    Vaping Use: Never used   Substance and Sexual Activity    Alcohol use: No    Drug use: No    Sexual activity: Yes     Partners: Male     Birth control/protection: None   Other Topics Concern    Not on file   Social History Narrative    Not on file     Social Determinants of Health     Financial Resource Strain: Low Risk     Difficulty of Paying Living Expenses: Not hard at all   Food Insecurity: No Food Insecurity    Worried About [address] [place] [address] in the Last Year: Never true    Siva of Food in the Last Year: Never true   Transportation Needs: No Transportation Needs    Lack of Transportation (Medical): No    Lack of Transportation (Non-Medical): No   Physical Activity: Not on file   Stress: Not on file   Social Connections: Not on file   Intimate Partner Violence: Not At Risk    Fear of Current or Ex-Partner: No    Emotionally Abused: No    Physically Abused: No    Sexually Abused: No   Housing Stability: Low Risk     Unable to Pay for Housing in the Last Year: No    Number of Places Lived in the Last Year: 1    Unstable Housing in the Last Year: No       OBJECTIVE  Vitals:    05/12/22 1422   BP: 117/76   Pulse: 96     Physical Exam  Constitutional:       General: She is not in acute distress  Appearance: Normal appearance  She is normal weight  She is not ill-appearing or toxic-appearing  Cardiovascular:      Rate and Rhythm: Normal rate  Pulses: Normal pulses  Heart sounds: No murmur heard  Pulmonary:      Effort: Pulmonary effort is normal    Abdominal:      Palpations: Abdomen is soft  Musculoskeletal:         General: Normal range of motion  Cervical back: Normal range of motion     Neurological: General: No focal deficit present  Mental Status: She is alert and oriented to person, place, and time  Skin:     General: Skin is warm  Psychiatric:         Mood and Affect: Mood normal          Behavior: Behavior normal          ASSESSMENT AND PLAN    39 y o , [id number], with /76 (BP Location: Left arm, Patient Position: Sitting, Cuff Size: Adult)   Pulse 96   Ht 5' 1" (1 549 m)   Wt 94 8 kg (209 lb)   LMP 02/15/2022 , at 12w2d here for her prenatal H&P   by US    Pregnancy: H&P completed today  PN Labs reviewed today  Labor expectations discussed with patient, including appointment schedule, nutrition, exercise, medications, sexual intercourse, and nausea/vomiting  Patient's BMI is 39  Recommended weight gain is 12-20lbs  Screening: Pap smear collected  GC/CT collected  Sequential screening reviewed with patient - will proceed with sequentiual and desire to complete NIPT test      Consents: Delivery process including potential OVD and  reviewed  Sign delivery consent form at 28 weeks  Labor: For analgesia, patient plans on epidural     Contraception: Different methods of contraception were discussed with patient, including progesterone only oral pills, depo provera, nexplanon, mirena, and paragard  Patient would like to use sterilization during postpartum phase  Follow up: RTC in 4 weeks  Precautions regarding labor, leakage, bleeding, and fetal movement reviewed      Dorene Bermudez MD    8:79 PM

## 2022-05-13 PROBLEM — O09.899 RUBELLA NON-IMMUNE STATUS, ANTEPARTUM: Status: RESOLVED | Noted: 2020-03-10 | Resolved: 2022-05-13

## 2022-05-13 PROBLEM — Z28.39 RUBELLA NON-IMMUNE STATUS, ANTEPARTUM: Status: RESOLVED | Noted: 2020-03-10 | Resolved: 2022-05-13

## 2022-05-14 LAB
C TRACH DNA SPEC QL NAA+PROBE: NEGATIVE
N GONORRHOEA DNA SPEC QL NAA+PROBE: NEGATIVE

## 2022-05-16 PROBLEM — O03.9 SPONTANEOUS MISCARRIAGE: Chronic | Status: RESOLVED | Noted: 2022-01-27 | Resolved: 2022-05-16

## 2022-05-16 PROBLEM — O09.899 HISTORY OF PRETERM DELIVERY, CURRENTLY PREGNANT: Status: ACTIVE | Noted: 2022-05-16

## 2022-05-16 PROBLEM — Z3A.13 13 WEEKS GESTATION OF PREGNANCY: Status: ACTIVE | Noted: 2022-04-21

## 2022-05-16 PROBLEM — O99.210 OBESITY AFFECTING PREGNANCY: Status: ACTIVE | Noted: 2022-05-16

## 2022-05-16 PROBLEM — O09.529 AMA (ADVANCED MATERNAL AGE) MULTIGRAVIDA 35+: Status: ACTIVE | Noted: 2022-05-16

## 2022-05-16 PROBLEM — Z00.00 HEALTH MAINTENANCE EXAMINATION: Status: RESOLVED | Noted: 2020-10-27 | Resolved: 2022-05-16

## 2022-05-16 PROBLEM — Z01.419 ENCOUNTER FOR GYNECOLOGICAL EXAMINATION WITHOUT ABNORMAL FINDING: Status: RESOLVED | Noted: 2021-03-24 | Resolved: 2022-05-16

## 2022-05-16 LAB
HPV HR 12 DNA CVX QL NAA+PROBE: NEGATIVE
HPV16 DNA CVX QL NAA+PROBE: NEGATIVE
HPV18 DNA CVX QL NAA+PROBE: NEGATIVE

## 2022-05-18 ENCOUNTER — ROUTINE PRENATAL (OUTPATIENT)
Dept: PERINATAL CARE | Facility: OTHER | Age: 37
End: 2022-05-18
Payer: MEDICARE

## 2022-05-18 ENCOUNTER — APPOINTMENT (OUTPATIENT)
Dept: LAB | Facility: CLINIC | Age: 37
End: 2022-05-18
Payer: MEDICARE

## 2022-05-18 VITALS
BODY MASS INDEX: 38.79 KG/M2 | HEART RATE: 94 BPM | WEIGHT: 205.47 LBS | SYSTOLIC BLOOD PRESSURE: 132 MMHG | DIASTOLIC BLOOD PRESSURE: 77 MMHG | HEIGHT: 61 IN

## 2022-05-18 DIAGNOSIS — Z36.82 ENCOUNTER FOR (NT) NUCHAL TRANSLUCENCY SCAN: ICD-10-CM

## 2022-05-18 DIAGNOSIS — O09.521 MULTIGRAVIDA OF ADVANCED MATERNAL AGE IN FIRST TRIMESTER: ICD-10-CM

## 2022-05-18 DIAGNOSIS — N91.2 AMENORRHEA: ICD-10-CM

## 2022-05-18 DIAGNOSIS — N91.2 AMENORRHEA: Primary | ICD-10-CM

## 2022-05-18 DIAGNOSIS — Z36.9 UNSPECIFIED ANTENATAL SCREENING: ICD-10-CM

## 2022-05-18 DIAGNOSIS — Z33.1 PREGNANT STATE, INCIDENTAL: ICD-10-CM

## 2022-05-18 DIAGNOSIS — Z3A.13 13 WEEKS GESTATION OF PREGNANCY: ICD-10-CM

## 2022-05-18 LAB
ABO GROUP BLD: NORMAL
BACTERIA UR QL AUTO: ABNORMAL /HPF
BASOPHILS # BLD AUTO: 0.04 THOUSANDS/ΜL (ref 0–0.1)
BASOPHILS NFR BLD AUTO: 1 % (ref 0–1)
BILIRUB UR QL STRIP: NEGATIVE
BLD GP AB SCN SERPL QL: NEGATIVE
CLARITY UR: ABNORMAL
COLOR UR: YELLOW
EOSINOPHIL # BLD AUTO: 0.03 THOUSAND/ΜL (ref 0–0.61)
EOSINOPHIL NFR BLD AUTO: 0 % (ref 0–6)
ERYTHROCYTE [DISTWIDTH] IN BLOOD BY AUTOMATED COUNT: 13.2 % (ref 11.6–15.1)
GLUCOSE UR STRIP-MCNC: NEGATIVE MG/DL
HBV SURFACE AG SER QL: NORMAL
HCT VFR BLD AUTO: 36.8 % (ref 34.8–46.1)
HGB BLD-MCNC: 11.8 G/DL (ref 11.5–15.4)
HGB UR QL STRIP.AUTO: ABNORMAL
IMM GRANULOCYTES # BLD AUTO: 0.04 THOUSAND/UL (ref 0–0.2)
IMM GRANULOCYTES NFR BLD AUTO: 1 % (ref 0–2)
KETONES UR STRIP-MCNC: NEGATIVE MG/DL
LEUKOCYTE ESTERASE UR QL STRIP: NEGATIVE
LYMPHOCYTES # BLD AUTO: 1.69 THOUSANDS/ΜL (ref 0.6–4.47)
LYMPHOCYTES NFR BLD AUTO: 21 % (ref 14–44)
MCH RBC QN AUTO: 29.4 PG (ref 26.8–34.3)
MCHC RBC AUTO-ENTMCNC: 32.1 G/DL (ref 31.4–37.4)
MCV RBC AUTO: 92 FL (ref 82–98)
MONOCYTES # BLD AUTO: 0.47 THOUSAND/ΜL (ref 0.17–1.22)
MONOCYTES NFR BLD AUTO: 6 % (ref 4–12)
NEUTROPHILS # BLD AUTO: 5.66 THOUSANDS/ΜL (ref 1.85–7.62)
NEUTS SEG NFR BLD AUTO: 71 % (ref 43–75)
NITRITE UR QL STRIP: NEGATIVE
NON-SQ EPI CELLS URNS QL MICRO: ABNORMAL /HPF
NRBC BLD AUTO-RTO: 0 /100 WBCS
PH UR STRIP.AUTO: 6 [PH]
PLATELET # BLD AUTO: 256 THOUSANDS/UL (ref 149–390)
PMV BLD AUTO: 11.2 FL (ref 8.9–12.7)
PROT UR STRIP-MCNC: NEGATIVE MG/DL
RBC # BLD AUTO: 4.01 MILLION/UL (ref 3.81–5.12)
RBC #/AREA URNS AUTO: ABNORMAL /HPF
RH BLD: POSITIVE
RPR SER QL: NORMAL
RUBV IGG SERPL IA-ACNC: 48.2 IU/ML
SP GR UR STRIP.AUTO: 1.02 (ref 1–1.03)
SPECIMEN EXPIRATION DATE: NORMAL
UROBILINOGEN UR QL STRIP.AUTO: 0.2 E.U./DL
WBC # BLD AUTO: 7.93 THOUSAND/UL (ref 4.31–10.16)
WBC #/AREA URNS AUTO: ABNORMAL /HPF

## 2022-05-18 PROCEDURE — 99242 OFF/OP CONSLTJ NEW/EST SF 20: CPT | Performed by: OBSTETRICS & GYNECOLOGY

## 2022-05-18 PROCEDURE — 76813 OB US NUCHAL MEAS 1 GEST: CPT | Performed by: OBSTETRICS & GYNECOLOGY

## 2022-05-18 PROCEDURE — 36415 COLL VENOUS BLD VENIPUNCTURE: CPT

## 2022-05-18 PROCEDURE — 81001 URINALYSIS AUTO W/SCOPE: CPT

## 2022-05-18 PROCEDURE — 87086 URINE CULTURE/COLONY COUNT: CPT

## 2022-05-18 PROCEDURE — 80081 OBSTETRIC PANEL INC HIV TSTG: CPT

## 2022-05-18 RX ORDER — ASPIRIN 81 MG/1
162 TABLET ORAL DAILY
Qty: 60 TABLET | Refills: 5 | Status: SHIPPED | OUTPATIENT
Start: 2022-05-18

## 2022-05-18 NOTE — PROGRESS NOTES
Patient chose to have [facility] Non-invasive Prenatal Screen  Patient given brochure and is aware [facility] will contact patients insurance and coordinate coverage  Patient made aware she will need to respond to text message or e-mail from [employer] within 2 business days or testing will be run through insurance  Patient informed text message will come from area code  "415"  Provided [address] [place] [address] Street # [phone] and web site : [email or web address]  Lab kit and  given to patient to have drawn at [facility] outpatient lab immediately  Copy of lab order scanned to Epic media  Maternal Fetal Medicine will have results in approximately 7-10 business days and will call patient or notify via [address]  Patient aware viewing lab result online will reveal fetal sex If ordered  Patient verbalized understanding of all instructions and no questions at this time

## 2022-05-18 NOTE — PROGRESS NOTES
[address]: Ms Adama Dick was seen today at 13w1d for nuchal translucency ultrasound  See ultrasound report under "OB Procedures" tab  My recommendations are as follows:  1  We reviewed the availability of genetic screening, as well as diagnostic testing, which are available to all pregnant women  We reviewed her age-related aneuploidy risk  We reviewed limitations, risks, and benefits of screening and testing  We reviewed the differences between Sequential Screen and NIPT (non-invasive prenatal screening) as well as that insurance coverage and therefore out-of-pocket costs may vary  She elected to proceed with NIPT, and was provided a lab requisition to have it drawn, as well as information on how to estimate her out of pocket cost and need for possible prior authorization  MSAFP screening should be ordered through your office at 15-20 weeks gestation, and completed prior to fetal anatomic survey  She does not wish to pursue diagnostic testing at this time  A detailed anatomic survey as well as transvaginal cervical length screening are recommended between 18-22 weeks gestation  2  Body mass index > 30 kg/m2 is associated with an increased risk of several pregnancy complications, including hypertensive disorders, diabetes, abnormal fetal growth, fetal malformations  The risk of  delivery is also increased, as are wound complications in the event of  delivery  A healthy diet and exercise, as well as appropriate gestational weight gain (no more than 11-20 pounds) can help reduce risk of these complications  150 minutes of moderate exercise per week is recommended for all pregnant women  Nutrition counseling is also available if desired    Early screening for gestational diabetes is recommended, as well as routine re-screening at 24-28 weeks if early screening results are normal   fetal surveillance is also recommended as follows:  Pre-Pregnancy BMI 35-39 9: Evaluation of fetal growth at 32 weeks gestation, and weekly antepartum surveillance at 37 weeks gestation  3  The use of low dose aspirin in pregnancy (162mg) is recommended in women with a high risk, or multiple moderate risk factors for preeclampsia  Aspirin therapy should be initiated between 12-28 weeks gestation, and is most effective if started prior to 16 weeks gestation, and stopped by 36 weeks gestation  Low dose aspirin in pregnancy has been shown to reduce the incidence of preeclampsia in women with risk factors, and has been shown to be safe and without significant maternal or fetal risk  In light of her risk factors which include maternal age and BMI, I recommend initiating aspirin therapy, which was prescribed today  4  We reviewed her history of spontaneous  delivery, which increases her risk for  birth in this pregnancy  In women with a history of spontaneous  birth, treatment with progesterone therapy from 16-36 weeks gestation may reduce recurrence risk  Studies evaluating the effectiveness of progesterone therapy in reducing recurrence risk for  delivery have produced conflicting results regarding effectiveness, but have not suggested  harm with treatment  Progesterone can either be given as a weekly injection (IM or SQ) of 17-OH progesterone caproate, or as a vaginal suppository of 200mg daily  Either route of progesterone is a reasonable option endorsed by the Society for Maternal-Fetal Medicine  Serial cervical surveillance (by transvaginal sonography) is helpful in predicting risk of  delivery  Measurement of transvaginal cervical length is recommended between 16 0/7 and 23 6/7 weeks gestation  Cervical length equal to or less than 25mm before 24 weeks gestation would prompt recommendation for a transvaginal ultrasound-indicated cerclage   In women with a history of spontaneous  delivery, approximately 1/3 will require cerclage placement, and 2/3 will have normal cervical length screening and need progesterone therapy alone  Surveillance for signs and symptoms of  labor and rupture of membranes are recommended during the pregnancy  After our discussion, she plans to pursue serial cervical surveillance, but declines progesterone unless she experiences cervical shortening       Please don't hesitate to contact our office with any concerns or questions     -Carlos Richter MD

## 2022-05-18 NOTE — PATIENT INSTRUCTIONS
Please refer to " Ultrasound" under test results in [employer] for today's ultrasound findings  Congratulations, and best wishes for a healthy remainder of the pregnancy! The use of low dose aspirin in pregnancy (162mg) is recommended in women with a high risk, or multiple moderate risk factors for preeclampsia  Aspirin therapy should be initiated between 12-28 weeks gestation, and is most effective if started prior to 16 weeks gestation, and continued until 36 weeks gestation  Low dose aspirin in pregnancy has been shown to reduce the incidence of preeclampsia in women with risk factors, and has been shown to be safe and without significant maternal or fetal risk  In light of your risk factors for preeclampsia, including: Body Mass Index 30 or greater and Maternal Age 28 or greater I recommend initiating aspirin therapy, which was prescribed today  You elected to have non-invasive screening for genetic syndrome performed for your pregnancy  This involves a blood test to check for the four most common genetic syndromes (Trisomy 21, Trisomy 13, Trisomy 25, and sex chromosome abnormalities)  It also will report the biologic sex of the fetus  Results will be visible in your [employer] portal 7-10 business days from when the test is drawn  Please follow all instructions regarding determining out of pocket cost for the test (as provided by our nursing staff today), as well as follow any instructions regarding need for prior authorization before having the test drawn  Please contact our office with any concerns or questions  You will need spina bifida screening (called MSAFP) for the baby beginning at 15 weeks gestation, which will be ordered by your obstetrician's office  This test allows for earlier detection of spina bifida than is possible by ultrasound, and is advised in all pregnancies  Thank you for choosing [address] [place] [address] for your visit today   We appreciate your trust and the opportunity to assist your obstetrician with your care  We value your feedback regarding the care we are providing  Following today's appointment, you may receive a patient satisfaction survey by mail or e-mail requesting feedback on your visit  We ask that you complete the survey to  help us understand how we are doing  Thank you for in advance for your feedback

## 2022-05-18 NOTE — LETTER
May 18, 1412     Saint Kohler, MD  [address]  [address] [place] [address]    Patient: Kym Benton   YOB: 1985   Date of Visit: 2022       Dear Dr Keven Mendoza:    Thank you for referring Neha Norwood to me for evaluation  Below are my notes for this consultation  If you have questions, please do not hesitate to call me  I look forward to following your patient along with you  Sincerely,        Joanie Martínez MD        CC: No Recipients  Joanie Martínez MD  2022 10:31 AM  Sign when Signing Visit  [address]: Ms Rusty Randhawa was seen today at 13w1d for nuchal translucency ultrasound  See ultrasound report under "OB Procedures" tab  My recommendations are as follows:  1  We reviewed the availability of genetic screening, as well as diagnostic testing, which are available to all pregnant women  We reviewed her age-related aneuploidy risk  We reviewed limitations, risks, and benefits of screening and testing  We reviewed the differences between Sequential Screen and NIPT (non-invasive prenatal screening) as well as that insurance coverage and therefore out-of-pocket costs may vary  She elected to proceed with NIPT, and was provided a lab requisition to have it drawn, as well as information on how to estimate her out of pocket cost and need for possible prior authorization  MSAFP screening should be ordered through your office at 15-20 weeks gestation, and completed prior to fetal anatomic survey  She does not wish to pursue diagnostic testing at this time  A detailed anatomic survey as well as transvaginal cervical length screening are recommended between 18-22 weeks gestation  2  Body mass index > 30 kg/m2 is associated with an increased risk of several pregnancy complications, including hypertensive disorders, diabetes, abnormal fetal growth, fetal malformations   The risk of  delivery is also increased, as are wound complications in the event of  delivery  A healthy diet and exercise, as well as appropriate gestational weight gain (no more than 11-20 pounds) can help reduce risk of these complications  150 minutes of moderate exercise per week is recommended for all pregnant women  Nutrition counseling is also available if desired  Early screening for gestational diabetes is recommended, as well as routine re-screening at 24-28 weeks if early screening results are normal   fetal surveillance is also recommended as follows:  Pre-Pregnancy BMI 35-39 9:  Evaluation of fetal growth at 32 weeks gestation, and weekly antepartum surveillance at 37 weeks gestation  3  The use of low dose aspirin in pregnancy (162mg) is recommended in women with a high risk, or multiple moderate risk factors for preeclampsia  Aspirin therapy should be initiated between 12-28 weeks gestation, and is most effective if started prior to 16 weeks gestation, and stopped by 36 weeks gestation  Low dose aspirin in pregnancy has been shown to reduce the incidence of preeclampsia in women with risk factors, and has been shown to be safe and without significant maternal or fetal risk  In light of her risk factors which include maternal age and BMI, I recommend initiating aspirin therapy, which was prescribed today  4  We reviewed her history of spontaneous  delivery, which increases her risk for  birth in this pregnancy  In women with a history of spontaneous  birth, treatment with progesterone therapy from 16-36 weeks gestation may reduce recurrence risk  Studies evaluating the effectiveness of progesterone therapy in reducing recurrence risk for  delivery have produced conflicting results regarding effectiveness, but have not suggested  harm with treatment  Progesterone can either be given as a weekly injection (IM or SQ) of 17-OH progesterone caproate, or as a vaginal suppository of 200mg daily  Either route of progesterone is a reasonable option endorsed by the Society for Maternal-Fetal Medicine  Serial cervical surveillance (by transvaginal sonography) is helpful in predicting risk of  delivery  Measurement of transvaginal cervical length is recommended between 16 0/7 and 23 6/7 weeks gestation  Cervical length equal to or less than 25mm before 24 weeks gestation would prompt recommendation for a transvaginal ultrasound-indicated cerclage  In women with a history of spontaneous  delivery, approximately 1/3 will require cerclage placement, and 2/3 will have normal cervical length screening and need progesterone therapy alone  Surveillance for signs and symptoms of  labor and rupture of membranes are recommended during the pregnancy  After our discussion, she plans to pursue serial cervical surveillance, but declines progesterone unless she experiences cervical shortening       Please don't hesitate to contact our office with any concerns or questions     -Brooke Rodriguez MD

## 2022-05-19 LAB
BACTERIA UR CULT: NORMAL
HIV 1+2 AB+HIV1 P24 AG SERPL QL IA: NORMAL
MISCELLANEOUS LAB TEST RESULT: NORMAL

## 2022-05-20 ENCOUNTER — PATIENT OUTREACH (OUTPATIENT)
Dept: OBGYN CLINIC | Facility: CLINIC | Age: 37
End: 2022-05-20

## 2022-05-20 LAB
LAB AP GYN PRIMARY INTERPRETATION: NORMAL
Lab: NORMAL

## 2022-05-23 ENCOUNTER — TELEPHONE (OUTPATIENT)
Dept: OBGYN CLINIC | Facility: CLINIC | Age: 37
End: 2022-05-23

## 2022-05-23 NOTE — TELEPHONE ENCOUNTER
----- Message from Lenny Forrest MD sent at 1/04/4648  5:49 PM EDT -----  Prenatal panel reviewed and which is grossly normal, urine culture is contaminated  We may repeat urine culture if there is concern  HPV result is negative, Pap smear is reported as negative for impaired intraepithelial neoplasia repeat in 5 years with HPV testing recommended      Lenny Forrest MD  OBN, PGY-4  5/20/2022  5:48 PM

## 2022-05-23 NOTE — TELEPHONE ENCOUNTER
Called and inform pt that prenatal panels were normal, Pap smear which is negative, HPV also negative  Pt understood and had no questions

## 2022-05-27 ENCOUNTER — TELEPHONE (OUTPATIENT)
Dept: FAMILY MEDICINE CLINIC | Facility: CLINIC | Age: 37
End: 2022-05-27

## 2022-05-27 DIAGNOSIS — O23.42 URINARY TRACT INFECTION IN MOTHER DURING SECOND TRIMESTER OF PREGNANCY: Primary | ICD-10-CM

## 2022-05-27 RX ORDER — NITROFURANTOIN 25; 75 MG/1; MG/1
100 CAPSULE ORAL 2 TIMES DAILY
Qty: 14 CAPSULE | Refills: 0 | Status: SHIPPED | OUTPATIENT
Start: 2022-05-27 | End: 2022-06-03

## 2022-05-27 NOTE — TELEPHONE ENCOUNTER
Called patient to verify if she was still with our practice and if we were still her PCP, I left her a voicemail for her to give us a call back

## 2022-06-09 ENCOUNTER — ROUTINE PRENATAL (OUTPATIENT)
Dept: OBGYN CLINIC | Facility: CLINIC | Age: 37
End: 2022-06-09

## 2022-06-09 VITALS
BODY MASS INDEX: 38.33 KG/M2 | WEIGHT: 203 LBS | DIASTOLIC BLOOD PRESSURE: 74 MMHG | HEIGHT: 61 IN | SYSTOLIC BLOOD PRESSURE: 122 MMHG | HEART RATE: 97 BPM

## 2022-06-09 DIAGNOSIS — R35.0 URINARY FREQUENCY: Primary | ICD-10-CM

## 2022-06-09 DIAGNOSIS — Z3A.16 16 WEEKS GESTATION OF PREGNANCY: ICD-10-CM

## 2022-06-09 DIAGNOSIS — Z34.92 PRENATAL CARE IN SECOND TRIMESTER: ICD-10-CM

## 2022-06-09 PROCEDURE — 87086 URINE CULTURE/COLONY COUNT: CPT | Performed by: NURSE PRACTITIONER

## 2022-06-09 PROCEDURE — 99213 OFFICE O/P EST LOW 20 MIN: CPT | Performed by: NURSE PRACTITIONER

## 2022-06-09 NOTE — PROGRESS NOTES
Keith Nails presents today for a routine OB visit at 16w2d  Blood Pressure: 122/74  Wt=92 1 kg (203 lb); Body mass index is 38 36 kg/m² ; TWG=Not found  Fetal Heart Rate: 152; Fundal Height (cm): 16 cm  Abdomen: gravid, soft, non-tender  She reports nausea and vomiting are improving- declines need for medication  Denies uterine contractions or persistent cramping  Denies vaginal bleeding or leaking of fluid  Reports fetal movement  Reviewed MSAFP and pt desires- reviewed is time sensitive  Urine culture sent today- recently treated for a UTI  Scheduled for ultrasound, has cervical surveillance appt tomorrow with M  She has level 2 US scheduled 7/5/2022  Reviewed common discomforts of pregnancy in second trimester and warning signs  Advised to continue medications and return in 4 weeks  Current Outpatient Medications   Medication Instructions    acetaminophen (TYLENOL) 650 mg, Oral, Every 6 hours PRN    aspirin (ECOTRIN LOW STRENGTH) 162 mg, Oral, Daily, Stop at 36 weeks gestation    folic acid (FOLVITE) 983 mcg, Daily    Prenatal MV-Min-Fe Fum-FA-DHA (PRENATAL 1 PO) 1 tablet, Oral, Daily    propranolol (INDERAL) 40 mg, Oral, 2 times daily    SUMAtriptan (IMITREX) 25 mg, Once as needed         Pregnancy Problems (from 02/15/22 to present)     No problems associated with this episode

## 2022-06-10 ENCOUNTER — ROUTINE PRENATAL (OUTPATIENT)
Dept: PERINATAL CARE | Facility: CLINIC | Age: 37
End: 2022-06-10
Payer: MEDICARE

## 2022-06-10 VITALS
DIASTOLIC BLOOD PRESSURE: 60 MMHG | HEIGHT: 61 IN | HEART RATE: 94 BPM | SYSTOLIC BLOOD PRESSURE: 118 MMHG | WEIGHT: 202.6 LBS | BODY MASS INDEX: 38.25 KG/M2

## 2022-06-10 DIAGNOSIS — O09.899 HISTORY OF PRETERM DELIVERY, CURRENTLY PREGNANT: Primary | ICD-10-CM

## 2022-06-10 DIAGNOSIS — Z03.75 ENCOUNTER FOR SUSPECTED CERVICAL SHORTENING RULED OUT: ICD-10-CM

## 2022-06-10 PROCEDURE — 76817 TRANSVAGINAL US OBSTETRIC: CPT | Performed by: OBSTETRICS & GYNECOLOGY

## 2022-06-10 PROCEDURE — 76815 OB US LIMITED FETUS(S): CPT | Performed by: OBSTETRICS & GYNECOLOGY

## 2022-06-10 NOTE — PROGRESS NOTES
Ultrasound Probe Disinfection    A transvaginal ultrasound was performed  Prior to use, disinfection was performed with High Level Disinfection Process (Trophon)  Probe serial number [id number]: [id number] was used        Mayelin Matias  06/10/22  1:10 PM

## 2022-06-10 NOTE — PATIENT INSTRUCTIONS
Thank you for choosing us for your  care today  If you have any questions about your ultrasound or care, please do not hesitate to contact us or your primary obstetrician  Some general instructions for your pregnancy are:    Protect against coronavirus: get vaccinated - pregnant women are increased risk of severe COVID  Notify your primary care doctor if you have any symptoms  Exercise: Aim for 22 minutes per day (150 minutes per week) of regular exercise  Walking is great! Nutrition: aim for calcium-rich and iron-rich foods as well as healthy sources of protein  Learn about Preeclampsia: preeclampsia is a common, serious high blood pressure complication in pregnancy  A blood pressure of 847ZLNU (systolic or top number) or 68FZLT (diastolic or bottom number) is not normal and needs evaluation by your doctor  Aspirin is sometimes prescribed in early pregnancy to prevent preeclampsia in women with risk factors - ask your obstetrician if you should be on this medication  If you smoke, try to reduce how many cigarettes you smoke or try to quit completely  Do not vape  Other warning signs to watch out for in pregnancy or postpartum: chest pain, obstructed breathing or shortness of breath, seizures, thoughts of hurting yourself or your baby, bleeding, a painful or swollen leg, fever, or headache (see AWHONN POST-BIRTH Warning Signs campaign)  If these happen call 911  Itching is also not normal in pregnancy and if you experience this, especially over your hands and feet, potentially worse at night, notify your doctors

## 2022-06-11 LAB — BACTERIA UR CULT: NORMAL

## 2022-06-13 ENCOUNTER — TELEPHONE (OUTPATIENT)
Dept: OBGYN CLINIC | Facility: CLINIC | Age: 37
End: 2022-06-13

## 2022-06-13 NOTE — TELEPHONE ENCOUNTER
----- Message from Cassie Dubose, [address] sent at 6/13/2022 12:52 PM EDT -----  Please inform pt that her urine showed low count of mixed contaminants, no mian for treatment  Thank You!

## 2022-06-13 NOTE — TELEPHONE ENCOUNTER
LM for pt to access lab results via [employer] and to call back [address] with any questions/concerns

## 2022-06-14 NOTE — PROGRESS NOTES
[address]: Ms Lobo Hadley was seen today for serial cervical length screening ultrasound  See ultrasound report under "OB Procedures" tab  Please don't hesitate to contact our office with any concerns or questions    Oj Dumas MD

## 2022-06-16 ENCOUNTER — PATIENT OUTREACH (OUTPATIENT)
Dept: OBGYN CLINIC | Facility: CLINIC | Age: 37
End: 2022-06-16

## 2022-06-21 NOTE — PATIENT INSTRUCTIONS
Thank you for choosing us for your  care today  If you have any questions about your ultrasound or care, please do not hesitate to contact us or your primary obstetrician  Some general instructions for your pregnancy are:    Protect against coronavirus: get vaccinated - pregnant women are increased risk of severe COVID  Notify your primary care doctor if you have any symptoms  Exercise: Aim for 22 minutes per day (150 minutes per week) of regular exercise  Walking is great! Nutrition: aim for calcium-rich and iron-rich foods as well as healthy sources of protein  Learn about Preeclampsia: preeclampsia is a common, serious high blood pressure complication in pregnancy  A blood pressure of 100GFBO (systolic or top number) or 30AHRE (diastolic or bottom number) is not normal and needs evaluation by your doctor  Aspirin is sometimes prescribed in early pregnancy to prevent preeclampsia in women with risk factors - ask your obstetrician if you should be on this medication  If you smoke, try to reduce how many cigarettes you smoke or try to quit completely  Do not vape  Other warning signs to watch out for in pregnancy or postpartum: chest pain, obstructed breathing or shortness of breath, seizures, thoughts of hurting yourself or your baby, bleeding, a painful or swollen leg, fever, or headache (see AWHONN POST-BIRTH Warning Signs campaign)  If these happen call 911  Itching is also not normal in pregnancy and if you experience this, especially over your hands and feet, potentially worse at night, notify your doctors

## 2022-06-22 ENCOUNTER — ROUTINE PRENATAL (OUTPATIENT)
Dept: PERINATAL CARE | Facility: CLINIC | Age: 37
End: 2022-06-22
Payer: MEDICARE

## 2022-06-22 VITALS
WEIGHT: 204 LBS | SYSTOLIC BLOOD PRESSURE: 112 MMHG | BODY MASS INDEX: 40.05 KG/M2 | HEART RATE: 84 BPM | HEIGHT: 60 IN | DIASTOLIC BLOOD PRESSURE: 58 MMHG

## 2022-06-22 DIAGNOSIS — Z03.75 ENCOUNTER FOR SUSPECTED CERVICAL SHORTENING RULED OUT: ICD-10-CM

## 2022-06-22 DIAGNOSIS — O09.899 HISTORY OF PRETERM DELIVERY, CURRENTLY PREGNANT: Primary | ICD-10-CM

## 2022-06-22 DIAGNOSIS — Z3A.18 18 WEEKS GESTATION OF PREGNANCY: ICD-10-CM

## 2022-06-22 PROCEDURE — 76815 OB US LIMITED FETUS(S): CPT | Performed by: OBSTETRICS & GYNECOLOGY

## 2022-06-22 PROCEDURE — 76817 TRANSVAGINAL US OBSTETRIC: CPT | Performed by: OBSTETRICS & GYNECOLOGY

## 2022-06-22 NOTE — PROGRESS NOTES
[address]: Ms Aj Martínez was seen today for serial cervical length screening ultrasound  See ultrasound report under "OB Procedures" tab  Please don't hesitate to contact our office with any concerns or questions    Kvng Brown MD

## 2022-07-05 ENCOUNTER — ROUTINE PRENATAL (OUTPATIENT)
Dept: PERINATAL CARE | Facility: OTHER | Age: 37
End: 2022-07-05
Payer: MEDICARE

## 2022-07-05 VITALS
HEIGHT: 61 IN | DIASTOLIC BLOOD PRESSURE: 74 MMHG | SYSTOLIC BLOOD PRESSURE: 118 MMHG | HEART RATE: 97 BPM | BODY MASS INDEX: 38.92 KG/M2 | WEIGHT: 206.13 LBS

## 2022-07-05 DIAGNOSIS — O09.899 HISTORY OF PRETERM DELIVERY, CURRENTLY PREGNANT: ICD-10-CM

## 2022-07-05 DIAGNOSIS — Z3A.20 20 WEEKS GESTATION OF PREGNANCY: ICD-10-CM

## 2022-07-05 DIAGNOSIS — O09.522 MULTIGRAVIDA OF ADVANCED MATERNAL AGE IN SECOND TRIMESTER: Primary | ICD-10-CM

## 2022-07-05 PROCEDURE — 76817 TRANSVAGINAL US OBSTETRIC: CPT | Performed by: OBSTETRICS & GYNECOLOGY

## 2022-07-05 PROCEDURE — 76811 OB US DETAILED SNGL FETUS: CPT | Performed by: OBSTETRICS & GYNECOLOGY

## 2022-07-05 PROCEDURE — 99213 OFFICE O/P EST LOW 20 MIN: CPT | Performed by: OBSTETRICS & GYNECOLOGY

## 2022-07-05 NOTE — PROGRESS NOTES
The patient was seen today for an ultrasound  Please see ultrasound report (located under Ob Procedures) for additional details  Thank you very much for allowing us to participate in the care of this very nice patient  Should you have any questions, please do not hesitate to contact me  Meng Barrios MD [address]  Attending Physician, Fabienne

## 2022-07-05 NOTE — PROGRESS NOTES
Ultrasound Probe Disinfection    A transvaginal ultrasound was performed  Prior to use, disinfection was performed with High Level Disinfection Process (Trophon)  Probe serial number A1: [id number] was used        Dee Burr  07/05/22  1:08 PM

## 2022-07-12 ENCOUNTER — PATIENT OUTREACH (OUTPATIENT)
Dept: OBGYN CLINIC | Facility: CLINIC | Age: 37
End: 2022-07-12

## 2022-07-17 NOTE — PATIENT INSTRUCTIONS
Thank you for choosing us for your  care today  If you have any questions about your ultrasound or care, please do not hesitate to contact us or your primary obstetrician  Some general instructions for your pregnancy are:    Protect against coronavirus: get vaccinated - pregnant women are increased risk of severe COVID  Notify your primary care doctor if you have any symptoms  Exercise: Aim for 22 minutes per day (150 minutes per week) of regular exercise  Walking is great! Nutrition: aim for calcium-rich and iron-rich foods as well as healthy sources of protein  Learn about Preeclampsia: preeclampsia is a common, serious high blood pressure complication in pregnancy  A blood pressure of 697PWEO (systolic or top number) or 27NDJG (diastolic or bottom number) is not normal and needs evaluation by your doctor  Aspirin is sometimes prescribed in early pregnancy to prevent preeclampsia in women with risk factors - ask your obstetrician if you should be on this medication  If you smoke, try to reduce how many cigarettes you smoke or try to quit completely  Do not vape  Other warning signs to watch out for in pregnancy or postpartum: chest pain, obstructed breathing or shortness of breath, seizures, thoughts of hurting yourself or your baby, bleeding, a painful or swollen leg, fever, or headache (see AWHONN POST-BIRTH Warning Signs campaign)  If these happen call 911  Itching is also not normal in pregnancy and if you experience this, especially over your hands and feet, potentially worse at night, notify your doctors  Options for compounded vaginal progesterone:    Name Phone # Fax # Address Website  Notes   [facility] [phone] or [phone] [address] [place] [address] [employer]/ Generally, out of pocket costs for a 200mg 30 day supply is $54 per their pharmacy     [address] [place] [address]  [phone] or [phone] [address], [place], [address] [facility] [place] [facility] [place]   [facility]/index html Generally, out of pocket costs for a 200mg 30 day supply is $52 though pharmacy indicates their pricing can change at any time  [employer] and [place]  [phone]  [address], [address] [employer] [facility]/ Generally, out of pocket costs for a 200mg 30 day supply is $52 50, per their pharmacy  [address]  [phone] or [phone] [address], [address] [employer]/ Generally, out of pocket costs for a 200mg 30 day supply is $134, per their pharmacy      Options for non-compounded progesterone nightly, from commercial pharmacies:  Generic Progesterone Oral Capsules in 100mg or 200mg dose, typically run $2-4 each (out-of-pocket if no insurance coverage)  Insert vaginally (not in the mouth)  Prometrium Oral Capsules (micronized progesterone), in 100mg or 200mg dose, typically run $13-26 each (out-of-pocket if no insurance coverage)  Insert vaginally (not in the mouth)  Endometrin Vaginal Insert, 100mg, micronized progesterone; looks like an oval tablet, comes in single foil packs) typically costs $15 each (out-of-pocket if no insurance coverage) and is inserted vaginally with an applicator  200mg is the correct dose  Crinone Vaginal Gel (8% = 90mg; box of 15 costs around $600)    We do not prescribe this often due to high cost

## 2022-07-20 ENCOUNTER — ROUTINE PRENATAL (OUTPATIENT)
Dept: PERINATAL CARE | Facility: OTHER | Age: 37
End: 2022-07-20
Payer: MEDICARE

## 2022-07-20 VITALS
WEIGHT: 208.56 LBS | BODY MASS INDEX: 39.38 KG/M2 | HEART RATE: 103 BPM | SYSTOLIC BLOOD PRESSURE: 115 MMHG | HEIGHT: 61 IN | DIASTOLIC BLOOD PRESSURE: 62 MMHG

## 2022-07-20 DIAGNOSIS — O09.899 HISTORY OF PRETERM DELIVERY, CURRENTLY PREGNANT: Primary | ICD-10-CM

## 2022-07-20 DIAGNOSIS — Z03.75 ENCOUNTER FOR SUSPECTED CERVICAL SHORTENING RULED OUT: ICD-10-CM

## 2022-07-20 PROCEDURE — 76815 OB US LIMITED FETUS(S): CPT | Performed by: OBSTETRICS & GYNECOLOGY

## 2022-07-20 PROCEDURE — 99214 OFFICE O/P EST MOD 30 MIN: CPT | Performed by: OBSTETRICS & GYNECOLOGY

## 2022-07-20 PROCEDURE — 76817 TRANSVAGINAL US OBSTETRIC: CPT | Performed by: OBSTETRICS & GYNECOLOGY

## 2022-07-20 RX ORDER — PROGESTERONE 200 MG/1
200 CAPSULE ORAL
Qty: 30 CAPSULE | Refills: 5 | Status: SHIPPED | OUTPATIENT
Start: 2022-07-20

## 2022-07-20 NOTE — PROGRESS NOTES
[address] [place] [address]: Ms Eliud Murphy was seen today for serial cervical length screening ultrasound  See ultrasound report under "OB Procedures" tab  The time spent on this established patient on the encounter date included 5 minutes previsit service time reviewing records and precharting, 7 minutes face-to-face service time counseling regarding results and coordinating care, and  5 minutes charting, totalling 17 minutes  Please don't hesitate to contact our office with any concerns or questions    Madhav Patel MD

## 2022-07-20 NOTE — PROGRESS NOTES
Ultrasound Probe Disinfection    A transvaginal ultrasound was performed  Prior to use, disinfection was performed with High Level Disinfection Process (Trophon)  Probe serial number A1: [id number] was used        Tutu Ortiz  07/20/22  12:53 PM

## 2022-07-21 DIAGNOSIS — Z3A.22 22 WEEKS GESTATION OF PREGNANCY: ICD-10-CM

## 2022-07-21 DIAGNOSIS — Z34.92 PRENATAL CARE IN SECOND TRIMESTER: Primary | ICD-10-CM

## 2022-07-22 ENCOUNTER — ROUTINE PRENATAL (OUTPATIENT)
Dept: OBGYN CLINIC | Facility: CLINIC | Age: 37
End: 2022-07-22

## 2022-07-22 VITALS
DIASTOLIC BLOOD PRESSURE: 72 MMHG | SYSTOLIC BLOOD PRESSURE: 107 MMHG | HEART RATE: 97 BPM | HEIGHT: 61 IN | BODY MASS INDEX: 38.89 KG/M2 | WEIGHT: 206 LBS

## 2022-07-22 DIAGNOSIS — Z34.92 PRENATAL CARE IN SECOND TRIMESTER: Primary | ICD-10-CM

## 2022-07-22 DIAGNOSIS — Z3A.22 22 WEEKS GESTATION OF PREGNANCY: ICD-10-CM

## 2022-07-22 PROCEDURE — 99213 OFFICE O/P EST LOW 20 MIN: CPT | Performed by: OBSTETRICS & GYNECOLOGY

## 2022-07-22 NOTE — PROGRESS NOTES
Peace Moss presents today for routine OB visit at 22w3d  Blood Pressure: 107/72  Wt=93 4 kg (206 lb); Body mass index is 38 92 kg/m² ; TWG=Not found  Fetal Heart Rate: 150; Fundal Height (cm): 24 cm  Abdomen: gravid, soft, non-tender  She reports carpal tunnel sydrome  Denies uterine contractions or persistent cramping  Denies vaginal bleeding or leaking of fluid  Reports fetal movement  Scheduled for ultrasound 09/28/22  Reviewed common discomforts of pregnancy in second trimester and warning signs  Advised to continue medications and return in 4 weeks  Current Outpatient Medications   Medication Instructions    aspirin (ECOTRIN LOW STRENGTH) 162 mg, Oral, Daily, Stop at 36 weeks gestation    folic acid (FOLVITE) 556 mcg, Oral, Daily    Prenatal MV-Min-Fe Fum-FA-DHA (PRENATAL 1 PO) 1 tablet, Oral, Daily    Progesterone 200 mg, Vaginal, SL MYCHART Nightly         Pregnancy Problems (from 02/15/22 to present)     Problem Noted Resolved    20 weeks gestation of pregnancy 6/59/1905 by Ellen Duval MD No    Overview Addendum 7/7/2022  8:06 AM by Ayala Villa MD     Pre-pregnancy BMI 39, weight gain 11-20lbs   Daughter with vWD, patient is carrier  Pap/HPV NILM, HPV neg 5/22  Treated for UTI early in pregnancy with negative test of cure  A positive blood type  Level II US 7/5/22: Posterior placenta, EFW 280g, anatomic survey complete   Repeat growth in 2 weeks         Previous Version

## 2022-08-05 ENCOUNTER — PATIENT OUTREACH (OUTPATIENT)
Dept: OBGYN CLINIC | Facility: CLINIC | Age: 37
End: 2022-08-05

## 2022-08-16 NOTE — PROGRESS NOTES
Lucina Galarza a 44 y o  [id number] presents today for routine OB visit at 26w0d  She reports left hand numbness as in prior pegnancies  Denies uterine contractions or persistent cramping  Denies vaginal bleeding or leaking of fluid  Reports fetal movement  Denies Dysuria  FHR: 144      Fundal Height: 26 cm      Visit Vitals  LMP 02/15/2022   OB Status Pregnant   Smoking Status Never Smoker     Review of Systems   Constitutional: Negative for chills, fatigue and fever  Respiratory: Negative for shortness of breath  Cardiovascular: Negative for chest pain  Gastrointestinal: Negative for diarrhea, nausea and vomiting  Genitourinary: Negative for dysuria and vaginal discharge  Neurological: Positive for numbness (Left sided carpal tunnel syn)  Physical Exam  Constitutional:       General: She is not in acute distress  HENT:      Head: Normocephalic  Nose: No rhinorrhea  Eyes:      General: No scleral icterus  Pulmonary:      Effort: Pulmonary effort is normal       Breath sounds: No wheezing or rales  Abdominal:      Comments: Gravid, non tender   Skin:     General: Skin is warm and dry  Neurological:      Mental Status: She is alert  Plan:   Scheduled for next ultrasound 09/28  Reviewed common discomforts of pregnancy in second trimester and warning signs  Advised to continue medications and return in 4 weeks  Will obtain L wrist splint  Current Outpatient Medications:     aspirin (ECOTRIN LOW STRENGTH) 81 mg EC tablet, Take 2 tablets (162 mg total) by mouth in the morning  Stop at 36 weeks gestation  , Disp: 60 tablet, Rfl: 5    folic acid (FOLVITE) 1 mg tablet, Take 400 mcg by mouth daily, Disp: , Rfl:     Prenatal MV-Min-Fe Fum-FA-DHA (PRENATAL 1 PO), Take 1 tablet by mouth in the morning, Disp: , Rfl:     Progesterone 200 MG CAPS, Insert 200 mg into the vagina at bedtime, Disp: 30 capsule, Rfl: 5      Pregnancy Problems (from 02/15/22 to present) Problem Noted Resolved    20 weeks gestation of pregnancy 5/55/4981 by Calixto Hart MD No    Overview Addendum 7/7/2022  8:06 AM by Raul De La Vega MD     Pre-pregnancy BMI 39, weight gain 11-20lbs   Daughter with vWD, patient is carrier  Pap/HPV NILM, HPV neg 5/22  Treated for UTI early in pregnancy with negative test of cure  A positive blood type  Level II US 7/5/22: Posterior placenta, EFW 280g, anatomic survey complete   Repeat growth in 2 weeks         Previous Version

## 2022-08-19 ENCOUNTER — ROUTINE PRENATAL (OUTPATIENT)
Dept: OBGYN CLINIC | Facility: CLINIC | Age: 37
End: 2022-08-19

## 2022-08-19 VITALS
SYSTOLIC BLOOD PRESSURE: 112 MMHG | BODY MASS INDEX: 39.46 KG/M2 | HEIGHT: 61 IN | HEART RATE: 103 BPM | WEIGHT: 209 LBS | DIASTOLIC BLOOD PRESSURE: 75 MMHG

## 2022-08-19 DIAGNOSIS — Z3A.26 26 WEEKS GESTATION OF PREGNANCY: Primary | ICD-10-CM

## 2022-08-19 PROCEDURE — 99213 OFFICE O/P EST LOW 20 MIN: CPT | Performed by: OBSTETRICS & GYNECOLOGY

## 2022-08-31 PROBLEM — Z3A.28 28 WEEKS GESTATION OF PREGNANCY: Status: ACTIVE | Noted: 2022-04-21

## 2022-08-31 PROBLEM — Z34.93 PRENATAL CARE IN THIRD TRIMESTER: Status: ACTIVE | Noted: 2022-06-09

## 2022-08-31 NOTE — PROGRESS NOTES
OB/GYN prenatal visit    S: 44 y o  [id number] 28w2d here for PN visit  She has no obstetric complaints, including pelvic pain, contractions, vaginal bleeding, loss of fluid, or decreased fetal movement  She reports her left hand numbness is worsening despite use of brace  This feels like her carpal tunnel  O:  Vitals:    22 1300   BP: 111/75   Pulse: 105       Gen: no acute distress, nonlabored breathing  Fundal Height (cm): 29 cm  Fetal Heart Rate: 140    A/P:      IUP at 28w2d  No obstetric complaints today  TWG: + 1lbs (recommended weight gain 11-20lbs)  TDAP given today   MA-31 signed today - Patient reports has signed this multiple times in the past but has gotten pregnant prior to being able to schedule  She would like the tubal scheduled as soon after delivery as possible  Discussed coordinating postpartum care and surgical H&P visit to attempt to schedule tubal right around 6 week postpartum, to coordinate patient's time off  Discussed LARCs as well as options for immediate postpartum  She is aware that sterilization is PERMANENT and IRREVERSIBLE  She desires permanent sterilization  Delivery consent signed today   28 week labs ordered today  Patient reports she has never been able to complete the 1h GTT and isn't able to drink that much that fast  Discussed doing fingersticks, she is not interested  Discussed adding CMP and A1c on to the labs and obtaining them fasting for evaluation  Carpal Tunnel: Patient reports worsening left sided carpal tunnel and sciatic pain   Referral to OT placed, consider PT referral if needed    Discussed  labor precautions and fetal kick counts    Return to clinic in 2 weeks      COVID 19 precautions were discussed with patient at length, reviewed symptoms, hygiene, social distancing, patient to call office  with questions/concerns    Discussed with Dr Josue Nova MD  2022  1:45 PM    Problem List        Cardiovascular and Mediastinum Migraine    Overview     Takes imitrex and propranolol when not pregnant, asymptomatic during pregnancy            Other    Request for sterilization    Overview     Desires permanent sterilization  Needs MA-31 at 28 weeks         28 weeks gestation of pregnancy    Overview     Pre-pregnancy BMI 39, weight gain 11-20lbs   Daughter with vWD, patient is carrier  Pap/HPV NILM, HPV neg   Treated for UTI early in pregnancy with negative test of cure  A positive blood type  Level II US 22: Posterior placenta, EFW 280g, anatomic survey complete     Last ultrasound 22: Normal cervix, normal MADISON, repeat ultrasound 22         AMA (advanced maternal age) multigravida 35+    Overview     NIPT with negative screening, low risk  AFP not yet completed, to complete prior to 22 weeks         Obesity affecting pregnancy    Overview     Recommended weight gain 11-20lbs         History of  delivery, currently pregnant    Overview     3 spontaneous  deliveries, earliest at 22 weeks  35 weeks    Undergoing cervical length surveillance through MFM         Urinary frequency    Prenatal care in third trimester      Other Visit Diagnoses     Carpal tunnel syndrome during pregnancy        Need for Tdap vaccination

## 2022-09-01 ENCOUNTER — ROUTINE PRENATAL (OUTPATIENT)
Dept: OBGYN CLINIC | Facility: CLINIC | Age: 37
End: 2022-09-01

## 2022-09-01 VITALS
SYSTOLIC BLOOD PRESSURE: 111 MMHG | DIASTOLIC BLOOD PRESSURE: 75 MMHG | BODY MASS INDEX: 39.65 KG/M2 | HEART RATE: 105 BPM | WEIGHT: 210 LBS | HEIGHT: 61 IN

## 2022-09-01 DIAGNOSIS — Z34.93 PRENATAL CARE IN THIRD TRIMESTER: Primary | ICD-10-CM

## 2022-09-01 DIAGNOSIS — O26.899 CARPAL TUNNEL SYNDROME DURING PREGNANCY: ICD-10-CM

## 2022-09-01 DIAGNOSIS — Z3A.28 28 WEEKS GESTATION OF PREGNANCY: ICD-10-CM

## 2022-09-01 DIAGNOSIS — O09.899 HISTORY OF PRETERM DELIVERY, CURRENTLY PREGNANT: ICD-10-CM

## 2022-09-01 DIAGNOSIS — Z30.2 REQUEST FOR STERILIZATION: ICD-10-CM

## 2022-09-01 DIAGNOSIS — Z23 NEED FOR TDAP VACCINATION: ICD-10-CM

## 2022-09-01 DIAGNOSIS — G56.00 CARPAL TUNNEL SYNDROME DURING PREGNANCY: ICD-10-CM

## 2022-09-01 DIAGNOSIS — O99.213 OBESITY AFFECTING PREGNANCY IN THIRD TRIMESTER: ICD-10-CM

## 2022-09-01 DIAGNOSIS — O09.523 MULTIGRAVIDA OF ADVANCED MATERNAL AGE IN THIRD TRIMESTER: ICD-10-CM

## 2022-09-01 LAB
DME PARACHUTE DELIVERY DATE REQUESTED: NORMAL
DME PARACHUTE ITEM DESCRIPTION: NORMAL
DME PARACHUTE ORDER STATUS: NORMAL
DME PARACHUTE SUPPLIER NAME: NORMAL
DME PARACHUTE SUPPLIER PHONE: NORMAL

## 2022-09-01 PROCEDURE — 99213 OFFICE O/P EST LOW 20 MIN: CPT | Performed by: OBSTETRICS & GYNECOLOGY

## 2022-09-01 PROCEDURE — 90471 IMMUNIZATION ADMIN: CPT

## 2022-09-01 PROCEDURE — 90715 TDAP VACCINE 7 YRS/> IM: CPT

## 2022-09-01 NOTE — PROGRESS NOTES
28 week education packet provided to patient on 09/01/22  · Delivery Consent Signed  · DJ87 Signed  · Tdap Given  · Breast Pump order placed    Included in packet:   Third Trimester paperwork  Delivery consent   Birthing room support person rules and acknowledgment  Birth Plan   Welcome information  Birth certificate worksheet   Consent for Photographers  Perineal/ Vaginal massage   Pediatric practices and locations

## 2022-09-16 ENCOUNTER — ROUTINE PRENATAL (OUTPATIENT)
Dept: OBGYN CLINIC | Facility: CLINIC | Age: 37
End: 2022-09-16

## 2022-09-16 VITALS
DIASTOLIC BLOOD PRESSURE: 77 MMHG | BODY MASS INDEX: 40.59 KG/M2 | HEIGHT: 61 IN | WEIGHT: 215 LBS | HEART RATE: 102 BPM | SYSTOLIC BLOOD PRESSURE: 110 MMHG

## 2022-09-16 DIAGNOSIS — Z34.93 PRENATAL CARE IN THIRD TRIMESTER: Primary | ICD-10-CM

## 2022-09-16 DIAGNOSIS — Z3A.30 30 WEEKS GESTATION OF PREGNANCY: ICD-10-CM

## 2022-09-16 PROCEDURE — 99213 OFFICE O/P EST LOW 20 MIN: CPT | Performed by: OBSTETRICS & GYNECOLOGY

## 2022-09-16 NOTE — PROGRESS NOTES
Babak Wills presents today for routine OB visit at 30w3d  Blood Pressure: 110/77  Wt=97 5 kg (215 lb); Body mass index is 40 62 kg/m² ; TWG=2 723 kg (6 lb 0 1 oz)   ; Abdomen: gravid, soft, non-tender  She reports back discomfort from working  Denies uterine contractions  Denies vaginal bleeding or leaking of fluid  Reports adequate fetal movement of at least 10 movements in 2 hours once daily  Scheduled for ultrasound 09/28/22  She is aware that she has blood work pending  Reviewed premature labor precautions and fetal kick counts  Advised to continue medications and return in 2 weeks  Current Outpatient Medications   Medication Instructions    aspirin (ECOTRIN LOW STRENGTH) 162 mg, Oral, Daily, Stop at 36 weeks gestation    folic acid (FOLVITE) 213 mcg, Oral, Daily    Prenatal MV-Min-Fe Fum-FA-DHA (PRENATAL 1 PO) 1 tablet, Oral, Daily    Progesterone 200 mg, Vaginal, SL MYCHART Nightly         Pregnancy Problems (from 02/15/22 to present)     Problem Noted Resolved    28 weeks gestation of pregnancy 4/95/0146 by Sobia Trent MD No    Overview Addendum 8/31/2022  7:34 PM by Eugene Martinez MD     Pre-pregnancy BMI 39, weight gain 11-20lbs   Daughter with vWD, patient is carrier  Pap/HPV NILM, HPV neg 5/22  Treated for UTI early in pregnancy with negative test of cure  A positive blood type  Level II US 7/5/22: Posterior placenta, EFW 280g, anatomic survey complete     Last ultrasound 7/20/22: Normal cervix, normal MADISON, repeat ultrasound 9/28/22         Previous Version

## 2022-09-28 ENCOUNTER — ULTRASOUND (OUTPATIENT)
Dept: PERINATAL CARE | Facility: CLINIC | Age: 37
End: 2022-09-28
Payer: MEDICARE

## 2022-09-28 VITALS
SYSTOLIC BLOOD PRESSURE: 114 MMHG | BODY MASS INDEX: 42.01 KG/M2 | DIASTOLIC BLOOD PRESSURE: 64 MMHG | HEART RATE: 100 BPM | WEIGHT: 214 LBS | HEIGHT: 60 IN

## 2022-09-28 DIAGNOSIS — O09.523 MULTIGRAVIDA OF ADVANCED MATERNAL AGE IN THIRD TRIMESTER: ICD-10-CM

## 2022-09-28 DIAGNOSIS — Z3A.32 32 WEEKS GESTATION OF PREGNANCY: Primary | ICD-10-CM

## 2022-09-28 DIAGNOSIS — O99.213 OBESITY AFFECTING PREGNANCY IN THIRD TRIMESTER: ICD-10-CM

## 2022-09-28 DIAGNOSIS — Z36.89 ENCOUNTER FOR ULTRASOUND TO ASSESS FETAL GROWTH: ICD-10-CM

## 2022-09-28 PROBLEM — R35.0 URINARY FREQUENCY: Status: RESOLVED | Noted: 2022-06-09 | Resolved: 2022-09-28

## 2022-09-28 PROBLEM — O36.60X0 FETAL MACROSOMIA: Status: ACTIVE | Noted: 2022-09-28

## 2022-09-28 PROBLEM — IMO0002 FETAL MACROSOMIA: Status: ACTIVE | Noted: 2022-09-28

## 2022-09-28 PROCEDURE — 99214 OFFICE O/P EST MOD 30 MIN: CPT | Performed by: OBSTETRICS & GYNECOLOGY

## 2022-09-28 PROCEDURE — 76816 OB US FOLLOW-UP PER FETUS: CPT | Performed by: OBSTETRICS & GYNECOLOGY

## 2022-09-28 NOTE — PROGRESS NOTES
[address] [facility] [address]: Ms Sloane Khoury was seen today at 32w1d for fetal growth assessment ultrasound  See ultrasound report under "OB Procedures" tab    Please don't hesitate to contact our office with any concerns or questions   -Gracie Henley

## 2022-09-30 ENCOUNTER — ROUTINE PRENATAL (OUTPATIENT)
Dept: OBGYN CLINIC | Facility: CLINIC | Age: 37
End: 2022-09-30

## 2022-09-30 VITALS
BODY MASS INDEX: 41.82 KG/M2 | WEIGHT: 213 LBS | HEIGHT: 60 IN | SYSTOLIC BLOOD PRESSURE: 122 MMHG | HEART RATE: 112 BPM | DIASTOLIC BLOOD PRESSURE: 79 MMHG

## 2022-09-30 DIAGNOSIS — Z3A.32 32 WEEKS GESTATION OF PREGNANCY: Primary | ICD-10-CM

## 2022-09-30 PROCEDURE — 99213 OFFICE O/P EST LOW 20 MIN: CPT | Performed by: OBSTETRICS & GYNECOLOGY

## 2022-09-30 NOTE — PROGRESS NOTES
OB/GYN  PN Visit  Joaquim Mancini  [id number]  22  2:25 PM  Dr Julito Navarrete    S: 44 y o  [id number] 32w2d here for PN visit  OB complaints:  Ctxns: [place] leblanc  Leakage: none  Bleeding: None  Fetal movement: yes    She reports no symptomatic concerns   She is worried that baby is currently breech on ultrasound    O:  Vitals:    22 1340   BP: 122/79   Pulse: (!) 112       Gen: no acute distress, nonlabored breathing  OB exam completed: fundal height 33 cm, FHTs 144, ultrasound if indicated    A/P:  #1  32w2d GESTATION  Labor precautions reviewed  Fetal kick counts reviewed  Pre-pregnancy BMI 39, weight gain 11-20lbs  Daughter with vWD, patient is carrier  Pap/HPV neg   Treated for UTI early in pregnancy with negative test of cure  A+ blood type  Level 2 US 22: Posterior placentia, EFW 280g, anatomic survey complete  Last ultrasound  accelerated fetal growth with breech presentation - will use glucometer for diabetes screening with Dieticians due to unable to tolerated glucola - Initiation of weekly antepartum surveillance at 37 weeks based on pre-gravid BMI  Patient is reminded to complete prenatal panel  RTC in 2 weeks        Future Appointments   Date Time Provider Rito Camacho   10/14/2022  2:30 PM [address] [address]   10/18/2022  8:00 AM DO [facility]   2022  9:30 AM  US 1  [facility]   2022  9:30 AM  US  [facility]   2022 11:00 AM  US [address] [place] [address]   2022 11:00 AM  US  [facility]       Discussed with Dr Steven Jolly  10/7/2022  2:25 PM

## 2022-10-03 ENCOUNTER — DOCUMENTATION (OUTPATIENT)
Dept: PERINATAL CARE | Facility: CLINIC | Age: 37
End: 2022-10-03

## 2022-10-03 ENCOUNTER — TELEMEDICINE (OUTPATIENT)
Dept: PERINATAL CARE | Facility: CLINIC | Age: 37
End: 2022-10-03
Payer: MEDICARE

## 2022-10-03 DIAGNOSIS — O09.523 MULTIGRAVIDA OF ADVANCED MATERNAL AGE IN THIRD TRIMESTER: ICD-10-CM

## 2022-10-03 DIAGNOSIS — Z13.1 SCREENING FOR DIABETES MELLITUS: Primary | ICD-10-CM

## 2022-10-03 DIAGNOSIS — O99.213 OBESITY AFFECTING PREGNANCY IN THIRD TRIMESTER: ICD-10-CM

## 2022-10-03 DIAGNOSIS — O36.63X0 FETAL MACROSOMIA IN THIRD TRIMESTER, SINGLE OR UNSPECIFIED FETUS: ICD-10-CM

## 2022-10-03 PROCEDURE — G0108 DIAB MANAGE TRN  PER INDIV: HCPCS | Performed by: DIETITIAN, REGISTERED

## 2022-10-03 RX ORDER — LANCETS
EACH MISCELLANEOUS
Qty: 100 EACH | Refills: 0 | Status: SHIPPED | OUTPATIENT
Start: 2022-10-03 | End: 2022-11-22

## 2022-10-03 RX ORDER — BLOOD-GLUCOSE METER
EACH MISCELLANEOUS
Qty: 1 KIT | Refills: 0 | Status: SHIPPED | OUTPATIENT
Start: 2022-10-03 | End: 2022-11-22

## 2022-10-03 RX ORDER — PERPHENAZINE 16 MG/1
TABLET, FILM COATED ORAL
Qty: 50 STRIP | Refills: 0 | Status: SHIPPED | OUTPATIENT
Start: 2022-10-03 | End: 2022-11-22

## 2022-10-03 NOTE — PROGRESS NOTES
Demographics:  Language: English  Ethnicity: White/   Country of Origin: [place]  Highest grade completed: High School Diploma  Occupation: OtherLaborer  Employer [employer]  Hours worked per week: Full Time (40 hours/week)  Shift worked: Morning (6 AM-2 PM)    Personal & Family History:  Personal history of diabetes? no  Family members with diabetes: None  How many total pregnancies have you had? Other 12  How many children do you have? 4  Have you had a baby weighing larger than 9 lbs? no  Are you having swelling or fluid retention? Yes, starting to retain small amount of fluids in her feet which is what happens in her pregnancies normally  i   Have you been placed on bedrest? no    Nutrition & Physical Activity:  Do you exercise? yes  Type of Exercise: Other Active job working in daily  Chases her toddler on days off  Frequency of Exercise: Daily  How many meals do you eat daily? 2  List times of meals: Other 9-10:30 AM & 5 PM  How many snacks do you eat daily? Other Several times duirng day with bagels, toast, & oatmeal she keeps in her locker at her job  List times of snacks: Other Varying times  What type of diet are you following at home? Regular  Do you have special Lutheran or ethnic dietary preferences? no  Do you have any food allergies or intolerances? no  Do you receive WIC or food stamps? yes Gets both    Learning preferences: How do you learn best? Reading  How do you rate your health? Good  Who is your primary support person? Family Member Sister  How do you cope with stress? States she handle stress well

## 2022-10-03 NOTE — PROGRESS NOTES
Virtual Brief Visit    Patient is located in the following state in which I hold an active license [place]      Assessment/Plan:    Problem List Items Addressed This Visit        Other    AMA (advanced maternal age) multigravida 35+    Obesity affecting pregnancy    Fetal macrosomia      Other Visit Diagnoses     Screening for diabetes mellitus    -  Primary          Recent Visits  No visits were found meeting these conditions  Showing recent visits within past 7 days and meeting all other requirements  Today's Visits  Date Type Provider Dept   10/03/22 [address] [facility]   Showing today's visits and meeting all other requirements  Future Appointments  No visits were found meeting these conditions  Showing future appointments within next 150 days and meeting all other requirements     Unable to connect via [employer] or Epic Embedded  Did virtual appointment via telephone as a result  I spent 30 minutes directly with the patient during this visit       Thank you for referring your patient to [facility] Maternal Fetal Medicine Diabetes in Pregnancy Program      Brian Meeks is a  44 y o  female who presents today for blood glucose monitoring during pregnancy  Patient is at [address] [facility] gestation, Estimated Date of Delivery: 11/22/22  Reviewed and updated the following from patients medical record: PMH, Problem List, Allergies, and Current Medications  Visit Diagnosis:  Screening for Gestational diabetes  Reason for meter education: Cannot tolerate the 1 hour Glucola test      Additional Pregnancy Complications:  Obesity    Labs:    No results found for: [id number]    No components found for: HGA1C HbA1c was ordered on 9/1/22; encouraged patient to complete it soon        Medications:  No diabetes related medications    Anthropometrics:  Ht Readings from Last 3 Encounters:   09/30/22 5' (1 524 m)   09/28/22 5' (1 524 m)   09/16/22 5' 1" (1 549 m)     Wt Readings from Last 3 Encounters:   22 96 6 kg (213 lb)   22 97 1 kg (214 lb)   22 97 5 kg (215 lb)     Pre-gravid weight: 94 8 kg (208 lb 15 9 oz)  Pre-gravid BMI: 40 82  Weight Change: 1 816 kg (4 lb 0 1 oz)  Weight gain recommendations: BMI (> 30) 11-20 lbs  Comments: patient may gain 15 more pounds for the remainder of the pregnancy  Recent Ultra Sound Results:  Date: 22  Fetal Growth: AC->97% & EFW-90%  MADISON: Normal  Next US: 22    Blood Glucose Monitoring:   Glucose Meter: Contour Next EZ  Instructed on testing blood sugars: 4 x per day (Fasting, 2 hour after start of each meal) for 1 week to rule-out gestational daibetes     Gave instruction on site selection, skin preparation, loading strips and lancet device, meter activation, obtaining blood sample, test strip and lancet disposal and storage, and recording log book entries  Patient has good understanding of material covered and was able to test their own blood sugar in office today  Instruction for reporting blood sugar results weekly via:  Phone: [phone]   OR  My Chart (Message with image attachment, or Glucose Flowsheet)    Goal Blood Sugar Ranges:   Fastin-90 mg/dL  1 hour after the start of each meal: 140 mg/dL or less  2 hours after start of each meal: 120 mg/dL or less      Date to report blood sugars: In 1 week      Begin Time: 3:15 PM  End Time: 3:42 PM    It was a pleasure working with them today  Please feel free to call with any questions or concerns      Gabo Morales  Diabetes Educator  [facility] Maternal Fetal Medicine  Diabetes in Pregnancy Program  [address] [facility], [address] [place] [address],[address]  [address]

## 2022-10-14 ENCOUNTER — ROUTINE PRENATAL (OUTPATIENT)
Dept: OBGYN CLINIC | Facility: CLINIC | Age: 37
End: 2022-10-14

## 2022-10-14 VITALS
BODY MASS INDEX: 42.41 KG/M2 | SYSTOLIC BLOOD PRESSURE: 121 MMHG | DIASTOLIC BLOOD PRESSURE: 71 MMHG | HEART RATE: 96 BPM | WEIGHT: 216 LBS | HEIGHT: 60 IN

## 2022-10-14 DIAGNOSIS — Z3A.34 34 WEEKS GESTATION OF PREGNANCY: Primary | ICD-10-CM

## 2022-10-14 PROCEDURE — 99213 OFFICE O/P EST LOW 20 MIN: CPT | Performed by: OBSTETRICS & GYNECOLOGY

## 2022-10-14 NOTE — PROGRESS NOTES
OB/GYN  PN Visit  Abdirashid Zamora  [id number]  10/16/2022  9:45 PM  Dr Sara Waddell    S: 44 y o  [id number] 34w3d here for PN visit  OB complaints:  Ctxns: no  Leakage: no  Bleeding: no  Fetal movement: yes    She reports no symptomatic concerns - tiredness and soreness       O:  Vitals:    10/14/22 1434   BP: 121/71   Pulse: 96       Gen: no acute distress, nonlabored breathing, gravid abdomen  OB exam completed: fundal height - 34, FHTs - 140    A/P:  #1  34w3d GESTATION  Labor precautions reviewed  Fetal kick counts reviewed  She will be submitting the blood sugars that she is completing for diabetes screening  Reminded to complete prenatal panel and outstanding lab orders  Patient declines Flu vaccine  Tdap given   Delivery consent is signed      Initiation of weekly antepartum surveillance at 37 weeks based on pre-gravid BMI  Brest pump is ordered  RTC in 2 weeks      Future Appointments   Date Time Provider Rito Camacho   10/18/2022  8:00 AM DO [facility]   10/27/2022  2:00 PM MD Nita Granados [address] [facility] [address]   2022  9:30 AM   [facility] [address]   2022  9:30 AM   [facility] [address]   2022 11:00 AM   [facility]   2022 11:00 AM  US Reginald Cazares  10/16/2022  9:45 PM

## 2022-10-16 PROBLEM — Z3A.34 34 WEEKS GESTATION OF PREGNANCY: Status: ACTIVE | Noted: 2022-04-21

## 2022-10-20 LAB
DME PARACHUTE DELIVERY DATE ACTUAL: NORMAL
DME PARACHUTE DELIVERY DATE EXPECTED: NORMAL
DME PARACHUTE DELIVERY DATE REQUESTED: NORMAL
DME PARACHUTE ITEM DESCRIPTION: NORMAL
DME PARACHUTE ORDER STATUS: NORMAL
DME PARACHUTE SUPPLIER NAME: NORMAL
DME PARACHUTE SUPPLIER PHONE: NORMAL

## 2022-10-25 PROBLEM — Z3A.36 36 WEEKS GESTATION OF PREGNANCY: Status: ACTIVE | Noted: 2022-04-21

## 2022-10-25 NOTE — PROGRESS NOTES
OB/GYN prenatal visit    S: 44 y o  [id number] 36w2d here for PN visit  She has no obstetric complaints, including pelvic pain, contractions, vaginal bleeding, loss of fluid, or decreased fetal movement  O:  There were no vitals filed for this visit  Gen: no acute distress, nonlabored breathing     Fetal Heart Rate: 130   TAUS: Vertex  SVE: 50/-3, posterior, soft    A/P:      IUP at 36w2d  No obstetric complaints today  TWG: + 7 (recommended weight gain 11-20lbs)  Flu vaccine declined  Contraception: sterilization  Discussed  labor precautions and fetal kick counts    Return to clinic in 1 weeks  GBS collected, discussed  Discussed outstanding labs that patient has yet to complete  Cbc, CMP, A1c  Patient will complete  She has completed her fingerstick monitoring and plans to provide information to  center when she sees them next week    COVID 19 precautions were discussed with patient at length, reviewed symptoms, hygiene, social distancing, patient to call office  with questions/concerns    Discussed with Dr Sabrina Rojas  10/25/2022  11:41 AM    Problem List        Cardiovascular and Mediastinum    Migraine    Overview     Takes imitrex and propranolol when not pregnant, asymptomatic during pregnancy            Other    36 weeks gestation of pregnancy    Overview     Pre-pregnancy BMI 44, weight gain 11-20lbs   Daughter with vWD, patient is carrier  Pap/HPV NILM, HPV neg   Treated for UTI early in pregnancy with negative test of cure  A positive  Last ultrasound 22: breech presentation, possible macrosomia with AC >97%, posterior/fundal placenta   Plan repeat fetal growth evaluation at 37 weeks (22)         AMA (advanced maternal age) multigravida 35+    Overview     NIPT with negative screening, low risk         Breech presentation    Fetal macrosomia    History of  delivery, currently pregnant    Overview     3 spontaneous  deliveries, earliest at 22 weeks  35 weeks    S/p cervical length surveillance through MFM         Obesity affecting pregnancy    Overview     Recommended weight gain 11-20lbs  APFS starting at 37 weeks due to pre-gravid BMI         Prenatal care in third trimester    Request for sterilization    Overview     Desires permanent sterilization  MA-31 9/1/22

## 2022-10-27 ENCOUNTER — ROUTINE PRENATAL (OUTPATIENT)
Dept: OBGYN CLINIC | Facility: CLINIC | Age: 37
End: 2022-10-27

## 2022-10-27 VITALS
BODY MASS INDEX: 42.41 KG/M2 | HEART RATE: 103 BPM | WEIGHT: 216 LBS | DIASTOLIC BLOOD PRESSURE: 79 MMHG | HEIGHT: 60 IN | SYSTOLIC BLOOD PRESSURE: 115 MMHG

## 2022-10-27 DIAGNOSIS — Z30.2 REQUEST FOR STERILIZATION: ICD-10-CM

## 2022-10-27 DIAGNOSIS — O09.523 MULTIGRAVIDA OF ADVANCED MATERNAL AGE IN THIRD TRIMESTER: ICD-10-CM

## 2022-10-27 DIAGNOSIS — Z34.93 PRENATAL CARE IN THIRD TRIMESTER: Primary | ICD-10-CM

## 2022-10-27 DIAGNOSIS — Z3A.36 36 WEEKS GESTATION OF PREGNANCY: ICD-10-CM

## 2022-10-27 DIAGNOSIS — O09.899 HISTORY OF PRETERM DELIVERY, CURRENTLY PREGNANT: ICD-10-CM

## 2022-10-27 PROCEDURE — 87150 DNA/RNA AMPLIFIED PROBE: CPT | Performed by: OBSTETRICS & GYNECOLOGY

## 2022-10-28 NOTE — PATIENT INSTRUCTIONS
Thank you for choosing us for your  care today  If you have any questions about your ultrasound or care, please do not hesitate to contact us or your primary obstetrician  Some general instructions for your pregnancy are:    Protect against coronavirus: get vaccinated - pregnant women are increased risk of severe COVID  Notify your primary care doctor if you have any symptoms  Exercise: Aim for 22 minutes per day (150 minutes per week) of regular exercise  Walking is great! Nutrition: aim for calcium-rich and iron-rich foods as well as healthy sources of protein  Learn about Preeclampsia: preeclampsia is a common, serious high blood pressure complication in pregnancy  A blood pressure of 055CBDN (systolic or top number) or 87OUWZ (diastolic or bottom number) is not normal and needs evaluation by your doctor  Aspirin is sometimes prescribed in early pregnancy to prevent preeclampsia in women with risk factors - ask your obstetrician if you should be on this medication  If you smoke, try to reduce how many cigarettes you smoke or try to quit completely  Do not vape  Other warning signs to watch out for in pregnancy or postpartum: chest pain, obstructed breathing or shortness of breath, seizures, thoughts of hurting yourself or your baby, bleeding, a painful or swollen leg, fever, or headache (see [employer] POST-BIRTH Warning Signs campaign)  If these happen call 911  Itching is also not normal in pregnancy and if you experience this, especially over your hands and feet, potentially worse at night, notify your doctors

## 2022-10-29 LAB — GP B STREP DNA SPEC QL NAA+PROBE: NEGATIVE

## 2022-11-01 ENCOUNTER — ULTRASOUND (OUTPATIENT)
Dept: PERINATAL CARE | Facility: CLINIC | Age: 37
End: 2022-11-01

## 2022-11-01 VITALS
HEIGHT: 60 IN | SYSTOLIC BLOOD PRESSURE: 110 MMHG | BODY MASS INDEX: 43.15 KG/M2 | DIASTOLIC BLOOD PRESSURE: 64 MMHG | WEIGHT: 219.8 LBS | HEART RATE: 88 BPM

## 2022-11-01 DIAGNOSIS — O99.213 OBESITY AFFECTING PREGNANCY IN THIRD TRIMESTER: Primary | ICD-10-CM

## 2022-11-01 DIAGNOSIS — Z3A.36 36 WEEKS GESTATION OF PREGNANCY: ICD-10-CM

## 2022-11-01 NOTE — LETTER
NST sleeve cover sheet    Patient name: Denice Hay  : 1985  MRN: [id number]    RIKY: Estimated Date of Delivery: 22    Obstetrician: Koki     Reason(s) for testing: Pregravid B<I   __________________________________________      Testing frequency:    ___ 2x/wk  _X__ 1x/wk  ___ Dopplers  ___ BPP?       Last growth scan: __________________________________________

## 2022-11-01 NOTE — PROGRESS NOTES
Non-Stress Testing:    Non-Stress test, equipment, procedure, and expected outcomes reviewed  Reviewed fetal kick counts and when to call OB  Leighann Castanon Verified patient understanding of fetal kick counts with teach back method  Patient reports feeling daily fetal movements  Patient has no questions or concerns              Alphonso KRAMER viewed NST strip prior to completion of visit

## 2022-11-04 ENCOUNTER — ROUTINE PRENATAL (OUTPATIENT)
Dept: OBGYN CLINIC | Facility: CLINIC | Age: 37
End: 2022-11-04

## 2022-11-04 VITALS
HEART RATE: 105 BPM | BODY MASS INDEX: 43.39 KG/M2 | SYSTOLIC BLOOD PRESSURE: 122 MMHG | DIASTOLIC BLOOD PRESSURE: 63 MMHG | HEIGHT: 60 IN | WEIGHT: 221 LBS

## 2022-11-04 DIAGNOSIS — Z3A.37 37 WEEKS GESTATION OF PREGNANCY: ICD-10-CM

## 2022-11-04 DIAGNOSIS — O09.33 INSUFFICIENT PRENATAL CARE IN THIRD TRIMESTER: Primary | ICD-10-CM

## 2022-11-04 NOTE — PROGRESS NOTES
Missy Stephens presents today for routine OB visit at 37w3d  BP: 344/91394/69  Weight: 100 kg (221 lb)  BMI:   40 82    IUP at 91c7mmbe  No obstetric complaints today  Flu vaccine declined  Contraception: sterilization  She reports feeling well  Vaginal Bleeding: none  Leaking of Fluid: none  Fetal Movement: reports adequate movement of at least 10 movements in 2 hours once daily  yes  Uterine Contractions/Persistent Cramping: mild cramping  Third Trimester Bloodwork: strep B negative  Vaccinations: Tdap on 2022,   Scheduled for Next Ultrasound: 2022     - Discussed  labor precautions and fetal kick counts    - Return to clinic in 1 week  - GBS negative, discussed      Physical Exam:  No acute distress, non-labored breathing  Fetal Heart Rate: 140s  Fundal Height: 40      Current Outpatient Medications:   •  aspirin (ECOTRIN LOW STRENGTH) 81 mg EC tablet, Take 2 tablets (162 mg total) by mouth in the morning  Stop at 36 weeks gestation   (Patient not taking: Reported on 2022), Disp: 60 tablet, Rfl: 5  •  Blood Glucose Monitoring Suppl (Contour Next EZ) w/Device KIT, Test 4 times daily for 1 week , Disp: 1 kit, Rfl: 0  •  Contour Next Test test strip, Test 4 times daily for 1 week , Disp: 50 strip, Rfl: 0  •  folic acid (FOLVITE) 1 mg tablet, Take 400 mcg by mouth daily, Disp: , Rfl:   •  Microlet Lancets MISC, Test 4 times daily for 1 week , Disp: 100 each, Rfl: 0  •  Prenatal MV-Min-Fe Fum-FA-DHA (PRENATAL 1 PO), Take 1 tablet by mouth in the morning, Disp: , Rfl:   •  Progesterone 200 MG CAPS, Insert 200 mg into the vagina at bedtime (Patient not taking: No sig reported), Disp: 30 capsule, Rfl: 5    Pregnancy Problems (from 02/15/22 to present)     Problem Noted Resolved    36 weeks gestation of pregnancy 9408 by Gayathri Amaya MD No    Overview Addendum 10/25/2022 11:39 AM by Constance Bright MD     Pre-pregnancy BMI 39, weight gain 11-20lbs   Daughter with vWD, patient is carrier  Pap/HPV NILM, HPV neg 5/22  Treated for UTI early in pregnancy with negative test of cure  A positive  Last ultrasound 9/28/22: breech presentation, possible macrosomia with AC >97%, posterior/fundal placenta   Plan repeat fetal growth evaluation at 37 weeks (11/1/22)         Previous Version          Adriana Guadarrama MD  11/4/2022  2:32 PM

## 2022-11-09 ENCOUNTER — ULTRASOUND (OUTPATIENT)
Dept: PERINATAL CARE | Facility: CLINIC | Age: 37
End: 2022-11-09

## 2022-11-09 VITALS
HEIGHT: 60 IN | HEART RATE: 88 BPM | DIASTOLIC BLOOD PRESSURE: 62 MMHG | BODY MASS INDEX: 43.07 KG/M2 | SYSTOLIC BLOOD PRESSURE: 102 MMHG | WEIGHT: 219.4 LBS

## 2022-11-09 DIAGNOSIS — O09.523 MULTIGRAVIDA OF ADVANCED MATERNAL AGE IN THIRD TRIMESTER: ICD-10-CM

## 2022-11-09 DIAGNOSIS — O99.213 OBESITY AFFECTING PREGNANCY IN THIRD TRIMESTER: ICD-10-CM

## 2022-11-09 DIAGNOSIS — Z3A.38 38 WEEKS GESTATION OF PREGNANCY: Primary | ICD-10-CM

## 2022-11-09 NOTE — PROGRESS NOTES
Repeat Non-Stress Testing:    Patient verbalizes +FM  Pt denies ALL:               Leaking of fluid   Contractions   Vaginal bleeding   Decreased fetal movement    Patient is performing daily kick counts  Patient has no questions or concerns     NST strip reviewed by Dr Rodriguez Simmons

## 2022-11-09 NOTE — LETTER
November 9, 5784     Stefany Bryan MD    Patient: Gabby Jain   YOB: 1985   Date of Visit: 11/9/2022       Dear Dr Anju Vazquez:    Thank you for referring Maile Camilo to me for evaluation  Below are my notes for this consultation  If you have questions, please do not hesitate to call me  I look forward to following your patient along with you  Sincerely,        Roxanna Cherry RN        CC: No Recipients  Hari Oscar MD  11/9/2022 10:53 AM  Sign when Signing Visit  A fetal ultrasound and NST were completed  See Ob procedures in Epic for an interpretation and recommendations  Do not hesitate to contact us in [facility] if you have questions  Juana Simmons MD, [address] [facility]  Maternal Fetal Medicine

## 2022-11-09 NOTE — PROGRESS NOTES
A fetal ultrasound and NST were completed  See Ob procedures in Epic for an interpretation and recommendations  Do not hesitate to contact us in [facility] if you have questions  Proctavianoy Raine Hernandez MD, [address] [facility]  Maternal Fetal Medicine

## 2022-11-11 ENCOUNTER — ROUTINE PRENATAL (OUTPATIENT)
Dept: OBGYN CLINIC | Facility: CLINIC | Age: 37
End: 2022-11-11

## 2022-11-11 VITALS
WEIGHT: 221 LBS | HEART RATE: 108 BPM | HEIGHT: 60 IN | SYSTOLIC BLOOD PRESSURE: 124 MMHG | RESPIRATION RATE: 18 BRPM | BODY MASS INDEX: 43.39 KG/M2 | DIASTOLIC BLOOD PRESSURE: 68 MMHG

## 2022-11-11 DIAGNOSIS — O09.899 HISTORY OF PRETERM DELIVERY, CURRENTLY PREGNANT: ICD-10-CM

## 2022-11-11 DIAGNOSIS — O09.523 MULTIGRAVIDA OF ADVANCED MATERNAL AGE IN THIRD TRIMESTER: ICD-10-CM

## 2022-11-11 DIAGNOSIS — Z3A.38 38 WEEKS GESTATION OF PREGNANCY: Primary | ICD-10-CM

## 2022-11-11 DIAGNOSIS — O99.213 OBESITY AFFECTING PREGNANCY IN THIRD TRIMESTER: ICD-10-CM

## 2022-11-11 NOTE — PROGRESS NOTES
OB/GYN  PN Visit  Julissa Bernard  [id number]  2022  3:18 PM  Dr Julia Julien MD    S: 44 y o  [id number] 38w3d here for PN visit  OB complaints:  Ctxns: sporadic cramping  Leakage: no  Bleeding: no  Fetal movement: yes    She reports she has been checking her blood sugars  The highest was 173 after eating [employer] but they have mostly been under 120  She complains of back pain as well as pressure/soreness of her inner thighs  She requests to be induced  O:  Vitals:    22 1435   BP: 124/68   Pulse: (!) 108   Resp: 18       Gen: no acute distress, nonlabored breathing  OB exam completed: fundal height 40 cm, FHTs 137 bpm, cervix: 3/50/-3    A/P:  38w3d GESTATION   Declined flu shot  Tdap given 22  Encouraged patient to have blood work completed  Last US 22 with no further f/u needed    Labor precautions reviewed  Scheduled for eIOL at 39 weeks on 11/15/22 at 3:00 PM  HELEN signed 22 for planned sterilization      Future Appointments   Date Time Provider Rito Camacho   2022 11:00 AM   [address]   2022 11:00 AM   [address]       Julia Julien MD  2022  3:18 PM

## 2022-11-15 ENCOUNTER — ANESTHESIA (INPATIENT)
Dept: ANESTHESIOLOGY | Facility: HOSPITAL | Age: 37
End: 2022-11-15

## 2022-11-15 ENCOUNTER — HOSPITAL ENCOUNTER (INPATIENT)
Facility: HOSPITAL | Age: 37
LOS: 2 days | Discharge: HOME | End: 2022-11-17
Attending: OBSTETRICS & GYNECOLOGY | Admitting: OBSTETRICS & GYNECOLOGY

## 2022-11-15 ENCOUNTER — ANESTHESIA EVENT (INPATIENT)
Dept: ANESTHESIOLOGY | Facility: HOSPITAL | Age: 37
End: 2022-11-15

## 2022-11-15 ENCOUNTER — HOSPITAL ENCOUNTER (OUTPATIENT)
Dept: LABOR AND DELIVERY | Facility: HOSPITAL | Age: 37
Discharge: HOME | End: 2022-11-15

## 2022-11-15 DIAGNOSIS — Z3A.38 38 WEEKS GESTATION OF PREGNANCY: Primary | ICD-10-CM

## 2022-11-15 PROBLEM — Z3A.39 39 WEEKS GESTATION OF PREGNANCY: Status: ACTIVE | Noted: 2022-04-21

## 2022-11-15 LAB
ABO GROUP BLD: NORMAL
ALBUMIN SERPL BCP-MCNC: 3.6 G/DL (ref 3.5–5)
ALP SERPL-CCNC: 114 U/L (ref 34–104)
ALT SERPL W P-5'-P-CCNC: 9 U/L (ref 7–52)
ANION GAP SERPL CALCULATED.3IONS-SCNC: 8 MMOL/L (ref 4–13)
AST SERPL W P-5'-P-CCNC: 14 U/L (ref 13–39)
BASOPHILS # BLD AUTO: 0.03 THOUSANDS/ÂΜL (ref 0–0.1)
BASOPHILS NFR BLD AUTO: 0 % (ref 0–1)
BILIRUB SERPL-MCNC: 0.44 MG/DL (ref 0.2–1)
BLD GP AB SCN SERPL QL: NEGATIVE
BUN SERPL-MCNC: 10 MG/DL (ref 5–25)
CALCIUM SERPL-MCNC: 8.7 MG/DL (ref 8.4–10.2)
CHLORIDE SERPL-SCNC: 106 MMOL/L (ref 96–108)
CO2 SERPL-SCNC: 21 MMOL/L (ref 21–32)
CREAT SERPL-MCNC: 0.45 MG/DL (ref 0.6–1.3)
EOSINOPHIL # BLD AUTO: 0.06 THOUSAND/ÂΜL (ref 0–0.61)
EOSINOPHIL NFR BLD AUTO: 1 % (ref 0–6)
ERYTHROCYTE [DISTWIDTH] IN BLOOD BY AUTOMATED COUNT: 14.3 % (ref 11.6–15.1)
GFR SERPL CREATININE-BSD FRML MDRD: 127 ML/MIN/1.73SQ M
GLUCOSE SERPL-MCNC: 79 MG/DL (ref 65–140)
GLUCOSE SERPL-MCNC: 80 MG/DL (ref 65–140)
GLUCOSE SERPL-MCNC: 83 MG/DL (ref 65–140)
GLUCOSE SERPL-MCNC: 83 MG/DL (ref 65–140)
GLUCOSE SERPL-MCNC: 85 MG/DL (ref 65–140)
HCT VFR BLD AUTO: 32.4 % (ref 34.8–46.1)
HGB BLD-MCNC: 10.6 G/DL (ref 11.5–15.4)
IMM GRANULOCYTES # BLD AUTO: 0.05 THOUSAND/UL (ref 0–0.2)
IMM GRANULOCYTES NFR BLD AUTO: 1 % (ref 0–2)
LYMPHOCYTES # BLD AUTO: 1.64 THOUSANDS/ÂΜL (ref 0.6–4.47)
LYMPHOCYTES NFR BLD AUTO: 19 % (ref 14–44)
MCH RBC QN AUTO: 30 PG (ref 26.8–34.3)
MCHC RBC AUTO-ENTMCNC: 32.7 G/DL (ref 31.4–37.4)
MCV RBC AUTO: 92 FL (ref 82–98)
MONOCYTES # BLD AUTO: 0.64 THOUSAND/ÂΜL (ref 0.17–1.22)
MONOCYTES NFR BLD AUTO: 8 % (ref 4–12)
NEUTROPHILS # BLD AUTO: 6.1 THOUSANDS/ÂΜL (ref 1.85–7.62)
NEUTS SEG NFR BLD AUTO: 71 % (ref 43–75)
NRBC BLD AUTO-RTO: 0 /100 WBCS
PLATELET # BLD AUTO: 233 THOUSANDS/UL (ref 149–390)
PMV BLD AUTO: 10.8 FL (ref 8.9–12.7)
POTASSIUM SERPL-SCNC: 4 MMOL/L (ref 3.5–5.3)
PROT SERPL-MCNC: 6.8 G/DL (ref 6.4–8.4)
RBC # BLD AUTO: 3.53 MILLION/UL (ref 3.81–5.12)
RH BLD: POSITIVE
SODIUM SERPL-SCNC: 135 MMOL/L (ref 135–147)
SPECIMEN EXPIRATION DATE: NORMAL
WBC # BLD AUTO: 8.52 THOUSAND/UL (ref 4.31–10.16)

## 2022-11-15 PROCEDURE — 4A1HXCZ MONITORING OF PRODUCTS OF CONCEPTION, CARDIAC RATE, EXTERNAL APPROACH: ICD-10-PCS | Performed by: OBSTETRICS & GYNECOLOGY

## 2022-11-15 PROCEDURE — 0UB70ZZ EXCISION OF BILATERAL FALLOPIAN TUBES, OPEN APPROACH: ICD-10-PCS | Performed by: OBSTETRICS & GYNECOLOGY

## 2022-11-15 RX ORDER — SODIUM CHLORIDE, SODIUM LACTATE, POTASSIUM CHLORIDE, CALCIUM CHLORIDE 600; 310; 30; 20 MG/100ML; MG/100ML; MG/100ML; MG/100ML
125 INJECTION, SOLUTION INTRAVENOUS CONTINUOUS
Status: DISCONTINUED | OUTPATIENT
Start: 2022-11-15 | End: 2022-11-17 | Stop reason: HOSPADM

## 2022-11-15 RX ORDER — OXYTOCIN/RINGER'S LACTATE 30/500 ML
1-30 PLASTIC BAG, INJECTION (ML) INTRAVENOUS
Status: DISCONTINUED | OUTPATIENT
Start: 2022-11-15 | End: 2022-11-17 | Stop reason: HOSPADM

## 2022-11-15 RX ORDER — ONDANSETRON 2 MG/ML
4 INJECTION INTRAMUSCULAR; INTRAVENOUS EVERY 6 HOURS PRN
Status: DISCONTINUED | OUTPATIENT
Start: 2022-11-15 | End: 2022-11-16

## 2022-11-15 RX ADMIN — ROPIVACAINE HYDROCHLORIDE: 2 INJECTION, SOLUTION EPIDURAL; INFILTRATION at 20:45

## 2022-11-15 RX ADMIN — Medication 2 MILLI-UNITS/MIN: at 17:17

## 2022-11-15 RX ADMIN — SODIUM CHLORIDE, SODIUM LACTATE, POTASSIUM CHLORIDE, AND CALCIUM CHLORIDE 999 ML/HR: .6; .31; .03; .02 INJECTION, SOLUTION INTRAVENOUS at 20:57

## 2022-11-15 RX ADMIN — SODIUM CHLORIDE, SODIUM LACTATE, POTASSIUM CHLORIDE, AND CALCIUM CHLORIDE 125 ML/HR: .6; .31; .03; .02 INJECTION, SOLUTION INTRAVENOUS at 17:07

## 2022-11-15 NOTE — H&P
[address] A Nena 44 y o  female MRN: [id number]  Unit/Bed#:  205-01 Encounter: [id number]    Assessment: 44 y o  [id number] at 39w0d admitted for elective induction of labor  SVE: 3/50/-3  FHT: 150  Clinical EFW: 65% ; Cephalic confirmed by TAUS  GBS status: neg   Postpartum contraception plan: Tubal     Plan:   * 39 weeks gestation of pregnancy  Assessment & Plan  Admit to OBGYN   Clear liquid diet   F/u T&S, CBC, RPR   IVF LR 125cc/hr   Continuous fetal monitoring and tocometry   Analgesia at maternal request   Induction plan: pitocin   GTT never completed - A1GDM glucose monitoring          Discussed case and plan w/ Dr Lilliana Varma and Dr Constantino Conley      Chief Complaint: none    HPI: Christiano Luis is a 44 y o  [id number] with an RIKY of 2022, by Last Menstrual Period at 39w0d who is being admitted for induction of labor  She denies having uterine contractions, has no LOF, and reports no VB  She states she has felt good FM  Patient Active Problem List   Diagnosis   • Request for sterilization   • Migraine   • 39 weeks gestation of pregnancy   • AMA (advanced maternal age) multigravida 35+   • Obesity affecting pregnancy   • History of  delivery, currently pregnant   • Prenatal care in third trimester   • Fetal macrosomia       Baby complications/comments: none    Review of Systems   Constitutional: Negative for chills and fever  Eyes: Negative for visual disturbance  Respiratory: Negative for shortness of breath  Cardiovascular: Negative for chest pain  Gastrointestinal: Negative for diarrhea, nausea and vomiting  Genitourinary: Negative for dysuria, flank pain, hematuria, vaginal bleeding and vaginal discharge  Skin: Negative for rash  Neurological: Negative for dizziness, numbness and headaches  All other systems reviewed and are negative        OB Hx:  OB History    Para Term  AB Living   12 5 3 2 6 4   SAB IAB Ectopic Multiple Live Births   4 2 0 0 4      # Outcome Date GA Lbr Juan/2nd Weight Sex Delivery Anes PTL Lv   12 Current            11 Term 20 37w2d / 12:12 2995 g (6 lb 9 6 oz) M Vag-Spont EPI N GILL   10  17 22w0d   M  None Y FD   9 Term 03/27/15    M Vag-Spont EPI  GILL   8 Term 05    F Vag-Spont None  GILL   7  10/18/02 35w0d   F Vag-Spont None  GILL   6 SAB            5 SAB            4 SAB            3 SAB            2 IAB            1 IAB                Past Medical Hx:  Past Medical History:   Diagnosis Date   • Gallbladder attack    • Kidney stones    • Migraines    • Varicella    • Von Willebrand disease     patient is carrier       Past Surgical hx:  Past Surgical History:   Procedure Laterality Date   • LAPAROSCOPIC CHOLECYSTECTOMY     • LITHOTRIPSY     • TONSILLECTOMY         Social Hx:  Alcohol use: denies  Tobacco use: denies  Other substance use: denies    Allergies   Allergen Reactions   • Latex Anaphylaxis, Hives and Anxiety     Category: Allergy; Annotation - [id number]: HIVES       Medications Prior to Admission   Medication   • Blood Glucose Monitoring Suppl (Contour Next EZ) w/Device KIT   • Contour Next Test test strip   • folic acid (FOLVITE) 1 mg tablet   • Microlet Lancets MISC   • Prenatal MV-Min-Fe Fum-FA-DHA (PRENATAL 1 PO)   • aspirin (ECOTRIN LOW STRENGTH) 81 mg EC tablet   • Progesterone 200 MG CAPS       Objective:  Temp:  [98 2 °F (36 8 °C)] 98 2 °F (36 8 °C)  HR:  [91-95] 93  Resp:  [18] 18  BP: (112-124)/(56-76) 123/72  Body mass index is 43 16 kg/m²  Physical Exam:  Physical Exam  Constitutional:       General: She is not in acute distress  Appearance: Normal appearance  HENT:      Head: Normocephalic and atraumatic  Cardiovascular:      Rate and Rhythm: Normal rate  Pulses: Normal pulses  Pulmonary:      Effort: Pulmonary effort is normal  No respiratory distress  Abdominal:      Tenderness: There is no abdominal tenderness  There is no guarding        Comments: gravid Musculoskeletal:         General: Normal range of motion  Cervical back: Normal range of motion  Neurological:      General: No focal deficit present  Mental Status: She is alert  Mental status is at baseline  Skin:     General: Skin is warm and dry  Psychiatric:         Mood and Affect: Mood normal          Behavior: Behavior normal    Vitals reviewed              FHT:  Baseline Rate: 135 bpm  Variability: Moderate 6-25 bpm  Accelerations: 15 x 15 or greater  Decelerations: None  FHR Category: Category I    TOCO:   Contraction Frequency (minutes): irritatbility  Contraction Duration (seconds): 60-80  Contraction Quality: Mild    Lab Results   Component Value Date    WBC 8 52 11/15/2022    HGB 10 6 (L) 11/15/2022    HCT 32 4 (L) 11/15/2022     11/15/2022     Lab Results   Component Value Date    K 4 0 11/15/2022     11/15/2022    CO2 21 11/15/2022    BUN 10 11/15/2022    CREATININE 0 45 (L) 11/15/2022    AST 14 11/15/2022    ALT 9 11/15/2022     Prenatal Labs: Reviewed      Blood type: A pos  Antibody: neg  GBS: neg  HIV: nonreactive  Rubella: Immune  VDRL/RPR: Non reactive  HBsAg: Negative  Chlamydia: Negative  Gonorrhea: Negative  Diabetes 1 hour screen: not completed  3 hour glucose: not completed  Platelets: 184  Hgb: 10 6  >2 Midnights  INPATIENT     Signature/Title: Payam Rodriguez MD  Date: 11/15/2022  Time: 5:50 PM

## 2022-11-15 NOTE — ASSESSMENT & PLAN NOTE
Admit to OBGYN   Clear liquid diet   F/u T&S, CBC, RPR   IVF LR 125cc/hr   Continuous fetal monitoring and tocometry   Analgesia at maternal request   Induction plan: pitocin   GTT never completed - A1GDM glucose monitoring

## 2022-11-15 NOTE — PLAN OF CARE
Problem: PAIN - ADULT  Goal: Verbalizes/displays adequate comfort level or baseline comfort level  Description: Interventions:  - Encourage patient to monitor pain and request assistance  - Assess pain using appropriate pain scale  - Administer analgesics based on type and severity of pain and evaluate response  - Implement non-pharmacological measures as appropriate and evaluate response  - Consider cultural and social influences on pain and pain management  - Notify physician/advanced practitioner if interventions unsuccessful or patient reports new pain  Outcome: Progressing     Problem: INFECTION - ADULT  Goal: Absence or prevention of progression during hospitalization  Description: INTERVENTIONS:  - Assess and monitor for signs and symptoms of infection  - Monitor lab/diagnostic results  - Monitor all insertion sites, i e  indwelling lines, tubes, and drains  - Monitor endotracheal if appropriate and nasal secretions for changes in amount and color  - [place] appropriate cooling/warming therapies per order  - Administer medications as ordered  - Instruct and encourage patient and family to use good hand hygiene technique  - Identify and instruct in appropriate isolation precautions for identified infection/condition  Outcome: Progressing  Goal: Absence of fever/infection during neutropenic period  Description: INTERVENTIONS:  - Monitor WBC    Outcome: Progressing     Problem: SAFETY ADULT  Goal: Patient will remain free of falls  Description: INTERVENTIONS:  - Educate patient/family on patient safety including physical limitations  - Instruct patient to call for assistance with activity   - Consult OT/PT to assist with strengthening/mobility   - Keep Call bell within reach  - Keep bed low and locked with side rails adjusted as appropriate  - Keep care items and personal belongings within reach  - Initiate and maintain comfort rounds  - Make Fall Risk Sign visible to staff  - Offer Toileting in advance of need  - Initiate/Maintain alarm  - Obtain necessary fall risk management equipment:   - Apply yellow socks and bracelet for high fall risk patients  - Consider moving patient to room near nurses station  Outcome: Progressing  Goal: Maintain or return to baseline ADL function  Description: INTERVENTIONS:  -  Assess patient's ability to carry out ADLs; assess patient's baseline for ADL function and identify physical deficits which impact ability to perform ADLs (bathing, care of mouth/teeth, toileting, grooming, dressing, etc )  - Assess/evaluate cause of self-care deficits   - Assess range of motion  - Assess patient's mobility; develop plan if impaired  - Assess patient's need for assistive devices and provide as appropriate  - Encourage maximum independence but intervene and supervise when necessary  - Involve family in performance of ADLs  - Assess for home care needs following discharge   - Consider OT consult to assist with ADL evaluation and planning for discharge  - Provide patient education as appropriate  Outcome: Progressing  Goal: Maintains/Returns to pre admission functional level  Description: INTERVENTIONS:  - Perform BMAT or MOVE assessment daily    - Set and communicate daily mobility goal to care team and patient/family/caregiver  - Collaborate with rehabilitation services on mobility goals if consulted  - Out of bed for toileting  - Record patient progress and toleration of activity level   Outcome: Progressing     Problem: Knowledge Deficit  Goal: Patient/family/caregiver demonstrates understanding of disease process, treatment plan, medications, and discharge instructions  Description: Complete learning assessment and assess knowledge base    Interventions:  - Provide teaching at level of understanding  - Provide teaching via preferred learning methods  Outcome: Progressing     Problem: DISCHARGE PLANNING  Goal: Discharge to home or other facility with appropriate resources  Description: INTERVENTIONS:  - Identify barriers to discharge w/patient and caregiver  - Arrange for needed discharge resources and transportation as appropriate  - Identify discharge learning needs (meds, wound care, etc )  - Arrange for interpretive services to assist at discharge as needed  - Refer to Case Management Department for coordinating discharge planning if the patient needs post-hospital services based on physician/advanced practitioner order or complex needs related to functional status, cognitive ability, or social support system  Outcome: Progressing     Problem: ANTEPARTUM  Goal: Maintain pregnancy as long as maternal and/or fetal condition is stable  Description: INTERVENTIONS:  - Maternal surveillance  - Fetal surveillance  - Monitor uterine activity  - Medications as ordered  - Bedrest  Outcome: Progressing     Problem: BIRTH - VAGINAL/ SECTION  Goal: Fetal and maternal status remain reassuring during the birth process  Description: INTERVENTIONS:  - Monitor vital signs  - Monitor fetal heart rate  - Monitor uterine activity  - Monitor labor progression (vaginal delivery)  - DVT prophylaxis  - Antibiotic prophylaxis  Outcome: Progressing  Goal: Emotionally satisfying birthing experience for mother/fetus  Description: Interventions:  - Assess, plan, implement and evaluate the nursing care given to the patient in labor  - Advocate the philosophy that each childbirth experience is a unique experience and support the family's chosen level of involvement and control during the labor process   - Actively participate in both the patient's and family's teaching of the birth process  - Consider cultural, Voodoo and age-specific factors and plan care for the patient in labor  Outcome: Progressing

## 2022-11-16 LAB
BASE EXCESS BLDCOA CALC-SCNC: -1.6 MMOL/L (ref 3–11)
BASE EXCESS BLDCOV CALC-SCNC: -1.3 MMOL/L (ref 1–9)
EST. AVERAGE GLUCOSE BLD GHB EST-MCNC: 105 MG/DL
GLUCOSE SERPL-MCNC: 92 MG/DL (ref 65–140)
HBA1C MFR BLD: 5.3 %
HCO3 BLDCOA-SCNC: 24.4 MMOL/L (ref 17.3–27.3)
HCO3 BLDCOV-SCNC: 23.1 MMOL/L (ref 12.2–28.6)
O2 CT VFR BLDCOA CALC: 11.5 ML/DL
OXYHGB MFR BLDCOA: 50.3 %
OXYHGB MFR BLDCOV: 90.3 %
PCO2 BLDCOA: 45.7 MM[HG] (ref 30–60)
PCO2 BLDCOV: 38.1 MM HG (ref 27–43)
PH BLDCOA: 7.35 [PH] (ref 7.23–7.43)
PH BLDCOV: 7.4 [PH] (ref 7.19–7.49)
PO2 BLDCOA: 21.8 MM HG (ref 5–25)
PO2 BLDCOV: 47.1 MM HG (ref 15–45)
RPR SER QL: NORMAL
SAO2 % BLDCOV: 21.1 ML/DL

## 2022-11-16 RX ORDER — DIAPER,BRIEF,INFANT-TODD,DISP
1 EACH MISCELLANEOUS DAILY PRN
Status: DISCONTINUED | OUTPATIENT
Start: 2022-11-16 | End: 2022-11-17 | Stop reason: HOSPADM

## 2022-11-16 RX ORDER — DOCUSATE SODIUM 100 MG/1
100 CAPSULE, LIQUID FILLED ORAL 2 TIMES DAILY
Status: DISCONTINUED | OUTPATIENT
Start: 2022-11-16 | End: 2022-11-17 | Stop reason: HOSPADM

## 2022-11-16 RX ORDER — IBUPROFEN 600 MG/1
600 TABLET ORAL EVERY 6 HOURS
Status: DISCONTINUED | OUTPATIENT
Start: 2022-11-16 | End: 2022-11-17 | Stop reason: HOSPADM

## 2022-11-16 RX ORDER — CALCIUM CARBONATE 200(500)MG
1000 TABLET,CHEWABLE ORAL 3 TIMES DAILY PRN
Status: DISCONTINUED | OUTPATIENT
Start: 2022-11-16 | End: 2022-11-17 | Stop reason: HOSPADM

## 2022-11-16 RX ORDER — ONDANSETRON 2 MG/ML
4 INJECTION INTRAMUSCULAR; INTRAVENOUS EVERY 8 HOURS PRN
Status: DISCONTINUED | OUTPATIENT
Start: 2022-11-16 | End: 2022-11-17 | Stop reason: HOSPADM

## 2022-11-16 RX ORDER — OXYTOCIN/RINGER'S LACTATE 30/500 ML
250 PLASTIC BAG, INJECTION (ML) INTRAVENOUS ONCE
Status: DISCONTINUED | OUTPATIENT
Start: 2022-11-16 | End: 2022-11-17 | Stop reason: HOSPADM

## 2022-11-16 RX ORDER — ACETAMINOPHEN 325 MG/1
650 TABLET ORAL EVERY 4 HOURS PRN
Status: DISCONTINUED | OUTPATIENT
Start: 2022-11-16 | End: 2022-11-17 | Stop reason: HOSPADM

## 2022-11-16 RX ADMIN — SODIUM CHLORIDE, SODIUM LACTATE, POTASSIUM CHLORIDE, AND CALCIUM CHLORIDE 125 ML/HR: .6; .31; .03; .02 INJECTION, SOLUTION INTRAVENOUS at 00:47

## 2022-11-16 RX ADMIN — DOCUSATE SODIUM 100 MG: 100 CAPSULE, LIQUID FILLED ORAL at 17:22

## 2022-11-16 RX ADMIN — Medication 1 APPLICATION: at 04:02

## 2022-11-16 RX ADMIN — DOCUSATE SODIUM 100 MG: 100 CAPSULE, LIQUID FILLED ORAL at 08:07

## 2022-11-16 NOTE — PLAN OF CARE
Problem: PAIN - ADULT  Goal: Verbalizes/displays adequate comfort level or baseline comfort level  Description: Interventions:  - Encourage patient to monitor pain and request assistance  - Assess pain using appropriate pain scale  - Administer analgesics based on type and severity of pain and evaluate response  - Implement non-pharmacological measures as appropriate and evaluate response  - Consider cultural and social influences on pain and pain management  - Notify physician/advanced practitioner if interventions unsuccessful or patient reports new pain  11/16/2022 0314 by Misty Salas  Outcome: Completed  11/15/2022 1950 by Misty Salas  Outcome: Progressing     Problem: INFECTION - ADULT  Goal: Absence or prevention of progression during hospitalization  Description: INTERVENTIONS:  - Assess and monitor for signs and symptoms of infection  - Monitor lab/diagnostic results  - Monitor all insertion sites, i e  indwelling lines, tubes, and drains  - Monitor endotracheal if appropriate and nasal secretions for changes in amount and color  - [place] appropriate cooling/warming therapies per order  - Administer medications as ordered  - Instruct and encourage patient and family to use good hand hygiene technique  - Identify and instruct in appropriate isolation precautions for identified infection/condition  11/16/2022 0314 by Misty Salas  Outcome: Completed  11/15/2022 1950 by Misty Salas  Outcome: Progressing  Goal: Absence of fever/infection during neutropenic period  Description: INTERVENTIONS:  - Monitor WBC    11/16/2022 0314 by Misty Salas  Outcome: Completed  11/15/2022 1950 by Misty Salas  Outcome: Progressing     Problem: SAFETY ADULT  Goal: Patient will remain free of falls  Description: INTERVENTIONS:  - Educate patient/family on patient safety including physical limitations  - Instruct patient to call for assistance with activity   - Consult OT/PT to assist with strengthening/mobility   - Keep Call bell within reach  - Keep bed low and locked with side rails adjusted as appropriate  - Keep care items and personal belongings within reach  - Initiate and maintain comfort rounds  - Make Fall Risk Sign visible to staff  - Offer Toileting every  Hours, in advance of need  - Initiate/Maintain alarm  - Obtain necessary fall risk management equipment:   - Apply yellow socks and bracelet for high fall risk patients  - Consider moving patient to room near nurses station  11/16/2022 0314 by Clari Estrada  Outcome: Completed  11/15/2022 1950 by Clari Estrada  Outcome: Progressing  Goal: Maintain or return to baseline ADL function  Description: INTERVENTIONS:  -  Assess patient's ability to carry out ADLs; assess patient's baseline for ADL function and identify physical deficits which impact ability to perform ADLs (bathing, care of mouth/teeth, toileting, grooming, dressing, etc )  - Assess/evaluate cause of self-care deficits   - Assess range of motion  - Assess patient's mobility; develop plan if impaired  - Assess patient's need for assistive devices and provide as appropriate  - Encourage maximum independence but intervene and supervise when necessary  - Involve family in performance of ADLs  - Assess for home care needs following discharge   - Consider OT consult to assist with ADL evaluation and planning for discharge  - Provide patient education as appropriate  11/16/2022 0314 by Clari Estrada  Outcome: Completed  11/15/2022 1950 by Clari Estrada  Outcome: Progressing  Goal: Maintains/Returns to pre admission functional level  Description: INTERVENTIONS:  - Perform BMAT or MOVE assessment daily    - Set and communicate daily mobility goal to care team and patient/family/caregiver  - Collaborate with rehabilitation services on mobility goals if consulted  - Perform Range of Motion  times a day  - Reposition patient every  hours    - Dangle patient  times a day  - Stand patient  times a day  - Ambulate patient  times a day  - Out of bed to chair  times a day   - Out of bed for meals times a day  - Out of bed for toileting  - Record patient progress and toleration of activity level   2022 by Emi Dunne  Outcome: Completed  11/15/2022 1950 by Emi Dunne  Outcome: Progressing     Problem: Knowledge Deficit  Goal: Patient/family/caregiver demonstrates understanding of disease process, treatment plan, medications, and discharge instructions  Description: Complete learning assessment and assess knowledge base    Interventions:  - Provide teaching at level of understanding  - Provide teaching via preferred learning methods  2022 by Emi Dunne  Outcome: Completed  11/15/2022 1950 by Emi Dunne  Outcome: Progressing     Problem: DISCHARGE PLANNING  Goal: Discharge to home or other facility with appropriate resources  Description: INTERVENTIONS:  - Identify barriers to discharge w/patient and caregiver  - Arrange for needed discharge resources and transportation as appropriate  - Identify discharge learning needs (meds, wound care, etc )  - Arrange for interpretive services to assist at discharge as needed  - Refer to Case Management Department for coordinating discharge planning if the patient needs post-hospital services based on physician/advanced practitioner order or complex needs related to functional status, cognitive ability, or social support system  2022 by Emi Dunne  Outcome: Completed  11/15/2022 1950 by Emi Dunne  Outcome: Progressing     Problem: ANTEPARTUM  Goal: Maintain pregnancy as long as maternal and/or fetal condition is stable  Description: INTERVENTIONS:  - Maternal surveillance  - Fetal surveillance  - Monitor uterine activity  - Medications as ordered  - Bedrest  2022 by Emi Dunne  Outcome: Completed  11/15/2022 1950 by Emi Dunne  Outcome: Progressing     Problem: BIRTH - VAGINAL/ SECTION  Goal: Fetal and maternal status remain reassuring during the birth process  Description: INTERVENTIONS:  - Monitor vital signs  - Monitor fetal heart rate  - Monitor uterine activity  - Monitor labor progression (vaginal delivery)  - DVT prophylaxis  - Antibiotic prophylaxis  11/16/2022 0314 by Evaristo Mathew  Outcome: Completed  11/15/2022 1950 by Evaristo Mathew  Outcome: Progressing  Goal: Emotionally satisfying birthing experience for mother/fetus  Description: Interventions:  - Assess, plan, implement and evaluate the nursing care given to the patient in labor  - Advocate the philosophy that each childbirth experience is a unique experience and support the family's chosen level of involvement and control during the labor process   - Actively participate in both the patient's and family's teaching of the birth process  - Consider cultural, [employer] and age-specific factors and plan care for the patient in labor  11/16/2022 0314 by Evaristo Mathew  Outcome: Completed  11/15/2022 1950 by Evaristo Mathew  Outcome: Progressing

## 2022-11-16 NOTE — DISCHARGE SUMMARY
Discharge Summary - OB/GYN   Babak Wills 44 y o  female MRN: [id number]  Unit/Bed#: -01 Encounter: [id number]      Admission Date: 11/15/2022     Discharge Date: 2022    Admitting Diagnosis:    Request for sterilization   • Migraine   • 39 weeks gestation of pregnancy   • AMA (advanced maternal age) multigravida 35+   • Obesity affecting pregnancy   • History of  delivery, currently pregnant   • Prenatal care in third trimester   • Fetal macrosomia       Discharge Diagnosis:   Same, delivered      Procedures: spontaneous vaginal delivery    Delivering Attending: Mamie Urias MD  Discharge Attending: Rogena Collet, MD    Hospital Course:     Babak Wills is a 44 y o  [id number] female at 36w3d who was admitted to L&D for elective induction of labor  Her labor course was notable for pitocin titration, epidural for anesthesia, and AROM for clear fluid  She progressed to complete at 0127, pushed for 7 min, and delivered a healthy  at 80  The patient began pushing with each contraction  The head reached +3 station  The fetal head was delivered with no immediate restitution  Gentle, steady downward guidance was attempted to deliver the anterior shoulder without success  Shoulder dystocia was immediately diagnosed, and nursing staff was informed  The patient was informed and instructed to stop pushing  The patient was placed in McRobert’s maneuver and the shoulder was successfully dislodged  The fetus was delivered  Time interval from delivery of the fetal head and simultaneous diagnosis of shoulder dystocia to complete shoulder delivery was less than 30 seconds  She delivered a viable female  on 22 at 80  Weight 7lbs 12 2 oz via spontaneous vaginal delivery  Apgars were 9 (1 min) and 9 (5 min)   was transferred to  nursery  Patient tolerated the procedure well and was transferred to recovery in stable condition       Her post-partum course was uncomplicated  On day of discharge, she was ambulating and able to reasonably perform all ADLs  She was voiding and had appropriate bowel function  Pain was well controlled  She was discharged home on post-partum day #1 without complications  Patient was instructed to follow up with her OB as an outpatient and was given appropriate warnings to call provider if she develops signs of infection or uncontrolled pain  Complications: none apparent    Condition at discharge: good     Discharge instructions/Information to patient and family:   See after visit summary for information provided to patient and family  Provisions for Follow-Up Care:  See after visit summary for information related to follow-up care and any pertinent home health orders  Disposition: See After Visit Summary for discharge disposition information  Planned Readmission: No    Discharge Medications: For a complete list of the patient's medications, please refer to her med rec

## 2022-11-16 NOTE — OB LABOR/OXYTOCIN SAFETY PROGRESS
Oxytocin Safety Progress Check Note - Saba Crawford 44 y o  female MRN: [id number]    Unit/Bed#: -01 Encounter: [id number]    Dose (josemanuel-units/min) Oxytocin: 6 josemanuel-units/min  Contraction Frequency (minutes): 2-5  Contraction Quality: Moderate  Tachysystole: No   Cervical Dilation: 4        Cervical Effacement: 70  Fetal Station: -3  Baseline Rate: 135 bpm  Fetal Heart Rate: 159 BPM  FHR Category: Category I               Vital Signs:   Vitals:    11/15/22 2154   BP: 101/56   Pulse: 97   Resp:    Temp:    SpO2:        Notes/comments: Patient comfortable with epidural at this time  FHT Cat I  Patient AROM for clear fluid at 2217  SVE as above  Continue to monitor           Vicente Baez MD 11/15/2022 10:26 PM

## 2022-11-16 NOTE — PLAN OF CARE
Problem: PAIN - ADULT  Goal: Verbalizes/displays adequate comfort level or baseline comfort level  Description: Interventions:  - Encourage patient to monitor pain and request assistance  - Assess pain using appropriate pain scale  - Administer analgesics based on type and severity of pain and evaluate response  - Implement non-pharmacological measures as appropriate and evaluate response  - Consider cultural and social influences on pain and pain management  - Notify physician/advanced practitioner if interventions unsuccessful or patient reports new pain  Outcome: Progressing     Problem: INFECTION - ADULT  Goal: Absence or prevention of progression during hospitalization  Description: INTERVENTIONS:  - Assess and monitor for signs and symptoms of infection  - Monitor lab/diagnostic results  - Monitor all insertion sites, i e  indwelling lines, tubes, and drains  - Monitor endotracheal if appropriate and nasal secretions for changes in amount and color  - [place] appropriate cooling/warming therapies per order  - Administer medications as ordered  - Instruct and encourage patient and family to use good hand hygiene technique  - Identify and instruct in appropriate isolation precautions for identified infection/condition  Outcome: Progressing  Goal: Absence of fever/infection during neutropenic period  Description: INTERVENTIONS:  - Monitor WBC    Outcome: Progressing     Problem: SAFETY ADULT  Goal: Patient will remain free of falls  Description: INTERVENTIONS:  - Educate patient/family on patient safety including physical limitations  - Instruct patient to call for assistance with activity   - Consult OT/PT to assist with strengthening/mobility   - Keep Call bell within reach  - Keep bed low and locked with side rails adjusted as appropriate  - Keep care items and personal belongings within reach  - Initiate and maintain comfort rounds  - Make Fall Risk Sign visible to staff  - Offer Toileting every  Hours, in advance of need  - Initiate/Maintain alarm  - Obtain necessary fall risk management equipment:   - Apply yellow socks and bracelet for high fall risk patients  - Consider moving patient to room near nurses station  Outcome: Progressing  Goal: Maintain or return to baseline ADL function  Description: INTERVENTIONS:  -  Assess patient's ability to carry out ADLs; assess patient's baseline for ADL function and identify physical deficits which impact ability to perform ADLs (bathing, care of mouth/teeth, toileting, grooming, dressing, etc )  - Assess/evaluate cause of self-care deficits   - Assess range of motion  - Assess patient's mobility; develop plan if impaired  - Assess patient's need for assistive devices and provide as appropriate  - Encourage maximum independence but intervene and supervise when necessary  - Involve family in performance of ADLs  - Assess for home care needs following discharge   - Consider OT consult to assist with ADL evaluation and planning for discharge  - Provide patient education as appropriate  Outcome: Progressing  Goal: Maintains/Returns to pre admission functional level  Description: INTERVENTIONS:  - Perform BMAT or MOVE assessment daily    - Set and communicate daily mobility goal to care team and patient/family/caregiver  - Collaborate with rehabilitation services on mobility goals if consulted  - Perform Range of Motion  times a day  - Reposition patient every  hours    - Dangle patient  times a day  - Stand patient  times a day  - Ambulate patient  times a day  - Out of bed to chair  times a day   - Out of bed for meals  times a day  - Out of bed for toileting  - Record patient progress and toleration of activity level   Outcome: Progressing     Problem: Knowledge Deficit  Goal: Patient/family/caregiver demonstrates understanding of disease process, treatment plan, medications, and discharge instructions  Description: Complete learning assessment and assess knowledge base   Interventions:  - Provide teaching at level of understanding  - Provide teaching via preferred learning methods  Outcome: Progressing     Problem: DISCHARGE PLANNING  Goal: Discharge to home or other facility with appropriate resources  Description: INTERVENTIONS:  - Identify barriers to discharge w/patient and caregiver  - Arrange for needed discharge resources and transportation as appropriate  - Identify discharge learning needs (meds, wound care, etc )  - Arrange for interpretive services to assist at discharge as needed  - Refer to Case Management Department for coordinating discharge planning if the patient needs post-hospital services based on physician/advanced practitioner order or complex needs related to functional status, cognitive ability, or social support system  Outcome: Progressing     Problem: POSTPARTUM  Goal: Experiences normal postpartum course  Description: INTERVENTIONS:  - Monitor maternal vital signs  - Assess uterine involution and lochia  Outcome: Progressing  Goal: Appropriate maternal -  bonding  Description: INTERVENTIONS:  - Identify family support  - Assess for appropriate maternal/infant bonding   -Encourage maternal/infant bonding opportunities  - Referral to  or  as needed  Outcome: Progressing  Goal: Establishment of infant feeding pattern  Description: INTERVENTIONS:  - Assess breast/bottle feeding  - Refer to lactation as needed  Outcome: Progressing  Goal: Incision(s), wounds(s) or drain site(s) healing without S/S of infection  Description: INTERVENTIONS  - Assess and document dressing, incision, wound bed, drain sites and surrounding tissue  - Provide patient and family education  - Perform skin care/dressing changes every   Outcome: Progressing

## 2022-11-16 NOTE — PLAN OF CARE
Problem: PAIN - ADULT  Goal: Verbalizes/displays adequate comfort level or baseline comfort level  Description: Interventions:  - Encourage patient to monitor pain and request assistance  - Assess pain using appropriate pain scale  - Administer analgesics based on type and severity of pain and evaluate response  - Implement non-pharmacological measures as appropriate and evaluate response  - Consider cultural and social influences on pain and pain management  - Notify physician/advanced practitioner if interventions unsuccessful or patient reports new pain  Outcome: Progressing     Problem: INFECTION - ADULT  Goal: Absence or prevention of progression during hospitalization  Description: INTERVENTIONS:  - Assess and monitor for signs and symptoms of infection  - Monitor lab/diagnostic results  - Monitor all insertion sites, i e  indwelling lines, tubes, and drains  - Monitor endotracheal if appropriate and nasal secretions for changes in amount and color  - [place] appropriate cooling/warming therapies per order  - Administer medications as ordered  - Instruct and encourage patient and family to use good hand hygiene technique  - Identify and instruct in appropriate isolation precautions for identified infection/condition  Outcome: Progressing  Goal: Absence of fever/infection during neutropenic period  Description: INTERVENTIONS:  - Monitor WBC    Outcome: Progressing     Problem: SAFETY ADULT  Goal: Patient will remain free of falls  Description: INTERVENTIONS:  - Educate patient/family on patient safety including physical limitations  - Instruct patient to call for assistance with activity   - Consult OT/PT to assist with strengthening/mobility   - Keep Call bell within reach  - Keep bed low and locked with side rails adjusted as appropriate  - Keep care items and personal belongings within reach  - Initiate and maintain comfort rounds  - Make Fall Risk Sign visible to staff  - Offer Toileting every  Hours, in advance of need  - Initiate/Maintain alarm  - Obtain necessary fall risk management equipment:   - Apply yellow socks and bracelet for high fall risk patients  - Consider moving patient to room near nurses station  Outcome: Progressing  Goal: Maintain or return to baseline ADL function  Description: INTERVENTIONS:  -  Assess patient's ability to carry out ADLs; assess patient's baseline for ADL function and identify physical deficits which impact ability to perform ADLs (bathing, care of mouth/teeth, toileting, grooming, dressing, etc )  - Assess/evaluate cause of self-care deficits   - Assess range of motion  - Assess patient's mobility; develop plan if impaired  - Assess patient's need for assistive devices and provide as appropriate  - Encourage maximum independence but intervene and supervise when necessary  - Involve family in performance of ADLs  - Assess for home care needs following discharge   - Consider OT consult to assist with ADL evaluation and planning for discharge  - Provide patient education as appropriate  Outcome: Progressing  Goal: Maintains/Returns to pre admission functional level  Description: INTERVENTIONS:  - Perform BMAT or MOVE assessment daily    - Set and communicate daily mobility goal to care team and patient/family/caregiver  - Collaborate with rehabilitation services on mobility goals if consulted  - Perform Range of Motion  times a day  - Reposition patient every  hours    - Dangle patient  times a day  - Stand patient  times a day  - Ambulate patient  times a day  - Out of bed to chair  times a day   - Out of bed for meals  times a day  - Out of bed for toileting  - Record patient progress and toleration of activity level   Outcome: Progressing     Problem: Knowledge Deficit  Goal: Patient/family/caregiver demonstrates understanding of disease process, treatment plan, medications, and discharge instructions  Description: Complete learning assessment and assess knowledge base   Interventions:  - Provide teaching at level of understanding  - Provide teaching via preferred learning methods  Outcome: Progressing     Problem: DISCHARGE PLANNING  Goal: Discharge to home or other facility with appropriate resources  Description: INTERVENTIONS:  - Identify barriers to discharge w/patient and caregiver  - Arrange for needed discharge resources and transportation as appropriate  - Identify discharge learning needs (meds, wound care, etc )  - Arrange for interpretive services to assist at discharge as needed  - Refer to Case Management Department for coordinating discharge planning if the patient needs post-hospital services based on physician/advanced practitioner order or complex needs related to functional status, cognitive ability, or social support system  Outcome: Progressing     Problem: ANTEPARTUM  Goal: Maintain pregnancy as long as maternal and/or fetal condition is stable  Description: INTERVENTIONS:  - Maternal surveillance  - Fetal surveillance  - Monitor uterine activity  - Medications as ordered  - Bedrest  Outcome: Progressing     Problem: BIRTH - VAGINAL/ SECTION  Goal: Fetal and maternal status remain reassuring during the birth process  Description: INTERVENTIONS:  - Monitor vital signs  - Monitor fetal heart rate  - Monitor uterine activity  - Monitor labor progression (vaginal delivery)  - DVT prophylaxis  - Antibiotic prophylaxis  Outcome: Progressing  Goal: Emotionally satisfying birthing experience for mother/fetus  Description: Interventions:  - Assess, plan, implement and evaluate the nursing care given to the patient in labor  - Advocate the philosophy that each childbirth experience is a unique experience and support the family's chosen level of involvement and control during the labor process   - Actively participate in both the patient's and family's teaching of the birth process  - Consider cultural, [employer] and age-specific factors and plan care for the patient in labor  Outcome: Progressing

## 2022-11-16 NOTE — OB LABOR/OXYTOCIN SAFETY PROGRESS
Oxytocin Safety Progress Check Note - Jeremy Bamberger 44 y o  female MRN: [id number]    Unit/Bed#: -01 Encounter: [id number]    Dose (josemanuel-units/min) Oxytocin: 4 josemanuel-units/min  Contraction Frequency (minutes): 2-3  Contraction Quality: Mild  Tachysystole: No   Cervical Dilation: 3        Cervical Effacement: 50  Fetal Station: -3  Baseline Rate: 135 bpm  Fetal Heart Rate: 160 BPM  FHR Category: Category I               Vital Signs:   Vitals:    11/15/22 1936   BP: 107/55   Pulse: 99   Resp:    Temp:        Notes/comments: FHT Cat II with intermittent late decelerations  SVE unchanged  Fetus responded well to scalp stim and FHT baseline 130bpm, moderate variability and 15x15 accels  Plan for epidural then AROM at next check           Sharif Blank MD 11/15/2022 8:04 PM

## 2022-11-16 NOTE — ANESTHESIA PROCEDURE NOTES
CSE Block    Patient location during procedure: OB  Start time: 11/15/2022 8:41 PM  Reason for block: procedure for pain and at surgeon's request  Staffing  Performed: Anesthesiologist   Anesthesiologist: Remberto Concepcion MD  CSE  Patient position: sitting  Prep: ChloraPrep  Patient monitoring: heart rate, continuous pulse ox and frequent blood pressure checks  Approach: midline  Spinal Needle  Needle type: pencil-tip   Needle gauge: 27 G  Needle length: 9 cm  Needle insertion depth: 7 cm  Epidural Needle  Injection technique: BETTYE saline  Needle type: Tuohy   Needle gauge: 18 G  Needle length: 9 cm  Needle insertion depth: 6 cm  Location: L3-L4  Catheter  Catheter type: end hole  Catheter size: 18 G  Catheter at skin depth: 12 cm  Catheter securement method: clear occlusive dressing  Assessment  Sensory level: T10  Injection Assessment:  negative aspiration for heme, no pain on injection and no paresthesia on injection

## 2022-11-16 NOTE — ANESTHESIA POSTPROCEDURE EVALUATION
Post-Op Assessment Note    CV Status:  Stable    Pain management: adequate     Mental Status:  Alert and awake   Hydration Status:  Stable   PONV Controlled:  None   Airway Patency:  Patent       Staff: CRNA     Post-op block assessment: catheter intact and no complications      No notable events documented  Epidural removed without incident

## 2022-11-16 NOTE — ANESTHESIA PREPROCEDURE EVALUATION
Procedure:  LABOR ANALGESIA    Relevant Problems   CARDIO   (+) Migraine      GYN   (+) 39 weeks gestation of pregnancy   (+) AMA (advanced maternal age) multigravida 35+      NEURO/PSYCH   (+) History of  delivery, currently pregnant   (+) Migraine        Physical Exam    Airway    Mallampati score: II         Dental   No notable dental hx     Cardiovascular      Pulmonary      Other Findings        Anesthesia Plan  ASA Score- 3     Anesthesia Type- epidural with ASA Monitors  Additional Monitors:   Airway Plan:     Comment: I, Dr José Miguel Escalera, the attending physician, have personally seen and evaluated the patient prior to anesthetic care  I have reviewed the pre-anesthetic record, and other medical records if appropriate to the anesthetic care  If a CRNA is involved in the case, I have reviewed the CRNA assessment, if present, and agree  The patient is in a suitable condition to proceed with my formulated anesthetic plan          Plan Factors-    Chart reviewed  Induction- intravenous  Postoperative Plan-     Informed Consent- Anesthetic plan and risks discussed with patient  I personally reviewed this patient with the CRNA  Discussed and agreed on the Anesthesia Plan with the CRNA  Catalino Gleason

## 2022-11-16 NOTE — OB LABOR/OXYTOCIN SAFETY PROGRESS
Oxytocin Safety Progress Check Note - Brian Meeks 44 y o  female MRN: [id number]    Unit/Bed#: -01 Encounter: [id number]    Dose (josemanuel-units/min) Oxytocin: 6 josemanuel-units/min  Contraction Frequency (minutes): 1 5-6  Contraction Quality: Moderate  Tachysystole: No   Baseline Rate: 135 bpm  Fetal Heart Rate: 138 BPM  FHR Category: Category II               Vital Signs:   Vitals:    11/16/22 0239   BP: 102/57   Pulse: 85   Resp:    Temp:    SpO2:        Notes/comments: FHT demonstrating recurring late decelerations  Pit turned down to 4 and then off  Patient repositioned           Mara Moreno MD 11/16/2022 3:05 AM

## 2022-11-16 NOTE — L&D DELIVERY NOTE
DELIVERY NOTE  Drew Home 44 y o  female MRN: [id number]  Unit/Bed#: -01 Encounter: [id number]    Obstetrician:    Dr Carolee Mohan MD    Assistant:   Dr Boneta Sickle, MD Dr Marline Scheuermann    Pre-Delivery Diagnosis:   Patient Active Problem List   Diagnosis   • Request for sterilization   • Migraine   • 39 weeks gestation of pregnancy   • AMA (advanced maternal age) multigravida 35+   • Obesity affecting pregnancy   • History of  delivery, currently pregnant   • Prenatal care in third trimester   • Fetal macrosomia       Post-Delivery Diagnosis:   Same as above - Delivered  Viable female fetus    Procedure:  Spontaneous vaginal delivery      Anesthesia:  epidural    Specimens:   Cord blood obtained   Placenta; normal appearing, central insertion, intact   Arterial and venous blood gases (below)     Gases:  Umbilical Cord Venous Blood Gas:  Results from last 7 days   Lab Units 22  0304   PH COV  7 400   PCO2 COV mm HG 38 1   HCO3 COV mmol/L 23 1   BASE EXC COV mmol/L -1 3*   O2 CT CD VB mL/dL 21 1   O2 HGB, VENOUS CORD % 49 4     Umbilical Cord Arterial Blood Gas:  Results from last 7 days   Lab Units 22  0304   PH COA  7 346   PCO2 COA  45 7   PO2 COA mm HG 21 8   HCO3 COA mmol/L 24 4   BASE EXC COA mmol/L -1 6*   O2 CONTENT CORD ART ml/dl 11 5   O2 HGB, ARTERIAL CORD % 50 3       Quantitative Blood Loss:   35 mL           Complications:    Shoulder dystocia    Brief Description of Labor Course:  Drew Home is a 44 y o  [id number] female at 39w1d who was admitted to L&D for elective induction of labor  Her labor course was notable for pitocin titration, epidural for anesthesia, and AROM for clear fluid  She progressed to complete at 0127, pushed for 7 min, and delivered a healthy  at 80  Description of Delivery:     The patient began pushing with each contraction  The head reached +3 station  The fetal head was delivered with no immediate restitution  Gentle, steady downward guidance was attempted to deliver the anterior left shoulder without success  Shoulder dystocia was immediately diagnosed, and nursing staff was informed  The patient was informed and instructed to stop pushing  The patient was placed in McRobert’s maneuver and the shoulder was successfully dislodged  A nuchal cord was not noted  The anterior left shoulder was delivered atraumatically with gentle downward traction  The contralateral arm was delivered with gentle upward traction  The remainder of the fetus delivered spontaneously at 80, resulting in a viable female   Time interval from delivery of the fetal head and simultaneous diagnosis of shoulder dystocia to complete shoulder delivery was less than 30 seconds  The infant had a spontaneous cry and was immediately handed off to awaiting baby nurse for evaluation  Cord gas and cord blood were collected and sent for analysis  The placenta delivered spontaneously intact with a 3-vessel cord with central insertion  The uterus was firm with bimanual massage and IV Pitocin with minimal bleeding from above  The perineum, vagina and cervix were inspected, and no perineal laceration were found  The patient tolerated the procedure well  The infant was carefully examined after initial resuscitation  There was spontaneously moving both arms without apparent weakness  There was no palpable fracture of clavicle or humerus  Sponge, lap, needle and instrument counts were correct at the end of the procedure   Outcome:  Living  with APGARS 9 (1 min) and 9 (5 min)   weight: pending      At the conclusion of the delivery, all needle, sponge, and instrument counts were noted to be correct  Patient tolerated the procedure well and was allowed to recover in labor and delivery room with family and  before being transferred to the post-partum floor       Conclusion:  Mother and baby are currently recovering nicely in stable condition  Attending Supervision:   Dr Carolee Mohan MD was present for the entire procedure      Rebecca Key MD   OB/GYN PGY-1   11/16/2022 3:44 AM

## 2022-11-16 NOTE — OB LABOR/OXYTOCIN SAFETY PROGRESS
Oxytocin Safety Progress Check Note - Major Peon 44 y o  female MRN: [id number]    Unit/Bed#: -01 Encounter: [id number]    Dose (josemanuel-units/min) Oxytocin: 8 josemanuel-units/min  Contraction Frequency (minutes): 1-5  Contraction Quality: Moderate  Tachysystole: No   Cervical Dilation: 5-6        Cervical Effacement: 80  Fetal Station: -2  Baseline Rate: 135 bpm  Fetal Heart Rate: 136 BPM  FHR Category: Category II               Vital Signs:   Vitals:    11/15/22 2340   BP: 114/57   Pulse: 83   Resp:    Temp:    SpO2:        Notes/comments: FHT demonstrating intermittent late decelerations with spontaneous return to baseline  Patient repositioned and IV fluids bolused  SVE as above  Continue to monitor           Angelica Lund MD 11/15/2022 11:46 PM

## 2022-11-16 NOTE — PLAN OF CARE
Problem: PAIN - ADULT  Goal: Verbalizes/displays adequate comfort level or baseline comfort level  Description: Interventions:  - Encourage patient to monitor pain and request assistance  - Assess pain using appropriate pain scale  - Administer analgesics based on type and severity of pain and evaluate response  - Implement non-pharmacological measures as appropriate and evaluate response  - Consider cultural and social influences on pain and pain management  - Notify physician/advanced practitioner if interventions unsuccessful or patient reports new pain  Outcome: Progressing     Problem: INFECTION - ADULT  Goal: Absence or prevention of progression during hospitalization  Description: INTERVENTIONS:  - Assess and monitor for signs and symptoms of infection  - Monitor lab/diagnostic results  - Monitor all insertion sites, i e  indwelling lines, tubes, and drains  - Monitor endotracheal if appropriate and nasal secretions for changes in amount and color  - [place] appropriate cooling/warming therapies per order  - Administer medications as ordered  - Instruct and encourage patient and family to use good hand hygiene technique  - Identify and instruct in appropriate isolation precautions for identified infection/condition  Outcome: Progressing  Goal: Absence of fever/infection during neutropenic period  Description: INTERVENTIONS:  - Monitor WBC    Outcome: Progressing     Problem: SAFETY ADULT  Goal: Patient will remain free of falls  Description: INTERVENTIONS:  - Educate patient/family on patient safety including physical limitations  - Instruct patient to call for assistance with activity   - Consult OT/PT to assist with strengthening/mobility   - Keep Call bell within reach  - Keep bed low and locked with side rails adjusted as appropriate  - Keep care items and personal belongings within reach  - Initiate and maintain comfort rounds  - Make Fall Risk Sign visible to staff  - Offer Toileting every  Hours, in advance of need  - Initiate/Maintain alarm  - Obtain necessary fall risk management equipment:   - Apply yellow socks and bracelet for high fall risk patients  - Consider moving patient to room near nurses station  Outcome: Progressing  Goal: Maintain or return to baseline ADL function  Description: INTERVENTIONS:  -  Assess patient's ability to carry out ADLs; assess patient's baseline for ADL function and identify physical deficits which impact ability to perform ADLs (bathing, care of mouth/teeth, toileting, grooming, dressing, etc )  - Assess/evaluate cause of self-care deficits   - Assess range of motion  - Assess patient's mobility; develop plan if impaired  - Assess patient's need for assistive devices and provide as appropriate  - Encourage maximum independence but intervene and supervise when necessary  - Involve family in performance of ADLs  - Assess for home care needs following discharge   - Consider OT consult to assist with ADL evaluation and planning for discharge  - Provide patient education as appropriate  Outcome: Progressing  Goal: Maintains/Returns to pre admission functional level  Description: INTERVENTIONS:  - Perform BMAT or MOVE assessment daily    - Set and communicate daily mobility goal to care team and patient/family/caregiver  - Collaborate with rehabilitation services on mobility goals if consulted  - Perform Range of Motion  times a day  - Reposition patient every  hours    - Dangle patient  times a day  - Stand patient  times a day  - Ambulate patient  times a day  - Out of bed to chair  times a day   - Out of bed for meals  times a day  - Out of bed for toileting  - Record patient progress and toleration of activity level   Outcome: Progressing     Problem: Knowledge Deficit  Goal: Patient/family/caregiver demonstrates understanding of disease process, treatment plan, medications, and discharge instructions  Description: Complete learning assessment and assess knowledge base   Interventions:  - Provide teaching at level of understanding  - Provide teaching via preferred learning methods  Outcome: Progressing     Problem: DISCHARGE PLANNING  Goal: Discharge to home or other facility with appropriate resources  Description: INTERVENTIONS:  - Identify barriers to discharge w/patient and caregiver  - Arrange for needed discharge resources and transportation as appropriate  - Identify discharge learning needs (meds, wound care, etc )  - Arrange for interpretive services to assist at discharge as needed  - Refer to Case Management Department for coordinating discharge planning if the patient needs post-hospital services based on physician/advanced practitioner order or complex needs related to functional status, cognitive ability, or social support system  Outcome: Progressing     Problem: POSTPARTUM  Goal: Experiences normal postpartum course  Description: INTERVENTIONS:  - Monitor maternal vital signs  - Assess uterine involution and lochia  Outcome: Progressing  Goal: Appropriate maternal -  bonding  Description: INTERVENTIONS:  - Identify family support  - Assess for appropriate maternal/infant bonding   -Encourage maternal/infant bonding opportunities  - Referral to  or  as needed  Outcome: Progressing  Goal: Establishment of infant feeding pattern  Description: INTERVENTIONS:  - Assess breast/bottle feeding  - Refer to lactation as needed  Outcome: Progressing  Goal: Incision(s), wounds(s) or drain site(s) healing without S/S of infection  Description: INTERVENTIONS  - Assess and document dressing, incision, wound bed, drain sites and surrounding tissue  - Provide patient and family education  - Perform skin care/dressing changes every   Outcome: Progressing

## 2022-11-17 ENCOUNTER — TRANSITIONAL CARE MANAGEMENT (OUTPATIENT)
Dept: FAMILY MEDICINE CLINIC | Facility: CLINIC | Age: 37
End: 2022-11-17

## 2022-11-17 VITALS
BODY MASS INDEX: 43.39 KG/M2 | SYSTOLIC BLOOD PRESSURE: 102 MMHG | RESPIRATION RATE: 20 BRPM | DIASTOLIC BLOOD PRESSURE: 55 MMHG | HEART RATE: 84 BPM | OXYGEN SATURATION: 97 % | TEMPERATURE: 97.5 F | HEIGHT: 60 IN | WEIGHT: 221 LBS

## 2022-11-17 RX ORDER — DOCUSATE SODIUM 100 MG/1
100 CAPSULE, LIQUID FILLED ORAL 2 TIMES DAILY
Qty: 60 CAPSULE | Refills: 0 | Status: SHIPPED | OUTPATIENT
Start: 2022-11-17

## 2022-11-17 RX ORDER — DIAPER,BRIEF,INFANT-TODD,DISP
1 EACH MISCELLANEOUS DAILY PRN
Qty: 30 G | Refills: 0 | Status: SHIPPED | OUTPATIENT
Start: 2022-11-17

## 2022-11-17 NOTE — ASSESSMENT & PLAN NOTE
Lochia WNL   Recovering well   Appropriate bowel and bladder function   Pain well controlled   Tolerating diet   Bottle Feeding  Ambulating without issues   No lower extremity tenderness  GBS negative  Rh positive  PP Contraception: MA-31 signed 9/1/22, declines bridge

## 2022-11-17 NOTE — UTILIZATION REVIEW
NOTIFICATION OF INPATIENT ADMISSION   MATERNITY/DELIVERY AUTHORIZATION REQUEST   SERVICING FACILITY:   Critical access hospital - L&D, , NICU  [address]  Tax ID: [id number]  NPI: [id number]   ATTENDING PROVIDER:  Attending Name and NPI#: Sean Salvador Md [[id number]]  Address: [address] [place] [address]  Phone: [phone]   ADMISSION INFORMATION:  Place of Service: Inpatient [address] [facility]  [address]  Place of Service Code: 21  Inpatient Admission Date/Time: 11/15/22  3:08 PM  Discharge Date/Time: 2022 11:50 AM  Admitting Diagnosis Code/Description:  Encounter for induction of labor [Z34 90]  Encounter for full-term uncomplicated delivery [N68]     Mother: Paece Moss 1985 Estimated Date of Delivery: 22  Delivering clinician: Sean Salvador    OB History        12    Para   6    Term   4       2    AB   6    Living   5       SAB   4    IAB   2    Ectopic   0    Multiple   0    Live Births   5                Name & MRN:   Information for the patient's :  Alea Millan Girl Neyda Keys) [[id number]]      Delivery Information:  Sex: female  Delivered 2022 1:43 AM by Vaginal, Spontaneous; Gestational Age: 36w3d    [place] Measurements:  Weight: 7 lb 12 2 oz (3520 g); Height: 20"    APGAR 1 minute 5 minutes 10 minutes   Totals: 9 9      [place] Birth Information: 44 y o  female MRN: [id number] Unit/Bed#: -01   Birthweight: No birth weight on file  Gestational Age: <None> Delivery Type:    APGARS Totals:        UTILIZATION REVIEW CONTACT:  [employer] Utilization   Network Utilization Review Department  Phone: [phone]  Fax [phone]  Email: [email or web address]  Contact for approvals/pending authorizations, clinical reviews, and discharge       PHYSICIAN ADVISORY SERVICES:  Medical Necessity Denial & [employer] Review  Phone: [phone]  Fax: [phone]  Email: [email or web address]

## 2022-11-17 NOTE — LACTATION NOTE
This note was copied from a baby's chart    CONSULT - LACTATION  Baby Girl ([address] [place] [address]) Ray Founds 0 days female MRN: [id number]    [facility] Room / Bed: (N)/(N) Encounter: [id number]    Maternal Information     MOTHER:  Lucina Barrett  Maternal Age: 44 y o    OB History: # 1 - Date: None, Sex: None, Weight: None, GA: None, Delivery: None, Apgar1: None, Apgar5: None, Living: None, Birth Comments: None    # 2 - Date: None, Sex: None, Weight: None, GA: None, Delivery: None, Apgar1: None, Apgar5: None, Living: None, Birth Comments: None    # 3 - Date: None, Sex: None, Weight: None, GA: None, Delivery: None, Apgar1: None, Apgar5: None, Living: None, Birth Comments: None    # 4 - Date: None, Sex: None, Weight: None, GA: None, Delivery: None, Apgar1: None, Apgar5: None, Living: None, Birth Comments: None    # 5 - Date: None, Sex: None, Weight: None, GA: None, Delivery: None, Apgar1: None, Apgar5: None, Living: None, Birth Comments: None    # 6 - Date: None, Sex: None, Weight: None, GA: None, Delivery: None, Apgar1: None, Apgar5: None, Living: None, Birth Comments: None    # 7 - Date: 10/18/02, Sex: Female, Weight: None, GA: 35w0d, Delivery: Vaginal, Spontaneous, Apgar1: None, Apgar5: None, Living: Living, Birth Comments: None    # 8 - Date: 08/09/05, Sex: Female, Weight: None, GA: None, Delivery: Vaginal, Spontaneous, Apgar1: None, Apgar5: None, Living: Living, Birth Comments: None    # 9 - Date: 03/27/15, Sex: Male, Weight: None, GA: None, Delivery: Vaginal, Spontaneous, Apgar1: None, Apgar5: None, Living: Living, Birth Comments: None    # 10 - Date: 03/25/17, Sex: Male, Weight: None, GA: 22w0d, Delivery: None, Apgar1: None, Apgar5: None, Living: Fetal Demise, Birth Comments: None    # 11 - Date: 06/22/20, Sex: Male, Weight: 2995 g (6 lb 9 6 oz), GA: 37w2d, Delivery: Vaginal, Spontaneous, Apgar1: 9, Apgar5: 9, Living: Living, Birth Comments: None    # 12 - Date: 11/16/22, Sex: Female, Weight: 3520 g (7 lb 12 2 oz), GA: 39w1d, Delivery: Vaginal, Spontaneous, Apgar1: 9, Apgar5: 9, Living: Living, Birth Comments: None   Previouse breast reduction surgery? No    Lactation history:   Has patient previously breast fed: Yes   How long had patient previously breast fed: a few months at the longest   Previous breast feeding complications: Infant separation     Past Surgical History:   Procedure Laterality Date   • LAPAROSCOPIC CHOLECYSTECTOMY     • LITHOTRIPSY     • TONSILLECTOMY          Birth information:  YOB: 2023   Time of birth: 1:43 AM   Sex: female   Delivery type: Vaginal, Spontaneous   Birth Weight: 3520 g (7 lb 12 2 oz)   Percent of Weight Change: 0%     Gestational Age: 36w3d   [unfilled]    Assessment     Breast and nipple assessment: no clinical assessment    [place] Assessment: no clinical assessment    Feeding assessment: feeding well as per mom  LATCH:  Latch: Audible Swallowing:     Type of Nipple:     Comfort (Breast/Nipple):     Hold (Positioning):     LATCH Score:            Feeding recommendations:  pump every 2-3 hours and supplement with expressed colostrum via syringe and non-human substitute  meet early feeding cues  Demonstration of hand expression  Review of pumping and hands on pumping  Reivew of RSB/DC    Enc  To call lactation to see a latch  Information on hand expression given  Discussed benefits of knowing how to manually express breast including stimulating milk supply, softening nipple for latch and evacuating breast in the event of engorgement  Mom is encouraged to place baby skin to skin for feedings  Skin to skin education provided for baby placement on mother's chest, baby only in diaper, blankets below shoulders on baby's back  Skin to skin is encouraged to continue at home for feedings and between feedings      Worked on positioning infant up at chest level and starting to feed infant with nose arriving at the nipple  Then, using areolar compression to achieve a deep latch that is comfortable and exchanges optimum amounts of milk  - Start feedings on breast that last feeding ended   - allow no more than 3 hours between breast feeding sessions   - time between feedings is counted from the beginning of the first feed to the beginning of the next feeding session    Reviewed early signs of hunger, including tensing of hands and shoulders - no need to wait for open eyes  Crying is a late hunger sign  If baby is crying, soothe baby first and then attempt to latch  Reviewed normal sucking patterns: transition from stimulation to nutritive to release or non-nutritive  The goal is to see and hear lots of swallowing  Reviewed normal nursing pattern: infant could latch on one breast up to 30 minutes or until releases on own  Signs of satiation is open hand with fingers that do not grab your finger  Discussed difference in sensation of non-nutritive v nutritive sucking    Met with mother  Provided mother with Ready, Set, Baby booklet  Discussed Skin to Skin contact an benefits to mom and baby  Talked about the delay of the first bath until baby has adjusted  Spoke about the benefits of rooming in  Feeding on cue and what that means for recognizing infant's hunger  Avoidance of pacifiers for the first month discussed  Talked about exclusive breastfeeding for the first 6 months  Positioning and latch reviewed as well as showing images of other feeding positions  Discussed the properties of a good latch in any position  Reviewed hand/manual expression  Discussed s/s that baby is getting enough milk and some s/s that breastfeeding dyad may need further help  Gave information on common concerns, what to expect the first few weeks after delivery, preparing for other caregivers, and how partners can help  Resources for support also provided      Encouraged parents to call for assistance, questions, and concerns about breastfeeding  Extension provided  Provided education on growth spurts, when to introduce bottles; paced bottle feeding, and non-nutritive suck at the breast  Provided education on Signs of satiation  Encouraged to call lactation to observe a latch prior to discharge for reassurance  Encouraged to call baby and me with any questions and closely monitor output        Solo Masterson 11/16/2022 7:21 PM

## 2022-11-17 NOTE — PROGRESS NOTES
MD John with [facility] notified of new onset of compressible but tight/tense feeling bone flap. MD John to notify [facility] team. No new orders at this time. Will continue to monitor.   POSTPARTUM NOTE  Elder Richmond 44 y o  female MRN: [id number]  Unit/Bed#: -01 Encounter: [id number]    Date: 11/17/22  Procedure: Spontaneous Vaginal Delivery  Postpartum/Postop Day #: 1     SUBJECTIVE:  Pain: chronic low back pain  Tolerating Oral Intake: yes   Voiding: yes  Flatus: yes  Bowel Movement: yes  Ambulating: yes  Breastfeeding: yes  Chest Pain: no  Shortness of Breath: no  Leg Pain/Discomfort: no  Lochia: moderate  Other: Declines bridging contraception prior to planned tubal ligation    OBJECTIVE:   Vitals: Temp:  [98 2 °F (36 8 °C)-98 5 °F (36 9 °C)] 98 5 °F (36 9 °C)  HR:  [83-95] 83  Resp:  [18-20] 20  BP: (107-120)/(56-69) 111/67  General: No Acute Distress   Cardiovascular: Regular, Rate and Rhythm, no murmur, normal S1/S2  Lungs: Clear to Auscultation Bilaterally, no wheezing, non-labored breathing  Abdomen: Soft, non-distended, non-tender, no rebound, or guarding  Fundus: Firm & Non-Tender, Fundal Location: -2 cm below the umbilicus  Lower Extremities: Non-tender, Edema 2+  Other:    LABS / TESTS / MEDICATION:    Recent Results (from the past 72 hour(s))   Type and screen    Collection Time: 11/15/22  4:18 PM   Result Value Ref Range    ABO Grouping A     Rh Factor Positive     Antibody Screen Negative     Specimen Expiration Date 20221118    CBC and differential    Collection Time: 11/15/22  4:18 PM   Result Value Ref Range    WBC 8 52 4 31 - 10 16 Thousand/uL    RBC 3 53 (L) 3 81 - 5 12 Million/uL    Hemoglobin 10 6 (L) 11 5 - 15 4 g/dL    Hematocrit 32 4 (L) 34 8 - 46 1 %    MCV 92 82 - 98 fL    MCH 30 0 26 8 - 34 3 pg    MCHC 32 7 31 4 - 37 4 g/dL    RDW 14 3 11 6 - 15 1 %    MPV 10 8 8 9 - 12 7 fL    Platelets 619 977 - 229 Thousands/uL    nRBC 0 /100 WBCs    Neutrophils Relative 71 43 - 75 %    Immat GRANS % 1 0 - 2 %    Lymphocytes Relative 19 14 - 44 %    Monocytes Relative 8 4 - 12 %    Eosinophils Relative 1 0 - 6 %    Basophils Relative 0 0 - 1 %    Neutrophils Absolute 6 10 1 85 - 7 62 Thousands/µL    Immature Grans Absolute 0 05 0 00 - 0 20 Thousand/uL    Lymphocytes Absolute 1 64 0 60 - 4 47 Thousands/µL    Monocytes Absolute 0 64 0 17 - 1 22 Thousand/µL    Eosinophils Absolute 0 06 0 00 - 0 61 Thousand/µL    Basophils Absolute 0 03 0 00 - 0 10 Thousands/µL   RPR    Collection Time: 11/15/22  4:18 PM   Result Value Ref Range    RPR Non-Reactive Non-Reactive   Comprehensive metabolic panel    Collection Time: 11/15/22  4:18 PM   Result Value Ref Range    Sodium 135 135 - 147 mmol/L    Potassium 4 0 3 5 - 5 3 mmol/L    Chloride 106 96 - 108 mmol/L    CO2 21 21 - 32 mmol/L    ANION GAP 8 4 - 13 mmol/L    BUN 10 5 - 25 mg/dL    Creatinine 0 45 (L) 0 60 - 1 30 mg/dL    Glucose 83 65 - 140 mg/dL    Calcium 8 7 8 4 - 10 2 mg/dL    AST 14 13 - 39 U/L    ALT 9 7 - 52 U/L    Alkaline Phosphatase 114 (H) 34 - 104 U/L    Total Protein 6 8 6 4 - 8 4 g/dL    Albumin 3 6 3 5 - 5 0 g/dL    Total Bilirubin 0 44 0 20 - 1 00 mg/dL    eGFR 127 ml/min/1 73sq m   HEMOGLOBIN A1C W/ EAG ESTIMATION    Collection Time: 11/15/22  4:18 PM   Result Value Ref Range    Hemoglobin A1C 5 3 Normal 3 8-5 6%; PreDiabetic 5 7-6 4%;  Diabetic >=6 5%; Glycemic control for adults with diabetes <7 0% %     mg/dl   Fingerstick Glucose (POCT)    Collection Time: 11/15/22  4:32 PM   Result Value Ref Range    POC Glucose 79 65 - 140 mg/dl   Fingerstick Glucose (POCT)    Collection Time: 11/15/22  6:30 PM   Result Value Ref Range    POC Glucose 80 65 - 140 mg/dl   Fingerstick Glucose (POCT)    Collection Time: 11/15/22  8:59 PM   Result Value Ref Range    POC Glucose 83 65 - 140 mg/dl   Fingerstick Glucose (POCT)    Collection Time: 11/15/22 10:37 PM   Result Value Ref Range    POC Glucose 85 65 - 140 mg/dl   Fingerstick Glucose (POCT)    Collection Time: 11/16/22 12:52 AM   Result Value Ref Range    POC Glucose 92 65 - 140 mg/dl   CORD, Blood gas, arterial    Collection Time: 11/16/22  3:04 AM   Result Value Ref Range pH, Cord Art 7 346 7 230 - 7 430    pCO2, Cord Art 45 7 30 0 - 60 0    pO2, Cord Art 21 8 5 0 - 25 0 mm HG    HCO3, Cord Art 24 4 17 3 - 27 3 mmol/L    Base Exc, Cord Art -1 6 (L) 3 0 - 11 0 mmol/L    O2 Content, Cord Art 11 5 ml/dl    O2 Hgb, Arterial Cord 50 3 %   CORD, Blood gas, venous    Collection Time: 11/16/22  3:04 AM   Result Value Ref Range    pH, Cord Paul 7 400 7 190 - 7 490    pCO2, Cord Paul 38 1 27 0 - 43 0 mm HG    pO2, Cord Paul 47 1 (H) 15 0 - 45 0 mm HG    HCO3, Cord Paul 23 1 12 2 - 28 6 mmol/L    Base Exc, Cord Paul -1 3 (L) 1 0 - 9 0 mmol/L    O2 Cont, Cord Paul 21 1 mL/dL    O2 HGB,VENOUS CORD 90 3 %       docusate sodium, 100 mg, Oral, BID  ibuprofen, 600 mg, Oral, Q6H  oxytocin, 250 josemanuel-units/min, Intravenous, Once      acetaminophen, 650 mg, Q4H PRN  benzocaine-menthol-lanolin-aloe, 1 application, N8A PRN  calcium carbonate, 1,000 mg, TID PRN  hydrocortisone, 1 application, Daily PRN  ondansetron, 4 mg, Q8H PRN  witch hazel-glycerin, 1 pad, Q4H PRN        ASSESSMENT:   44 y o  [id number] s/p Spontaneous Vaginal Delivery Postpartum day  1  PLAN:  1) Delivery: Continue routine postpartum care, encourage ambulation, advance diet as tolerated    Signature / Title: Keyshawn Brito MD, Family Medicine  Date: 11/17/2022  Time: 6:24 AM

## 2022-11-17 NOTE — PLAN OF CARE
Problem: PAIN - ADULT  Goal: Verbalizes/displays adequate comfort level or baseline comfort level  Description: Interventions:  - Encourage patient to monitor pain and request assistance  - Assess pain using appropriate pain scale  - Administer analgesics based on type and severity of pain and evaluate response  - Implement non-pharmacological measures as appropriate and evaluate response  - Consider cultural and social influences on pain and pain management  - Notify physician/advanced practitioner if interventions unsuccessful or patient reports new pain  11/17/2022 1001 by Matt Garcia RN  Outcome: Progressing  11/17/2022 1001 by Matt Garcia RN  Outcome: Progressing     Problem: INFECTION - ADULT  Goal: Absence or prevention of progression during hospitalization  Description: INTERVENTIONS:  - Assess and monitor for signs and symptoms of infection  - Monitor lab/diagnostic results  - Monitor all insertion sites, i e  indwelling lines, tubes, and drains  - Monitor endotracheal if appropriate and nasal secretions for changes in amount and color  - [place] appropriate cooling/warming therapies per order  - Administer medications as ordered  - Instruct and encourage patient and family to use good hand hygiene technique  - Identify and instruct in appropriate isolation precautions for identified infection/condition  11/17/2022 1001 by Matt Garcia RN  Outcome: Progressing  11/17/2022 1001 by Matt Garcia RN  Outcome: Progressing  Goal: Absence of fever/infection during neutropenic period  Description: INTERVENTIONS:  - Monitor WBC    11/17/2022 1001 by Matt Garcia RN  Outcome: Progressing  11/17/2022 1001 by Matt Garcia RN  Outcome: Progressing     Problem: SAFETY ADULT  Goal: Patient will remain free of falls  Description: INTERVENTIONS:  - Educate patient/family on patient safety including physical limitations  - Instruct patient to call for assistance with activity - Consult OT/PT to assist with strengthening/mobility   - Keep Call bell within reach  - Keep bed low and locked with side rails adjusted as appropriate  - Keep care items and personal belongings within reach  - Initiate and maintain comfort rounds  - Make Fall Risk Sign visible to staff  - Offer Toileting in advance of need  - Obtain necessary fall risk management equipment  - Apply yellow socks and bracelet for high fall risk patients  - Consider moving patient to room near nurses station  11/17/2022 1001 by Cherie Franco RN  Outcome: Progressing  11/17/2022 1001 by Cherie Franco RN  Outcome: Progressing  Goal: Maintain or return to baseline ADL function  Description: INTERVENTIONS:  -  Assess patient's ability to carry out ADLs; assess patient's baseline for ADL function and identify physical deficits which impact ability to perform ADLs (bathing, care of mouth/teeth, toileting, grooming, dressing, etc )  - Assess/evaluate cause of self-care deficits   - Assess range of motion  - Assess patient's mobility; develop plan if impaired  - Assess patient's need for assistive devices and provide as appropriate  - Encourage maximum independence but intervene and supervise when necessary  - Involve family in performance of ADLs  - Assess for home care needs following discharge   - Consider OT consult to assist with ADL evaluation and planning for discharge  - Provide patient education as appropriate  11/17/2022 1001 by Cherie Franco RN  Outcome: Progressing  11/17/2022 1001 by Cherie Franco RN  Outcome: Progressing  Goal: Maintains/Returns to pre admission functional level  Description: INTERVENTIONS:  - Perform BMAT or MOVE assessment daily    - Set and communicate daily mobility goal to care team and patient/family/caregiver     - Collaborate with rehabilitation services on mobility goals if consulted  - Perform Range of Motion   - Reposition patient   - Dangle patient  - Stand patient   - Ambulate patient   - Out of bed to chair   - Out of bed for meals   - Out of bed for toileting  - Record patient progress and toleration of activity level   2022 1001 by Daren Carlisle RN  Outcome: Progressing  2022 1001 by Daren Carlisle RN  Outcome: Progressing     Problem: Knowledge Deficit  Goal: Patient/family/caregiver demonstrates understanding of disease process, treatment plan, medications, and discharge instructions  Description: Complete learning assessment and assess knowledge base    Interventions:  - Provide teaching at level of understanding  - Provide teaching via preferred learning methods  2022 1001 by Daren Carlisle RN  Outcome: Progressing  2022 1001 by Daren Carlisle RN  Outcome: Progressing     Problem: DISCHARGE PLANNING  Goal: Discharge to home or other facility with appropriate resources  Description: INTERVENTIONS:  - Identify barriers to discharge w/patient and caregiver  - Arrange for needed discharge resources and transportation as appropriate  - Identify discharge learning needs (meds, wound care, etc )  - Arrange for interpretive services to assist at discharge as needed  - Refer to Case Management Department for coordinating discharge planning if the patient needs post-hospital services based on physician/advanced practitioner order or complex needs related to functional status, cognitive ability, or social support system  2022 1001 by Daren Carlisle RN  Outcome: Progressing  2022 100 by Daren Carlisle RN  Outcome: Progressing     Problem: POSTPARTUM  Goal: Experiences normal postpartum course  Description: INTERVENTIONS:  - Monitor maternal vital signs  - Assess uterine involution and lochia  2022 1001 by Daren Carlisle RN  Outcome: Progressing  2022 1001 by Daren Carlisle RN  Outcome: Progressing  Goal: Appropriate maternal -  bonding  Description: INTERVENTIONS:  - Identify family support  - Assess for appropriate maternal/infant bonding   -Encourage maternal/infant bonding opportunities  - Referral to  or  as needed  11/17/2022 1001 by Jamison Jones RN  Outcome: Progressing  11/17/2022 1001 by Jamison Jones RN  Outcome: Progressing  Goal: Establishment of infant feeding pattern  Description: INTERVENTIONS:  - Assess breast/bottle feeding  - Refer to lactation as needed  11/17/2022 1001 by Jamison Jones RN  Outcome: Progressing  11/17/2022 1001 by Jamison Jones RN  Outcome: Progressing  Goal: Incision(s), wounds(s) or drain site(s) healing without S/S of infection  Description: INTERVENTIONS  - Assess and document dressing, incision, wound bed, drain sites and surrounding tissue  - Provide patient and family education  - Perform skin care/dressing changes  11/17/2022 1001 by Jamison Jones RN  Outcome: Progressing  11/17/2022 1001 by Jamison Jones RN  Outcome: Progressing

## 2022-11-17 NOTE — PLAN OF CARE
Problem: PAIN - ADULT  Goal: Verbalizes/displays adequate comfort level or baseline comfort level  Description: Interventions:  - Encourage patient to monitor pain and request assistance  - Assess pain using appropriate pain scale  - Administer analgesics based on type and severity of pain and evaluate response  - Implement non-pharmacological measures as appropriate and evaluate response  - Consider cultural and social influences on pain and pain management  - Notify physician/advanced practitioner if interventions unsuccessful or patient reports new pain  Outcome: Progressing     Problem: INFECTION - ADULT  Goal: Absence or prevention of progression during hospitalization  Description: INTERVENTIONS:  - Assess and monitor for signs and symptoms of infection  - Monitor lab/diagnostic results  - Monitor all insertion sites, i e  indwelling lines, tubes, and drains  - Monitor endotracheal if appropriate and nasal secretions for changes in amount and color  - [place] appropriate cooling/warming therapies per order  - Administer medications as ordered  - Instruct and encourage patient and family to use good hand hygiene technique  - Identify and instruct in appropriate isolation precautions for identified infection/condition  Outcome: Progressing  Goal: Absence of fever/infection during neutropenic period  Description: INTERVENTIONS:  - Monitor WBC    Outcome: Progressing     Problem: SAFETY ADULT  Goal: Patient will remain free of falls  Description: INTERVENTIONS:  - Educate patient/family on patient safety including physical limitations  - Instruct patient to call for assistance with activity   - Consult OT/PT to assist with strengthening/mobility   - Keep Call bell within reach  - Keep bed low and locked with side rails adjusted as appropriate  - Keep care items and personal belongings within reach  - Initiate and maintain comfort rounds  - Make Fall Risk Sign visible to staff  - Offer Toileting in advance of need  - Obtain necessary fall risk management equipment  - Apply yellow socks and bracelet for high fall risk patients  - Consider moving patient to room near nurses station  Outcome: Progressing  Goal: Maintain or return to baseline ADL function  Description: INTERVENTIONS:  -  Assess patient's ability to carry out ADLs; assess patient's baseline for ADL function and identify physical deficits which impact ability to perform ADLs (bathing, care of mouth/teeth, toileting, grooming, dressing, etc )  - Assess/evaluate cause of self-care deficits   - Assess range of motion  - Assess patient's mobility; develop plan if impaired  - Assess patient's need for assistive devices and provide as appropriate  - Encourage maximum independence but intervene and supervise when necessary  - Involve family in performance of ADLs  - Assess for home care needs following discharge   - Consider OT consult to assist with ADL evaluation and planning for discharge  - Provide patient education as appropriate  Outcome: Progressing  Goal: Maintains/Returns to pre admission functional level  Description: INTERVENTIONS:  - Perform BMAT or MOVE assessment daily    - Set and communicate daily mobility goal to care team and patient/family/caregiver  - Collaborate with rehabilitation services on mobility goals if consulted  - Perform Range of Motion   - Reposition patient   - Dangle patient  - Stand patient   - Ambulate patient   - Out of bed to chair   - Out of bed for meals   - Out of bed for toileting  - Record patient progress and toleration of activity level   Outcome: Progressing     Problem: Knowledge Deficit  Goal: Patient/family/caregiver demonstrates understanding of disease process, treatment plan, medications, and discharge instructions  Description: Complete learning assessment and assess knowledge base    Interventions:  - Provide teaching at level of understanding  - Provide teaching via preferred learning methods  Outcome: Progressing     Problem: DISCHARGE PLANNING  Goal: Discharge to home or other facility with appropriate resources  Description: INTERVENTIONS:  - Identify barriers to discharge w/patient and caregiver  - Arrange for needed discharge resources and transportation as appropriate  - Identify discharge learning needs (meds, wound care, etc )  - Arrange for interpretive services to assist at discharge as needed  - Refer to Case Management Department for coordinating discharge planning if the patient needs post-hospital services based on physician/advanced practitioner order or complex needs related to functional status, cognitive ability, or social support system  Outcome: Progressing     Problem: POSTPARTUM  Goal: Experiences normal postpartum course  Description: INTERVENTIONS:  - Monitor maternal vital signs  - Assess uterine involution and lochia  Outcome: Progressing  Goal: Appropriate maternal -  bonding  Description: INTERVENTIONS:  - Identify family support  - Assess for appropriate maternal/infant bonding   -Encourage maternal/infant bonding opportunities  - Referral to  or  as needed  Outcome: Progressing  Goal: Establishment of infant feeding pattern  Description: INTERVENTIONS:  - Assess breast/bottle feeding  - Refer to lactation as needed  Outcome: Progressing  Goal: Incision(s), wounds(s) or drain site(s) healing without S/S of infection  Description: INTERVENTIONS  - Assess and document dressing, incision, wound bed, drain sites and surrounding tissue  - Provide patient and family education  - Perform skin care/dressing changes  Outcome: Progressing

## 2022-11-17 NOTE — LACTATION NOTE
This note was copied from a baby's chart  Discharge Lactation: Mom states pumping is going well  Reviewed d/c book and baby and me to ask questions / help  Provided education on growth spurts, when to introduce bottles; paced bottle feeding, and non-nutritive suck at the breast  Provided education on Signs of satiation  Encouraged to call lactation to observe a latch prior to discharge for reassurance  Encouraged to call baby and me with any questions and closely monitor output

## 2022-11-17 NOTE — PLAN OF CARE
Problem: PAIN - ADULT  Goal: Verbalizes/displays adequate comfort level or baseline comfort level  Description: Interventions:  - Encourage patient to monitor pain and request assistance  - Assess pain using appropriate pain scale  - Administer analgesics based on type and severity of pain and evaluate response  - Implement non-pharmacological measures as appropriate and evaluate response  - Consider cultural and social influences on pain and pain management  - Notify physician/advanced practitioner if interventions unsuccessful or patient reports new pain  11/17/2022 1049 by Daren Carlisle RN  Outcome: Completed  11/17/2022 1001 by Daren Carlisle RN  Outcome: Progressing  11/17/2022 1001 by Daren Carlisle RN  Outcome: Progressing     Problem: INFECTION - ADULT  Goal: Absence or prevention of progression during hospitalization  Description: INTERVENTIONS:  - Assess and monitor for signs and symptoms of infection  - Monitor lab/diagnostic results  - Monitor all insertion sites, i e  indwelling lines, tubes, and drains  - Monitor endotracheal if appropriate and nasal secretions for changes in amount and color  - [place] appropriate cooling/warming therapies per order  - Administer medications as ordered  - Instruct and encourage patient and family to use good hand hygiene technique  - Identify and instruct in appropriate isolation precautions for identified infection/condition  11/17/2022 1049 by Daren Carlisle RN  Outcome: Completed  11/17/2022 1001 by Daren Carlisle RN  Outcome: Progressing  11/17/2022 1001 by Daren Carlisle RN  Outcome: Progressing  Goal: Absence of fever/infection during neutropenic period  Description: INTERVENTIONS:  - Monitor WBC    11/17/2022 1049 by Daren Carlisle RN  Outcome: Completed  11/17/2022 1001 by Daren Carlisle RN  Outcome: Progressing  11/17/2022 1001 by Daren Carlisle RN  Outcome: Progressing     Problem: SAFETY ADULT  Goal: Patient will remain free of falls  Description: INTERVENTIONS:  - Educate patient/family on patient safety including physical limitations  - Instruct patient to call for assistance with activity   - Consult OT/PT to assist with strengthening/mobility   - Keep Call bell within reach  - Keep bed low and locked with side rails adjusted as appropriate  - Keep care items and personal belongings within reach  - Initiate and maintain comfort rounds  - Make Fall Risk Sign visible to staff  - Offer Toileting in advance of need  - Obtain necessary fall risk management equipment  - Apply yellow socks and bracelet for high fall risk patients  - Consider moving patient to room near nurses station  11/17/2022 1049 by Elijah Arnett RN  Outcome: Completed  11/17/2022 1001 by Elijah Arnett RN  Outcome: Progressing  11/17/2022 1001 by Elijah Arnett RN  Outcome: Progressing  Goal: Maintain or return to baseline ADL function  Description: INTERVENTIONS:  -  Assess patient's ability to carry out ADLs; assess patient's baseline for ADL function and identify physical deficits which impact ability to perform ADLs (bathing, care of mouth/teeth, toileting, grooming, dressing, etc )  - Assess/evaluate cause of self-care deficits   - Assess range of motion  - Assess patient's mobility; develop plan if impaired  - Assess patient's need for assistive devices and provide as appropriate  - Encourage maximum independence but intervene and supervise when necessary  - Involve family in performance of ADLs  - Assess for home care needs following discharge   - Consider OT consult to assist with ADL evaluation and planning for discharge  - Provide patient education as appropriate  11/17/2022 1049 by Elijah Arnett RN  Outcome: Completed  11/17/2022 1001 by Elijah Arnett RN  Outcome: Progressing  11/17/2022 1001 by Elijah Arnett RN  Outcome: Progressing  Goal: Maintains/Returns to pre admission functional level  Description: INTERVENTIONS:  - Perform BMAT or MOVE assessment daily    - Set and communicate daily mobility goal to care team and patient/family/caregiver  - Collaborate with rehabilitation services on mobility goals if consulted  - Perform Range of Motion   - Reposition patient   - Dangle patient  - Stand patient   - Ambulate patient   - Out of bed to chair   - Out of bed for meals   - Out of bed for toileting  - Record patient progress and toleration of activity level   11/17/2022 1049 by Wolf Nick RN  Outcome: Completed  11/17/2022 1001 by Wolf Nick RN  Outcome: Progressing  11/17/2022 1001 by Wolf Nick RN  Outcome: Progressing     Problem: Knowledge Deficit  Goal: Patient/family/caregiver demonstrates understanding of disease process, treatment plan, medications, and discharge instructions  Description: Complete learning assessment and assess knowledge base    Interventions:  - Provide teaching at level of understanding  - Provide teaching via preferred learning methods  11/17/2022 1049 by Wolf Nick RN  Outcome: Completed  11/17/2022 1001 by Wolf Nick RN  Outcome: Progressing  11/17/2022 1001 by Wolf Nick RN  Outcome: Progressing     Problem: DISCHARGE PLANNING  Goal: Discharge to home or other facility with appropriate resources  Description: INTERVENTIONS:  - Identify barriers to discharge w/patient and caregiver  - Arrange for needed discharge resources and transportation as appropriate  - Identify discharge learning needs (meds, wound care, etc )  - Arrange for interpretive services to assist at discharge as needed  - Refer to Case Management Department for coordinating discharge planning if the patient needs post-hospital services based on physician/advanced practitioner order or complex needs related to functional status, cognitive ability, or social support system  11/17/2022 1049 by Wolf Nick RN  Outcome: Completed  11/17/2022 1001 by Danielle Amelia Petersen RN  Outcome: Progressing  2022 1001 by Matt Garcia RN  Outcome: Progressing     Problem: POSTPARTUM  Goal: Experiences normal postpartum course  Description: INTERVENTIONS:  - Monitor maternal vital signs  - Assess uterine involution and lochia  2022 1049 by Matt Garcia RN  Outcome: Completed  2022 1001 by Matt Garcia RN  Outcome: Progressing  2022 1001 by Matt Garcia RN  Outcome: Progressing  Goal: Appropriate maternal -  bonding  Description: INTERVENTIONS:  - Identify family support  - Assess for appropriate maternal/infant bonding   -Encourage maternal/infant bonding opportunities  - Referral to  or  as needed  2022 1049 by Matt Garcia RN  Outcome: Completed  2022 100 by Matt Garcia RN  Outcome: Progressing  2022 1001 by Matt Garcia RN  Outcome: Progressing  Goal: Establishment of infant feeding pattern  Description: INTERVENTIONS:  - Assess breast/bottle feeding  - Refer to lactation as needed  2022 1049 by Matt Garcia RN  Outcome: Completed  2022 1001 by Matt Garcia RN  Outcome: Progressing  2022 1001 by Matt Garcia RN  Outcome: Progressing  Goal: Incision(s), wounds(s) or drain site(s) healing without S/S of infection  Description: INTERVENTIONS  - Assess and document dressing, incision, wound bed, drain sites and surrounding tissue  - Provide patient and family education  - Perform skin care/dressing changes  2022 1049 by Matt Garcia RN  Outcome: Completed  2022 1001 by Matt Garcia RN  Outcome: Progressing  2022 1001 by Matt Garcia RN  Outcome: Progressing

## 2022-11-22 ENCOUNTER — TELEPHONE (OUTPATIENT)
Dept: OBGYN CLINIC | Facility: CLINIC | Age: 37
End: 2022-11-22

## 2022-11-22 LAB — PLACENTA IN STORAGE: NORMAL

## 2022-12-08 ENCOUNTER — POSTPARTUM VISIT (OUTPATIENT)
Dept: OBGYN CLINIC | Facility: CLINIC | Age: 37
End: 2022-12-08

## 2022-12-08 VITALS — SYSTOLIC BLOOD PRESSURE: 122 MMHG | RESPIRATION RATE: 16 BRPM | HEART RATE: 88 BPM | DIASTOLIC BLOOD PRESSURE: 76 MMHG

## 2022-12-08 DIAGNOSIS — Z30.2 REQUEST FOR STERILIZATION: ICD-10-CM

## 2022-12-08 PROBLEM — Z3A.39 39 WEEKS GESTATION OF PREGNANCY: Status: RESOLVED | Noted: 2022-04-21 | Resolved: 2022-12-08

## 2022-12-08 PROBLEM — O09.529 AMA (ADVANCED MATERNAL AGE) MULTIGRAVIDA 35+: Status: RESOLVED | Noted: 2022-05-16 | Resolved: 2022-12-08

## 2022-12-08 PROBLEM — O99.210 OBESITY AFFECTING PREGNANCY: Status: RESOLVED | Noted: 2022-05-16 | Resolved: 2022-12-08

## 2022-12-08 PROBLEM — Z34.93 PRENATAL CARE IN THIRD TRIMESTER: Status: RESOLVED | Noted: 2022-06-09 | Resolved: 2022-12-08

## 2022-12-08 PROBLEM — O09.899 HISTORY OF PRETERM DELIVERY, CURRENTLY PREGNANT: Status: RESOLVED | Noted: 2022-05-16 | Resolved: 2022-12-08

## 2022-12-08 NOTE — PROGRESS NOTES
Margaret Webb is a 44 y o  y o  female [id number] who presents for a postpartum visit  She is 3 weeks postpartum following a spontaneous vaginal delivery on 2022  She had a baby girl that weighed 7 pounds 12 2 ounces, Apgars were 9 and 9  Her pregnancy was complicated by AMA, obesity, history of  delivery, fetal macrosomia, shoulder dystocia    I have fully reviewed the prenatal and intrapartum course  The delivery was at [facility] and 1 day gestational weeks  Outcome: spontaneous vaginal delivery  Anesthesia: epidural  Postpartum course has been uneventful  Baby's course has been uneventful  Baby is feeding by bottle - Similac Sensitive RS  Bleeding no bleeding  Bowel function is normal  Bladder function is normal  Patient is not sexually active  Contraception method is abstinence  Postpartum depression screening: negative  Depression Screening Follow-up Plan: Patient's depression screening was negative a score of 2  Denies depression  The following portions of the patient's history were reviewed and updated as appropriate: allergies, current medications, past family history, past medical history, past social history, past surgical history and problem list     Review of Systems  Pertinent items are noted in HPI       Objective     /76 (BP Location: Right arm, Patient Position: Sitting, Cuff Size: Standard)   Pulse 88   Resp 16   LMP 02/15/2022     General:   appears stated age, cooperative, alert normal mood and affect   Neck: Neck: normal, supple,trachea midline, no masses   Heart: regular rate and rhythm, S1, S2 normal, no murmur, click, rub or gallop   Lungs: clear to auscultation bilaterally   Breasts: Deferred denies concerns   Abdomen: soft, non-tender, without masses or organomegaly   Vulva: Deferred denies concerns   Vagina:    Urethra:    Cervix:    Uterus:    Adnexa:      Assessment/Plan     3wk postpartum exam  Pap smear not done at today's visit    Last Pap HPV was 5/12/2022 and was negative  1  Contraception: abstinence until tubal- MA 31 signed 9/1/22  Pt does not want any more children, desires permanent sterilization  Surgical consent signed today by Dr Paty Sims    Offered contraception to bridge, pt declines, wants to abstain, has condoms if needed  2   Annual due 5/12/2022  3  Follow up in: for H&P for surgery  or as needed

## 2022-12-30 ENCOUNTER — OFFICE VISIT (OUTPATIENT)
Dept: FAMILY MEDICINE CLINIC | Facility: CLINIC | Age: 37
End: 2022-12-30

## 2022-12-30 VITALS
WEIGHT: 208 LBS | HEART RATE: 77 BPM | SYSTOLIC BLOOD PRESSURE: 110 MMHG | OXYGEN SATURATION: 99 % | BODY MASS INDEX: 40.84 KG/M2 | TEMPERATURE: 98 F | HEIGHT: 60 IN | DIASTOLIC BLOOD PRESSURE: 78 MMHG

## 2022-12-30 DIAGNOSIS — Z00.00 ANNUAL PHYSICAL EXAM: ICD-10-CM

## 2022-12-30 DIAGNOSIS — G43.909 MIGRAINE WITHOUT STATUS MIGRAINOSUS, NOT INTRACTABLE, UNSPECIFIED MIGRAINE TYPE: Primary | ICD-10-CM

## 2022-12-30 RX ORDER — SUMATRIPTAN 25 MG/1
25 TABLET, FILM COATED ORAL ONCE AS NEEDED
Qty: 30 TABLET | Refills: 2 | Status: SHIPPED | OUTPATIENT
Start: 2022-12-30

## 2022-12-30 NOTE — PROGRESS NOTES
[address] [facility] [place]    NAME: Ashely Lauren  AGE: 44 y o  SEX: female  : 1985     DATE: 2022     Assessment and Plan:     Problem List Items Addressed This Visit        Cardiovascular and Mediastinum    Migraine - Primary    Relevant Medications    SUMAtriptan (Imitrex) 25 mg tablet   Other Visit Diagnoses     Annual physical exam        Relevant Orders    Hepatitis C antibody    Lipid panel    Basic metabolic panel        Nutrition Assessment and Intervention:     New Nutrition Prescription completed with patient      Physical Activity Assessment and Intervention:    Physical Activity Prescription completed with patient      Emotional and Mental Well-being, Sleep, Connectedness Assessment and Intervention:    Sleep/stress assessment performed      Tobacco and Toxic Substance Assessment and Intervention:     Tobacco use screening performed    Alcohol and drug use screening performed      Immunizations and preventive care screenings were discussed with patient today  Appropriate education was printed on patient's after visit summary  Counseling:  Alcohol/drug use: discussed moderation in alcohol intake, the recommendations for healthy alcohol use, and avoidance of illicit drug use  Dental Health: discussed importance of regular tooth brushing, flossing, and dental visits  Injury prevention: discussed safety/seat belts, safety helmets, smoke detectors, carbon dioxide detectors, and smoking near bedding or upholstery  Sexual health: discussed sexually transmitted diseases, partner selection, use of condoms, avoidance of unintended pregnancy, and contraceptive alternatives  · Exercise: the importance of regular exercise/physical activity was discussed  Recommend exercise 3-5 times per week for at least 30 minutes  BMI Counseling: Body mass index is 40 62 kg/m²   The BMI is above normal  Nutrition recommendations include encouraging healthy choices of fruits and vegetables  Exercise recommendations include moderate physical activity 150 minutes/week and strength training exercises  Rationale for BMI follow-up plan is due to patient being overweight or obese  Return in about 1 year (around 2023) for Annual physical      Chief Complaint:     Chief Complaint   Patient presents with   • Physical Exam      History of Present Illness:     Adult Annual Physical   Patient here for a comprehensive physical exam  The patient reports problems - has had 1 migraine since delivery  Diet and Physical Activity  · Diet/Nutrition: well balanced diet, limited junk food and consuming 3-5 servings of fruits/vegetables daily  · Exercise: walking  Depression Screening  PHQ-2/9 Depression Screening         General Health  · Sleep: managing given infant in home  · Hearing: normal - bilateral   · Vision: no vision problems  · Dental: regular dental visits and brushes teeth twice daily  /GYN Health  · Last menstrual period: started yesterday  · Contraceptive method: abstinence  · History of STDs?: no      Review of Systems:     Review of Systems   Constitutional: Negative for chills and fever  HENT: Negative for ear pain and sore throat  Eyes: Negative for pain and visual disturbance  Respiratory: Negative for cough and shortness of breath  Cardiovascular: Negative for chest pain and palpitations  Gastrointestinal: Negative for abdominal pain and vomiting  Genitourinary: Positive for vaginal bleeding  Negative for dysuria and hematuria  Musculoskeletal: Negative for arthralgias and back pain  Skin: Negative for color change and rash  Neurological: Positive for headaches  Negative for seizures and syncope  Psychiatric/Behavioral: Negative for dysphoric mood        Past Medical History:     Past Medical History:   Diagnosis Date   • Gallbladder attack    • History of  delivery, currently pregnant 2022    3 spontaneous  deliveries, earliest at 22 weeks 35 weeks  S/p cervical length surveillance through [facility]   • Kidney stones    • Migraines    • Varicella    • Von Willebrand disease     patient is carrier      Past Surgical History:     Past Surgical History:   Procedure Laterality Date   • LAPAROSCOPIC CHOLECYSTECTOMY     • LITHOTRIPSY     • TONSILLECTOMY        Social History:     Social History     Socioeconomic History   • Marital status: Single     Spouse name: Not on file   • Number of children: Not on file   • Years of education: Not on file   • Highest education level: Not on file   Occupational History   • Not on file   Tobacco Use   • Smoking status: Never   • Smokeless tobacco: Never   Vaping Use   • Vaping Use: Never used   Substance and Sexual Activity   • Alcohol use: No   • Drug use: No   • Sexual activity: Yes     Partners: Male     Birth control/protection: None   Other Topics Concern   • Not on file   Social History Narrative   • Not on file     Social Determinants of Health     Financial Resource Strain: Low Risk    • Difficulty of Paying Living Expenses: Not hard at all   Food Insecurity: No Food Insecurity   • Worried About Running Out of Food in the Last Year: Never true   • Ran Out of Food in the Last Year: Never true   Transportation Needs: No Transportation Needs   • Lack of Transportation (Medical): No   • Lack of Transportation (Non-Medical):  No   Physical Activity: Not on file   Stress: Not on file   Social Connections: Not on file   Intimate Partner Violence: Not At Risk   • Fear of Current or Ex-Partner: No   • Emotionally Abused: No   • Physically Abused: No   • Sexually Abused: No   Housing Stability: Low Risk    • Unable to Pay for Housing in the Last Year: No   • Number of Places Lived in the Last Year: 1   • Unstable Housing in the Last Year: No      Family History:     Family History   Problem Relation Age of Onset   • No Known Problems Mother    • No Known Problems Father    • No Known Problems Sister    • No Known Problems Brother    • No Known Problems Daughter    • No Known Problems Son    • No Known Problems Maternal Grandfather    • No Known Problems Sister    • No Known Problems Sister    • No Known Problems Daughter       Current Medications:     Current Outpatient Medications   Medication Sig Dispense Refill   • SUMAtriptan (Imitrex) 25 mg tablet Take 1 tablet (25 mg total) by mouth once as needed for migraine (If it does not work over 2h can take a second tablet) for up to 1 dose 30 tablet 2     No current facility-administered medications for this visit  Allergies: Allergies   Allergen Reactions   • Latex Anaphylaxis, Hives and Anxiety     Category: Allergy; Annotation - [id number]: HIVES      Physical Exam:     /78   Pulse 77   Temp 98 °F (36 7 °C)   Ht 5' (1 524 m)   Wt 94 3 kg (208 lb)   SpO2 99%   BMI 40 62 kg/m²     Physical Exam  Vitals and nursing note reviewed  Constitutional:       General: She is not in acute distress  Appearance: She is well-developed  HENT:      Head: Normocephalic and atraumatic  Eyes:      Conjunctiva/sclera: Conjunctivae normal    Cardiovascular:      Rate and Rhythm: Normal rate and regular rhythm  Heart sounds: No murmur heard  Pulmonary:      Effort: Pulmonary effort is normal  No respiratory distress  Breath sounds: Normal breath sounds  Abdominal:      Palpations: Abdomen is soft  Tenderness: There is no abdominal tenderness  Musculoskeletal:         General: No swelling  Cervical back: Neck supple  Skin:     General: Skin is warm and dry  Capillary Refill: Capillary refill takes less than 2 seconds  Neurological:      Mental Status: She is alert     Psychiatric:         Mood and Affect: Mood normal           Eleazar Dance, MD   [facility]

## 2023-01-04 ENCOUNTER — DOCUMENTATION (OUTPATIENT)
Dept: OTHER | Facility: HOSPITAL | Age: 38
End: 2023-01-04

## 2023-01-04 NOTE — QUICK NOTE
[facility] Surgical Case Review  Date Reviewed: 1/4/23  Reviewed by: Nolan Pierce  Case request: Exam under anesthesia, laparoscopic bilateral salpingectomy  Indication: Desire for sterilization  Surgical Consent: 12/8/22  MA30/31: 9/1/22    Case request approved: Approved    Comments:  None    Sparkle Samson MD  R-4, OBGYN  01/04/23

## 2023-01-09 ENCOUNTER — PREP FOR PROCEDURE (OUTPATIENT)
Dept: OBGYN CLINIC | Facility: CLINIC | Age: 38
End: 2023-01-09

## 2023-01-09 DIAGNOSIS — Z01.818 PREOP TESTING: ICD-10-CM

## 2023-01-09 DIAGNOSIS — Z30.2 STERILIZATION: Primary | ICD-10-CM

## 2023-01-09 NOTE — PROGRESS NOTES
Spoke with pt H&P on 1/23/23 with Sloane Flores I, Sx on 1/31/23 with Lucy  Pt was informed pre admission testing may be required prior to Sx, pt was in agreement and understood instructions

## 2023-01-23 ENCOUNTER — OFFICE VISIT (OUTPATIENT)
Dept: OBGYN CLINIC | Facility: CLINIC | Age: 38
End: 2023-01-23

## 2023-01-23 VITALS — RESPIRATION RATE: 18 BRPM | BODY MASS INDEX: 41.23 KG/M2 | WEIGHT: 210 LBS | HEIGHT: 60 IN

## 2023-01-23 DIAGNOSIS — Z30.2 REQUEST FOR STERILIZATION: ICD-10-CM

## 2023-01-23 DIAGNOSIS — Z01.818 PRE-OP EXAMINATION: Primary | ICD-10-CM

## 2023-01-23 NOTE — H&P (VIEW-ONLY)
H&P Exam  Lucina Senior 44 y o  female MRN: [id number]  Unit/Bed#:  Encounter: [id number]      HPI:  Isabel Paez is a 44 y o  [id number] female who will be undergoing EUA, dx laparoscopy, and bilateral salpingectomy on 23  LMP: 22  Contraception: none  Last pap smear: 2022, NILM, HPV neg    Blood use during admission if needed: yes  CPR during admission if needed: yes    OB History    Para Term  AB Living   12 6 4 2 6 5   SAB IAB Ectopic Multiple Live Births   4 2 0 0 5      # Outcome Date GA Lbr Juan/2nd Weight Sex Delivery Anes PTL Lv   12 Term 22 39w1d / 00:55 3520 g (7 lb 12 2 oz) F Vag-Spont EPI  GILL      Complications: Shoulder Dystocia   11 Term 20 37w2d / 12:12 2995 g (6 lb 9 6 oz) M Vag-Spont EPI N GILL   10  17 22w0d   M  None Y FD   9 Term 03/27/15    M Vag-Spont EPI  GILL   8 Term 05    F Vag-Spont None  GILL   7  10/18/02 35w0d   F Vag-Spont None  GILL   6 SAB            5 SAB            4 SAB            3 SAB            2 IAB            1 IAB                Review of Systems   Constitutional: Negative for chills and fever  Respiratory: Negative for cough, shortness of breath and wheezing  Cardiovascular: Negative for chest pain  Gastrointestinal: Negative for abdominal pain, nausea and vomiting  Genitourinary: Negative for dysuria, hematuria, urgency, vaginal bleeding and vaginal discharge  Neurological: Negative for dizziness, weakness, light-headedness and headaches         Historical Information   Past Medical History:   Diagnosis Date   • Gallbladder attack    • History of  delivery, currently pregnant 2022    3 spontaneous  deliveries, earliest at 22 weeks 35 weeks  S/p cervical length surveillance through [facility]   • Kidney stones    • Migraines    • Varicella    • Von Willebrand disease carrier     patient is carrier     Past Surgical History:   Procedure Laterality Date   • LAPAROSCOPIC CHOLECYSTECTOMY     • LITHOTRIPSY     • TONSILLECTOMY       Social History   Social History     Substance and Sexual Activity   Alcohol Use No     Social History     Substance and Sexual Activity   Drug Use No     Social History     Tobacco Use   Smoking Status Never   Smokeless Tobacco Never     Family History: denies history of breast, ovarian, uterine, cervical cancer  Meds/Allergies    (Not in a hospital admission)       Allergies   Allergen Reactions   • Latex Anaphylaxis, Hives and Anxiety     Category: Allergy; Annotation - [id number]: HIVES       OBJECTIVE:    Vitals: Resp  rate 18, height 5' (1 524 m), weight 95 3 kg (210 lb), not currently breastfeeding  Body mass index is 41 01 kg/m²  Physical Exam  Vitals reviewed  Constitutional:       Appearance: Normal appearance  Cardiovascular:      Rate and Rhythm: Normal rate and regular rhythm  Pulmonary:      Effort: Pulmonary effort is normal  No respiratory distress  Abdominal:      General: There is no distension  Palpations: Abdomen is soft  Tenderness: There is no abdominal tenderness  Genitourinary:     Labia:         Right: No rash, tenderness or lesion  Left: No rash, tenderness or lesion  Vagina: No vaginal discharge, erythema or tenderness  Cervix: No cervical motion tenderness, discharge or friability  Musculoskeletal:         General: No swelling or tenderness  Skin:     General: Skin is warm and dry  Neurological:      Mental Status: She is alert and oriented to person, place, and time  Assessment/Plan     ASSESSMENT:  [id number] female who is scheduled for bilateral salpingectomy on 1/31/23      PLAN:    NPO after midnight, Hibiclens given to patient   No surgical prophylaxis  RTO in 2-3 weeks for post op check    Riley Mcgowan MD  1/23/2023  11:05 AM

## 2023-01-23 NOTE — PROGRESS NOTES
H&P Exam  Lucina Lynne 44 y o  female MRN: [id number]  Unit/Bed#:  Encounter: [id number]      HPI:  Fatmata Perrin is a 44 y o  [id number] female who will be undergoing EUA, dx laparoscopy, and bilateral salpingectomy on 23  LMP: 22  Contraception: none  Last pap smear: 2022, NILM, HPV neg    Blood use during admission if needed: yes  CPR during admission if needed: yes    OB History    Para Term  AB Living   12 6 4 2 6 5   SAB IAB Ectopic Multiple Live Births   4 2 0 0 5      # Outcome Date GA Lbr Juan/2nd Weight Sex Delivery Anes PTL Lv   12 Term 22 39w1d / 00:55 3520 g (7 lb 12 2 oz) F Vag-Spont EPI  GILL      Complications: Shoulder Dystocia   11 Term 20 37w2d / 12:12 2995 g (6 lb 9 6 oz) M Vag-Spont EPI N GILL   10  17 22w0d   M  None Y FD   9 Term 03/27/15    M Vag-Spont EPI  GILL   8 Term 05    F Vag-Spont None  GILL   7  10/18/02 35w0d   F Vag-Spont None  GILL   6 SAB            5 SAB            4 SAB            3 SAB            2 IAB            1 IAB                Review of Systems   Constitutional: Negative for chills and fever  Respiratory: Negative for cough, shortness of breath and wheezing  Cardiovascular: Negative for chest pain  Gastrointestinal: Negative for abdominal pain, nausea and vomiting  Genitourinary: Negative for dysuria, hematuria, urgency, vaginal bleeding and vaginal discharge  Neurological: Negative for dizziness, weakness, light-headedness and headaches         Historical Information   Past Medical History:   Diagnosis Date   • Gallbladder attack    • History of  delivery, currently pregnant 2022    3 spontaneous  deliveries, earliest at 22 weeks 35 weeks  S/p cervical length surveillance through [facility]   • Kidney stones    • Migraines    • Varicella    • Von Willebrand disease carrier     patient is carrier     Past Surgical History:   Procedure Laterality Date   • LAPAROSCOPIC CHOLECYSTECTOMY     • LITHOTRIPSY     • TONSILLECTOMY       Social History   Social History     Substance and Sexual Activity   Alcohol Use No     Social History     Substance and Sexual Activity   Drug Use No     Social History     Tobacco Use   Smoking Status Never   Smokeless Tobacco Never     Family History: denies history of breast, ovarian, uterine, cervical cancer  Meds/Allergies    (Not in a hospital admission)       Allergies   Allergen Reactions   • Latex Anaphylaxis, Hives and Anxiety     Category: Allergy; Annotation - [id number]: HIVES       OBJECTIVE:    Vitals: Resp  rate 18, height 5' (1 524 m), weight 95 3 kg (210 lb), not currently breastfeeding  Body mass index is 41 01 kg/m²  Physical Exam  Vitals reviewed  Constitutional:       Appearance: Normal appearance  Cardiovascular:      Rate and Rhythm: Normal rate and regular rhythm  Pulmonary:      Effort: Pulmonary effort is normal  No respiratory distress  Abdominal:      General: There is no distension  Palpations: Abdomen is soft  Tenderness: There is no abdominal tenderness  Genitourinary:     Labia:         Right: No rash, tenderness or lesion  Left: No rash, tenderness or lesion  Vagina: No vaginal discharge, erythema or tenderness  Cervix: No cervical motion tenderness, discharge or friability  Musculoskeletal:         General: No swelling or tenderness  Skin:     General: Skin is warm and dry  Neurological:      Mental Status: She is alert and oriented to person, place, and time  Assessment/Plan     ASSESSMENT:  89HG [id number] female who is scheduled for bilateral salpingectomy on 1/31/23      PLAN:    NPO after midnight, Hibiclens given to patient   No surgical prophylaxis  RTO in 2-3 weeks for post op check    Bernadette Omer MD  1/23/2023  11:05 AM

## 2023-01-26 ENCOUNTER — ANESTHESIA EVENT (OUTPATIENT)
Dept: PERIOP | Facility: HOSPITAL | Age: 38
End: 2023-01-26

## 2023-01-30 NOTE — PRE-PROCEDURE INSTRUCTIONS
Pre-Surgery Instructions:   Medication Instructions   • SUMAtriptan (Imitrex) 25 mg tablet Uses PRN- OK to take day of surgery   Instructed to avoid all ASA and OTC Vit/Supp 1 week prior to surgery and to avoid NSAIDs 3 days prior to surgery per anesthesia instructions  Tylenol ok to take prn  Pre procedure instructions reviewed verbalizes understanding  NPO after MN  Bathing reviewed  Morning meds with water

## 2023-01-31 ENCOUNTER — ANESTHESIA (OUTPATIENT)
Dept: ANESTHESIOLOGY | Facility: HOSPITAL | Age: 38
End: 2023-01-31

## 2023-01-31 ENCOUNTER — ANESTHESIA (OUTPATIENT)
Dept: PERIOP | Facility: HOSPITAL | Age: 38
End: 2023-01-31

## 2023-01-31 ENCOUNTER — HOSPITAL ENCOUNTER (OUTPATIENT)
Facility: HOSPITAL | Age: 38
Setting detail: OUTPATIENT SURGERY
Discharge: HOME | End: 2023-01-31
Attending: OBSTETRICS & GYNECOLOGY | Admitting: OBSTETRICS & GYNECOLOGY

## 2023-01-31 ENCOUNTER — ANESTHESIA EVENT (OUTPATIENT)
Dept: ANESTHESIOLOGY | Facility: HOSPITAL | Age: 38
End: 2023-01-31

## 2023-01-31 VITALS
HEIGHT: 60 IN | WEIGHT: 205 LBS | TEMPERATURE: 97.9 F | DIASTOLIC BLOOD PRESSURE: 73 MMHG | BODY MASS INDEX: 40.25 KG/M2 | HEART RATE: 90 BPM | SYSTOLIC BLOOD PRESSURE: 140 MMHG | RESPIRATION RATE: 18 BRPM | OXYGEN SATURATION: 98 %

## 2023-01-31 DIAGNOSIS — Z30.2 STERILIZATION: ICD-10-CM

## 2023-01-31 DIAGNOSIS — Z90.79 STATUS POST BILATERAL SALPINGECTOMY: Primary | ICD-10-CM

## 2023-01-31 PROBLEM — IMO0002 FETAL MACROSOMIA: Status: RESOLVED | Noted: 2022-09-28 | Resolved: 2023-01-31

## 2023-01-31 PROBLEM — O36.60X0 FETAL MACROSOMIA: Status: RESOLVED | Noted: 2022-09-28 | Resolved: 2023-01-31

## 2023-01-31 LAB
EXT PREGNANCY TEST URINE: NEGATIVE
EXT. CONTROL: NORMAL

## 2023-01-31 RX ORDER — OXYCODONE HYDROCHLORIDE 5 MG/1
10 TABLET ORAL EVERY 4 HOURS PRN
Status: DISCONTINUED | OUTPATIENT
Start: 2023-01-31 | End: 2023-01-31 | Stop reason: HOSPADM

## 2023-01-31 RX ORDER — FENTANYL CITRATE 50 UG/ML
INJECTION, SOLUTION INTRAMUSCULAR; INTRAVENOUS AS NEEDED
Status: DISCONTINUED | OUTPATIENT
Start: 2023-01-31 | End: 2023-01-31

## 2023-01-31 RX ORDER — IBUPROFEN 600 MG/1
600 TABLET ORAL EVERY 6 HOURS PRN
Status: DISCONTINUED | OUTPATIENT
Start: 2023-01-31 | End: 2023-01-31 | Stop reason: HOSPADM

## 2023-01-31 RX ORDER — OXYCODONE HYDROCHLORIDE 5 MG/1
5 TABLET ORAL EVERY 4 HOURS PRN
Qty: 5 TABLET | Refills: 0 | Status: SHIPPED | OUTPATIENT
Start: 2023-01-31 | End: 2023-02-10

## 2023-01-31 RX ORDER — LIDOCAINE HYDROCHLORIDE 10 MG/ML
INJECTION, SOLUTION EPIDURAL; INFILTRATION; INTRACAUDAL; PERINEURAL AS NEEDED
Status: DISCONTINUED | OUTPATIENT
Start: 2023-01-31 | End: 2023-01-31

## 2023-01-31 RX ORDER — SODIUM CHLORIDE, SODIUM LACTATE, POTASSIUM CHLORIDE, CALCIUM CHLORIDE 600; 310; 30; 20 MG/100ML; MG/100ML; MG/100ML; MG/100ML
50 INJECTION, SOLUTION INTRAVENOUS CONTINUOUS
Status: DISCONTINUED | OUTPATIENT
Start: 2023-01-31 | End: 2023-01-31 | Stop reason: HOSPADM

## 2023-01-31 RX ORDER — MAGNESIUM HYDROXIDE 1200 MG/15ML
LIQUID ORAL AS NEEDED
Status: DISCONTINUED | OUTPATIENT
Start: 2023-01-31 | End: 2023-01-31 | Stop reason: HOSPADM

## 2023-01-31 RX ORDER — SODIUM CHLORIDE, SODIUM LACTATE, POTASSIUM CHLORIDE, CALCIUM CHLORIDE 600; 310; 30; 20 MG/100ML; MG/100ML; MG/100ML; MG/100ML
INJECTION, SOLUTION INTRAVENOUS CONTINUOUS PRN
Status: DISCONTINUED | OUTPATIENT
Start: 2023-01-31 | End: 2023-01-31

## 2023-01-31 RX ORDER — ALBUTEROL SULFATE 90 UG/1
AEROSOL, METERED RESPIRATORY (INHALATION) AS NEEDED
Status: DISCONTINUED | OUTPATIENT
Start: 2023-01-31 | End: 2023-01-31

## 2023-01-31 RX ORDER — OXYCODONE HYDROCHLORIDE 5 MG/1
5 TABLET ORAL EVERY 4 HOURS PRN
Status: DISCONTINUED | OUTPATIENT
Start: 2023-01-31 | End: 2023-01-31 | Stop reason: HOSPADM

## 2023-01-31 RX ORDER — PROMETHAZINE HYDROCHLORIDE 25 MG/ML
25 INJECTION, SOLUTION INTRAMUSCULAR; INTRAVENOUS ONCE AS NEEDED
Status: DISCONTINUED | OUTPATIENT
Start: 2023-01-31 | End: 2023-01-31 | Stop reason: HOSPADM

## 2023-01-31 RX ORDER — KETOROLAC TROMETHAMINE 30 MG/ML
INJECTION, SOLUTION INTRAMUSCULAR; INTRAVENOUS AS NEEDED
Status: DISCONTINUED | OUTPATIENT
Start: 2023-01-31 | End: 2023-01-31

## 2023-01-31 RX ORDER — DEXAMETHASONE SODIUM PHOSPHATE 10 MG/ML
INJECTION, SOLUTION INTRAMUSCULAR; INTRAVENOUS AS NEEDED
Status: DISCONTINUED | OUTPATIENT
Start: 2023-01-31 | End: 2023-01-31

## 2023-01-31 RX ORDER — ONDANSETRON 2 MG/ML
INJECTION INTRAMUSCULAR; INTRAVENOUS AS NEEDED
Status: DISCONTINUED | OUTPATIENT
Start: 2023-01-31 | End: 2023-01-31

## 2023-01-31 RX ORDER — SENNOSIDES 8.6 MG
650 CAPSULE ORAL EVERY 6 HOURS PRN
Qty: 30 TABLET | Refills: 0
Start: 2023-01-31

## 2023-01-31 RX ORDER — MIDAZOLAM HYDROCHLORIDE 2 MG/2ML
INJECTION, SOLUTION INTRAMUSCULAR; INTRAVENOUS AS NEEDED
Status: DISCONTINUED | OUTPATIENT
Start: 2023-01-31 | End: 2023-01-31

## 2023-01-31 RX ORDER — HYDROMORPHONE HCL/PF 1 MG/ML
0.4 SYRINGE (ML) INJECTION
Status: DISCONTINUED | OUTPATIENT
Start: 2023-01-31 | End: 2023-01-31 | Stop reason: HOSPADM

## 2023-01-31 RX ORDER — BUPIVACAINE HYDROCHLORIDE 2.5 MG/ML
INJECTION, SOLUTION EPIDURAL; INFILTRATION; INTRACAUDAL AS NEEDED
Status: DISCONTINUED | OUTPATIENT
Start: 2023-01-31 | End: 2023-01-31 | Stop reason: HOSPADM

## 2023-01-31 RX ORDER — ONDANSETRON 2 MG/ML
4 INJECTION INTRAMUSCULAR; INTRAVENOUS EVERY 6 HOURS PRN
Status: DISCONTINUED | OUTPATIENT
Start: 2023-01-31 | End: 2023-01-31 | Stop reason: HOSPADM

## 2023-01-31 RX ORDER — ROCURONIUM BROMIDE 10 MG/ML
INJECTION, SOLUTION INTRAVENOUS AS NEEDED
Status: DISCONTINUED | OUTPATIENT
Start: 2023-01-31 | End: 2023-01-31

## 2023-01-31 RX ORDER — IBUPROFEN 600 MG/1
600 TABLET ORAL EVERY 6 HOURS PRN
Qty: 30 TABLET | Refills: 0
Start: 2023-01-31

## 2023-01-31 RX ORDER — PROPOFOL 10 MG/ML
INJECTION, EMULSION INTRAVENOUS AS NEEDED
Status: DISCONTINUED | OUTPATIENT
Start: 2023-01-31 | End: 2023-01-31

## 2023-01-31 RX ORDER — ACETAMINOPHEN 325 MG/1
975 TABLET ORAL EVERY 6 HOURS PRN
Status: DISCONTINUED | OUTPATIENT
Start: 2023-01-31 | End: 2023-01-31 | Stop reason: HOSPADM

## 2023-01-31 RX ADMIN — KETOROLAC TROMETHAMINE 15 MG: 30 INJECTION, SOLUTION INTRAMUSCULAR at 13:45

## 2023-01-31 RX ADMIN — DEXAMETHASONE SODIUM PHOSPHATE 10 MG: 10 INJECTION, SOLUTION INTRAMUSCULAR; INTRAVENOUS at 13:08

## 2023-01-31 RX ADMIN — FENTANYL CITRATE 100 MCG: 50 INJECTION, SOLUTION INTRAMUSCULAR; INTRAVENOUS at 12:50

## 2023-01-31 RX ADMIN — ALBUTEROL SULFATE 10 PUFF: 90 AEROSOL, METERED RESPIRATORY (INHALATION) at 13:03

## 2023-01-31 RX ADMIN — ONDANSETRON 4 MG: 2 INJECTION INTRAMUSCULAR; INTRAVENOUS at 13:45

## 2023-01-31 RX ADMIN — PROPOFOL 20 MG: 10 INJECTION, EMULSION INTRAVENOUS at 12:51

## 2023-01-31 RX ADMIN — LIDOCAINE HYDROCHLORIDE 100 MG: 10 INJECTION, SOLUTION EPIDURAL; INFILTRATION; INTRACAUDAL; PERINEURAL at 12:50

## 2023-01-31 RX ADMIN — ROCURONIUM BROMIDE 50 MG: 50 INJECTION INTRAVENOUS at 12:52

## 2023-01-31 RX ADMIN — PROPOFOL 20 MG: 10 INJECTION, EMULSION INTRAVENOUS at 12:52

## 2023-01-31 RX ADMIN — PROPOFOL 20 MG: 10 INJECTION, EMULSION INTRAVENOUS at 12:50

## 2023-01-31 RX ADMIN — MIDAZOLAM 2 MG: 1 INJECTION INTRAMUSCULAR; INTRAVENOUS at 12:48

## 2023-01-31 RX ADMIN — SODIUM CHLORIDE, SODIUM LACTATE, POTASSIUM CHLORIDE, AND CALCIUM CHLORIDE: .6; .31; .03; .02 INJECTION, SOLUTION INTRAVENOUS at 12:25

## 2023-01-31 NOTE — ANESTHESIA POSTPROCEDURE EVALUATION
Post-Op Assessment Note    CV Status:  Stable  Pain Score: 0    Pain management: adequate     Mental Status:  Alert and awake   Hydration Status:  Euvolemic   PONV Controlled:  Controlled   Airway Patency:  Patent      Post Op Vitals Reviewed: Yes      Staff: Anesthesiologist         No notable events documented      /65 (01/31/23 1404)    Temp 97 9 °F (36 6 °C) (01/31/23 1404)    Pulse 82 (01/31/23 1404)   Resp 18 (01/31/23 1404)    SpO2 99 % (01/31/23 1404)

## 2023-01-31 NOTE — OP NOTE
OPERATIVE REPORT  PATIENT NAME: Renea Dubois    :  1985  MRN: [id number]  Pt Location:  OR ROOM 10    SURGERY DATE: 2023    Surgeon(s) and Role:     * Ace Dai MD - Primary     * Barbara Chu MD - Assisting    Preop Diagnosis:  Sterilization [Z30 2]    Post-Op Diagnosis Codes:     * Sterilization [Z30 2]    Procedure(s):  Bilateral - SALPINGECTOMY  LAPAROSCOPIC  Examine under Anesthesia  EXAM UNDER ANESTHESIA (EUA)    Specimen(s):  ID Type Source Tests Collected by Time Destination   1 : bilateral fallopian tubes Tissue Fallopian Tubes, Bilateral TISSUE EXAM Ace Dai MD 2023 1342        Estimated Blood Loss:   5 mL    Drains:  None    Anesthesia Type:   General ETT    Operative Indications:  Sterilization [Z30 2]    Operative Findings:  • Normal external female genitalia  • Grossly normal intestines and omentum  • Uterus and bilateral tubes and ovaries normal in appearance  • No evidence of endometriosis with no powder burn lesions noted    Brief History: All risks, benefits, and alternatives to the procedure were discussed with the patient and she had the opportunity to ask questions  The risk of regret of procedure was also addressed with the patient and options for LARC was discussed  Patient expressed desire to continue with tubal sterilization  Informed consent was obtained  Operative Technique:  Patient was taken to the operating room where correct patient and correct procedure were confirmed  General endotracheal anesthesia (GET) was administered with difficulty, and the patient was positioned on the OR table in the dorsal lithotomy position  All pressure points were padded, and a richard hugger was placed to maintain control of core body temperature  The patient was prepped and draped in the usual sterile fashion with chloroprep on the abdomen and chlorhexidine prep on the vagina and perineum  A time out was performed      A straight catheter was introduced into the bladder, which was drained of 100 mL of clear yellow urine  A Hutchinson retractor and Cathy retractor were inserted into the vagina and used to visualize the anterior lip of the cervix which was then grasped with a single toothed tenaculum  The [place] retractor was removed  A Zumi uterine manipulator was inserted through the cervix and into the uterus, and the tenaculum was removed  The Hutchinson retractor was removed  Sterile gloves were then exchanged, and attention was turned to the abdomen  After injection of local, a 5 mm horizontal incision was made at the inferior edge of the umbilicus for introduction of a 5 mm trocar  The trocar was introduced under direct visualization  Pneumoperitoneum was established to a maximum of 15 mmHg  The entire abdomen and pelvis was inspected in a systematic fashion, and there was no evidence of entry site injury or injury to bowel, bladder, vasculature, or other structures  Two additional port sites were selected in the left and right lower abdominal quadrants approximately 2 cm superior and medial to the anterior superior iliac spines  After injection of local, a 5 mm incision was made for introduction of a 5 mm trocar under direct visualization at each site  Attention was then turned to the pelvis  Patient was placed in Trendelenburg, and the uterus was elevated to visualize the fallopian tubes  There was noted to be grossly normal uterus and bilateral tubes and ovaries  A [place] grasper was inserted and used to visualize the fimbriated ends of the tubes  The right fallopian tube was grasped at its fimbriated end with an atraumatic bowel grasper and elevated to visualize the mesosalpinx  The Enseal device was used to ligate along the mesosalpinx, working proximally and taking care to avoid ovarian vasculature  Approximately 2 cm from the cornua, the Enseal device was used to amputate fallopian tube    The specimen was then withdrawn from the abdominal cavity and sent for pathology  Attention was then turned to the contralateral tube which was amputated in similar fashion  Good hemostasis was confirmed following salpingectomy  Pneumoperitoneum was allowed to escape  Adequate hemostasis was visualized  The inferior trocars were removed under direct visualization  The laparoscope was withdrawn from the abdomen, followed by its trocar sleeve at the umbilicus  Skin incisions were closed with 4-0 Monocryl and surgical glue  Attention was turned to the vagina  A Hutchinson retractor was inserted, and the uterine manipulator was withdrawn  Good hemostasis was confirmed at the tenaculum puncture sites  Hutchinson retractor was then removed from the vagina  At the conclusion of the procedure, all needle, sponge, and instrument counts were noted to be correct x2  Patient tolerated the procedure well and was transferred to PACU in stable condition prior to discharge with follow up in 1-2 weeks  Dr Prince Garcia was present and participated in the entire case      Complications:   None apparent    Patient Disposition:  PACU         SIGNATURE: Nicole Mcclure MD  DATE: January 31, 2023  TIME: 1:53 PM

## 2023-01-31 NOTE — ANESTHESIA PREPROCEDURE EVALUATION
Procedure:  SALPINGECTOMY, LAPAROSCOPIC, Examine under Anesthesia (Bilateral: Abdomen)  EXAM UNDER ANESTHESIA (EUA) (Vagina )    Relevant Problems   CARDIO   (+) Migraine      NEURO/PSYCH   (+) Migraine      vWD carrier - no bleeding issues  Appropriately NPO  Denies smoking, etoh and illicit drug use  No prior anesthetic complications    Negative preg test 1/31/2023  Lab Results   Component Value Date    WBC 8 52 11/15/2022    HGB 10 6 (L) 11/15/2022    HCT 32 4 (L) 11/15/2022    MCV 92 11/15/2022     11/15/2022     Lab Results   Component Value Date    SODIUM 135 11/15/2022    K 4 0 11/15/2022     11/15/2022    CO2 21 11/15/2022    BUN 10 11/15/2022    CREATININE 0 45 (L) 11/15/2022    GLUC 83 11/15/2022    CALCIUM 8 7 11/15/2022           Physical Exam    Airway    Mallampati score: II  TM Distance: >3 FB  Neck ROM: full     Dental       Cardiovascular  Rhythm: regular, Rate: normal,     Pulmonary  Breath sounds clear to auscultation,     Other Findings        Anesthesia Plan  ASA Score- 3     Anesthesia Type- general with ASA Monitors  Additional Monitors:   Airway Plan: ETT  Comment: Risks/benefits and alternatives discussed with patient including possible PONV, sore throat, damage to teeth/lips/gums/esophagus, and possibility of rare anesthetic and surgical emergencies including but not limited to heart attack, stroke, and/or death  All questions were answered          Plan Factors-Exercise tolerance (METS): >4 METS  Chart reviewed  Existing labs reviewed  Patient summary reviewed  Patient is not a current smoker  Obstructive sleep apnea risk education given perioperatively  Induction- intravenous  Postoperative Plan- Plan for postoperative opioid use  Planned trial extubation    Informed Consent- Anesthetic plan and risks discussed with patient

## 2023-01-31 NOTE — INTERVAL H&P NOTE
H&P reviewed  After examining the patient I find no changes in the patients condition since the H&P had been written  Vitals:    01/31/23 1150   BP: 121/92   Pulse: 73   Resp: 18   Temp: 98 1 °F (36 7 °C)   SpO2: 99%     Sun Bonner MD  OB/GYN  1/31/2023  12:34 PM

## 2023-02-15 ENCOUNTER — TELEPHONE (OUTPATIENT)
Dept: OBGYN CLINIC | Facility: CLINIC | Age: 38
End: 2023-02-15

## 2023-02-15 NOTE — TELEPHONE ENCOUNTER
Attempted to call, and left a message to have the patient call the office  Office number provided in message  I incorrectly scheduled the patient, and she needs to reschedule her appt with ob resident

## 2023-03-08 PROBLEM — Z30.2 REQUEST FOR STERILIZATION: Status: RESOLVED | Noted: 2018-05-31 | Resolved: 2023-03-08

## 2023-03-09 ENCOUNTER — OFFICE VISIT (OUTPATIENT)
Dept: OBGYN CLINIC | Facility: CLINIC | Age: 38
End: 2023-03-09

## 2023-03-09 VITALS
HEART RATE: 80 BPM | SYSTOLIC BLOOD PRESSURE: 120 MMHG | DIASTOLIC BLOOD PRESSURE: 74 MMHG | RESPIRATION RATE: 16 BRPM | BODY MASS INDEX: 41.79 KG/M2 | WEIGHT: 214 LBS

## 2023-03-09 DIAGNOSIS — Z90.79 STATUS POST BILATERAL SALPINGECTOMY: Primary | ICD-10-CM

## 2023-03-09 DIAGNOSIS — Z48.89 ENCOUNTER FOR POSTOPERATIVE CARE: ICD-10-CM

## 2023-03-09 NOTE — PROGRESS NOTES
[address] 44 y o  SUBJECTIVE    CC:  "Postoperative"    HPI: Severiano Ibanez is a 44 y o  [id number] presenting today for postoperative visit  She underwent laparoscopic bilateral salpingectomy on 1/31/23 for desire for sterilization  Procedure was uncomplicated  Findings were notable for normal anatomy  Pathology showed benign fallopian tubes  She has been recovering well  Her discharge is non-existant, her pain is resolved, and she is feeling well overall  She had no voice for several days after intubation    Her last pap smear was 05/12/2022, NILM, HPV neg    OBJECTIVE  Vitals:    03/09/23 1527   BP: 120/74   BP Location: Right arm   Patient Position: Sitting   Cuff Size: Standard   Pulse: 80   Resp: 16   Weight: 97 1 kg (214 lb)         Physical Exam  Constitutional:       General: She is not in acute distress  Appearance: She is obese  She is not ill-appearing or toxic-appearing  HENT:      Head: Normocephalic and atraumatic  Eyes:      Conjunctiva/sclera: Conjunctivae normal    Cardiovascular:      Pulses: Normal pulses  Pulmonary:      Effort: Pulmonary effort is normal  No respiratory distress  Abdominal:      General: There is no distension  Palpations: Abdomen is soft  Tenderness: There is no abdominal tenderness  There is no guarding or rebound  Comments: Laparoscopic incisions well healed, right sided incision with small palpable remnant of monocryl under skin  Not protruding, not painful, not erythematous   Musculoskeletal:         General: Normal range of motion  Cervical back: Normal range of motion  Right lower leg: No edema  Left lower leg: No edema  Neurological:      General: No focal deficit present  Mental Status: She is alert  Mental status is at baseline  Skin:     General: Skin is warm and dry     Psychiatric:         Mood and Affect: Mood normal          Behavior: Behavior normal  ASSESSMENT    Lucina Recio is a 44 y o  y o  [id number] now 5 weeks postoperatively from an uncomplicated laparoscopic bilateral salpingectomy for desire for sterilization, with specimen showing benign findings, doing well  Small remnant of monocryl suture under right sided incision, no reason for removal at this time  PLAN  Reviewed pathology of specimen and postoperative recovery expectations  Discussed that suture will continue to dissolve and heal, continue to monitor  Return to care for annual exam in January 2024  Next pap smear due 2027 per guidelines    All questions were answered & patient expressed understanding      Patient was discussed with Dr Lino Torres MD  OBGYN R-4  3/9/2023

## 2023-05-05 ENCOUNTER — OFFICE VISIT (OUTPATIENT)
Dept: FAMILY MEDICINE CLINIC | Facility: CLINIC | Age: 38
End: 2023-05-05

## 2023-05-05 VITALS
WEIGHT: 222.4 LBS | HEART RATE: 87 BPM | TEMPERATURE: 97.9 F | BODY MASS INDEX: 43.66 KG/M2 | DIASTOLIC BLOOD PRESSURE: 80 MMHG | HEIGHT: 60 IN | OXYGEN SATURATION: 98 % | SYSTOLIC BLOOD PRESSURE: 118 MMHG

## 2023-05-05 DIAGNOSIS — R29.898 WEAKNESS OF LEFT HIP: Primary | ICD-10-CM

## 2023-05-05 DIAGNOSIS — M54.50 LUMBAR BACK PAIN: ICD-10-CM

## 2023-05-05 NOTE — PROGRESS NOTES
Family Medicine Follow-Up Office Visit  Lucina Lund 44 y o  female   MRN: [id number] : 1985  ENCOUNTER: 2023 3:19 PM    Assessment and Plan   Weakness of left hip  Patient with almost 1 year of left hip weakness associated with lumbar pain without sciatica worsens status post pregnancy and weight gain  No red flag symptoms  The differential diagnosis includes herniated disc/radiculopathy, musculoskeletal pain, psoas strain, other neuromuscular etiologies  Plan:    Repeat Lumbar Xray for initial evaluation  Referral to comprehensive spine program for PT  Follow up in 6 week if no significant improvement of symptoms likely to require lumbar MRI      Chief Complaint     Chief Complaint   Patient presents with    Extremity Weakness     Left hip and leg weakness since she's had a baby 51/2 months ago        History of Present Illness   Lucina Lund is a 44y o -year-old female with past medical history of migraines, status post bilateral salpingo-oophorectomy, and cholecystectomy, who presents today for evaluation for left-sided hip weakness  She notes that this started when she was 6 to 7 months pregnant and her baby is now 11 and half months old without improvement of symptoms status post pregnancy  She notes that she has had these symptoms with prior pregnancies however it had previously self resolved  She denies any left leg or hip pain no radiation down the leg or numbness or tingling  She does get bilateral low back pain in certain positions or activities however this is not reproducible with palpation  She denies any urine or fecal incontinence  She is able to walk without difficulty however she notes that she struggles with limping after sitting for long periods  She does note that she has struggled with weight gain more following this pregnancy  Review of Systems   Review of Systems   Constitutional: Negative for chills and fever     HENT: Negative for ear pain and sore throat  Eyes: Negative for pain and visual disturbance  Respiratory: Negative for cough and shortness of breath  Cardiovascular: Negative for chest pain and palpitations  Gastrointestinal: Negative for abdominal pain, constipation, diarrhea, nausea and vomiting  Genitourinary: Negative for dysuria and hematuria  Musculoskeletal: Positive for back pain (lumbar)  Negative for arthralgias  Skin: Negative for color change and rash  Neurological: Positive for weakness and headaches  Negative for seizures, syncope and numbness  Psychiatric/Behavioral: Negative for dysphoric mood  Active Problem List     Patient Active Problem List   Diagnosis    Migraine    Status post bilateral salpingectomy    Weakness of left hip    Lumbar back pain       Past Medical History, Past Surgical History, Family History, and Social History were reviewed and updated today as appropriate  Objective   /80 (BP Location: Right arm, Patient Position: Sitting, Cuff Size: Large)   Pulse 87   Temp 97 9 °F (36 6 °C) (Tympanic)   Ht 5' (1 524 m)   Wt 101 kg (222 lb 6 4 oz)   SpO2 98%   BMI 43 43 kg/m²     Physical Exam  Vitals reviewed  Constitutional:       Appearance: She is well-developed  HENT:      Head: Normocephalic and atraumatic  Right Ear: External ear normal       Left Ear: External ear normal    Eyes:      General: No scleral icterus  Conjunctiva/sclera: Conjunctivae normal    Cardiovascular:      Rate and Rhythm: Normal rate and regular rhythm  Heart sounds: Normal heart sounds  Pulmonary:      Effort: Pulmonary effort is normal  No respiratory distress  Breath sounds: Normal breath sounds  No wheezing  Musculoskeletal:      Comments: Patient without midline tenderness of the lumbar spine  Without paraspinal tenderness  Right sided hypertonicity greater than left  No SI joint tenderness bilaterally  No piriformis tenderness bilaterally    Strength with hip flexion on the left side is 4/5 as well as 4/5 with extension  Otherwise muscular strength of the lower extremities is intact bilaterally  Sensation and reflexes intact bilaterally  Straight leg raise positive on the R     Skin:     General: Skin is warm and dry  Neurological:      Mental Status: She is alert and oriented to person, place, and time     Psychiatric:         Mood and Affect: Mood normal          Behavior: Behavior normal            Pertinent Laboratory/Diagnostic Studies:  Lab Results   Component Value Date    BUN 10 11/15/2022    CREATININE 0 45 (L) 11/15/2022    CALCIUM 8 7 11/15/2022    K 4 0 11/15/2022    CO2 21 11/15/2022     11/15/2022     Lab Results   Component Value Date    ALT 9 11/15/2022    AST 14 11/15/2022    ALKPHOS 114 (H) 11/15/2022       Lab Results   Component Value Date    WBC 8 52 11/15/2022    HGB 10 6 (L) 11/15/2022    HCT 32 4 (L) 11/15/2022    MCV 92 11/15/2022     11/15/2022       No results found for: TSH    No results found for: CHOL  No results found for: TRIG  No results found for: HDL  No results found for: [address]  Lab Results   Component Value Date    HGBA1C 5 3 11/15/2022       Results for orders placed or performed during the hospital encounter of 01/31/23   POCT pregnancy, urine   Result Value Ref Range    EXT Preg Test, Ur Negative Negative    Control Valid Valid   Tissue Exam   Result Value Ref Range    Case Report       Surgical Pathology Report                         Case: [id number]                                   Authorizing Provider:  Alyssa Arthur MD     Collected:           01/31/2023 1342              Ordering Location:     [address] [place] [address]      Received:            01/31/2023 [address] [place] [address] Operating Room                                                      Pathologist:           Henrietta Connor MD                                                     Specimen:    Fallopian Tubes, "Bilateral, bilateral fallopian tubes                                      Final Diagnosis       A  Fallopian tubes, bilateral:  - Benign fallopian tubes with focal acute inflammation, complete cross sections identified  Additional Information       All reported additional testing was performed with appropriately reactive controls  These tests were developed and their performance characteristics determined by [facility] or appropriate performing facility, though some tests may be performed on tissues which have not been validated for performance characteristics (such as staining performed on alcohol exposed cell blocks and decalcified tissues)  Results should be interpreted with caution and in the context of the patients clinical condition  These tests may not be cleared or approved by the [place]  Food and Drug Administration, though the FDA has determined that such clearance or approval is not necessary  These tests are used for clinical purposes and they should not be regarded as investigational or for research  This laboratory has been approved by [place], designated as a high-complexity laboratory and is qualified to perform these tests  [facility] Description       A  The specimen is received in formalin, labeled with the patient's name and hospital number, and is designated \"bilateral fallopian tubes\"  The specimen consists of bilateral unoriented fallopian tubes  The larger fallopian tube is received in multiple pieces and measures a total length of 7 1 cm x 0 5 cm in diameter, and the smaller fallopian tube measures 5 7 cm in length by 0 6 cm in diameter  They have purple-red, smooth and glistening serosal surfaces  A few paratubal cysts are attached to the larger fallopian tube, the largest measuring 0 7 cm in greatest dimension  The fimbriae grossly appear normal in configuration  Sectioning reveals tan mucosa and patent lumens    Representative sections of the larger " fallopian tube are submitted in cassette A1, and representative sections of the smaller fallopian tube are submitted in cassette A2  Note: The estimated total formalin fixation time based upon information provided by the submitting clinician and the standard processing schedule is under 72 hours  RRavotti         Orders Placed This Encounter   Procedures    XR spine lumbar minimum 4 views non injury    Ambulatory Referral to Comprehensive Spine Program         Current Medications     Current Outpatient Medications   Medication Sig Dispense Refill    SUMAtriptan (Imitrex) 25 mg tablet Take 1 tablet (25 mg total) by mouth once as needed for migraine (If it does not work over 2h can take a second tablet) for up to 1 dose 30 tablet 2    acetaminophen (TYLENOL) 650 mg CR tablet Take 1 tablet (650 mg total) by mouth every 6 (six) hours as needed for mild pain or moderate pain (Patient not taking: Reported on 5/5/2023) 30 tablet 0    ibuprofen (MOTRIN) 600 mg tablet Take 1 tablet (600 mg total) by mouth every 6 (six) hours as needed for mild pain or moderate pain (Patient not taking: Reported on 5/5/2023) 30 tablet 0     No current facility-administered medications for this visit  ALLERGIES:  Allergies   Allergen Reactions    Latex Anaphylaxis, Hives and Anxiety     Category: Allergy;  Annotation - [id number]: HIVES       Health Maintenance     Health Maintenance   Topic Date Due    Hepatitis C Screening  Never done    COVID-19 Vaccine (1) Never done    Influenza Vaccine (Season Ended) 09/01/2023    BMI: Followup Plan  12/30/2023    Annual Physical  12/30/2023    Depression Screening  01/23/2024    BMI: Adult  05/05/2024    Cervical Cancer Screening  05/12/2027    DTaP,Tdap,and Td Vaccines (4 - Td or Tdap) 09/01/2032    HIV Screening  Completed    Pneumococcal Vaccine: Pediatrics (0 to 5 Years) and At-Risk Patients (6 to 59 Years)  Aged Out    HIB Vaccine  Aged Out    IPV Vaccine  Aged Dick Schein Hepatitis A Vaccine  Aged Out    Meningococcal ACWY Vaccine  Aged Out    HPV Vaccine  Aged Out     Immunization History   Administered Date(s) Administered    INFLUENZA 11/25/2014    MMR 06/24/2020    Tdap 01/20/2015, 05/12/2020, 09/01/2022         Nga Dwyer DO   [address]  5/5/2023  3:19 PM    Parts of this note were dictated using [employer] dictation software and may have sounds-like errors due to variation in pronunciation

## 2023-05-05 NOTE — ASSESSMENT & PLAN NOTE
Patient with almost 1 year of left hip weakness associated with lumbar pain without sciatica worsens status post pregnancy and weight gain  No red flag symptoms  The differential diagnosis includes herniated disc/radiculopathy, musculoskeletal pain, psoas strain, other neuromuscular etiologies      Plan:    Repeat Lumbar Xray for initial evaluation  Referral to comprehensive spine program for PT  Follow up in 6 week if no significant improvement of symptoms likely to require lumbar MRI

## 2023-05-08 ENCOUNTER — TELEPHONE (OUTPATIENT)
Dept: PHYSICAL THERAPY | Facility: OTHER | Age: 38
End: 2023-05-08

## 2023-05-08 NOTE — TELEPHONE ENCOUNTER
Call placed to the patient per [facility] referral     Voice message left for patient to call back  Phone number and hours of business provided  This is the 1st attempt to reach the patient  Will defer per protocol  Cheek-To-Nose Interpolation Flap Text: A decision was made to reconstruct the defect utilizing an interpolation axial flap and a staged reconstruction.  A telfa template was made of the defect.  This telfa template was then used to outline the Cheek-To-Nose Interpolation flap.  The donor area for the pedicle flap was then injected with anesthesia.  The flap was excised through the skin and subcutaneous tissue down to the layer of the underlying musculature.  The interpolation flap was carefully excised within this deep plane to maintain its blood supply.  The edges of the donor site were undermined.   The donor site was closed in a primary fashion.  The pedicle was then rotated into position and sutured.  Once the tube was sutured into place, adequate blood supply was confirmed with blanching and refill.  The pedicle was then wrapped with xeroform gauze and dressed appropriately with a telfa and gauze bandage to ensure continued blood supply and protect the attached pedicle.

## 2023-05-10 ENCOUNTER — NURSE TRIAGE (OUTPATIENT)
Dept: PHYSICAL THERAPY | Facility: OTHER | Age: 38
End: 2023-05-10

## 2023-05-10 DIAGNOSIS — M54.50 ACUTE EXACERBATION OF CHRONIC LOW BACK PAIN: Primary | ICD-10-CM

## 2023-05-10 DIAGNOSIS — G89.29 ACUTE EXACERBATION OF CHRONIC LOW BACK PAIN: Primary | ICD-10-CM

## 2023-05-10 DIAGNOSIS — R29.898 WEAKNESS OF LEFT LEG: ICD-10-CM

## 2023-05-10 NOTE — TELEPHONE ENCOUNTER
"  Additional Information  • Negative: Has the patient had unexplained weight loss? • Negative: Does the patient have a fever? • Negative: Is the patient experiencing blood in sputum? • Negative: Is the patient experiencing urine retention? • Negative: Has the patient experienced major trauma? (fall from height, high speed collision, direct blow to spine) and is also experiencing nausea, light-headedness, or loss of consciousness? • Negative: Is the patient experiencing acute drop foot or paralysis? • Affirmative: Is this a chronic condition? Protocols used: [facility] COMPREHENSIVE SPINE PROGRAM PROTOCOL    Nurse briefly reviewed patients complaints with her  Patient seen by PCP and stated she had some back and leg issues after giving birth 18 yrs ago  Intermittent over time, but does not think it \"ever really healed or went away\"  Patient is c/o leg weakness that \"bothers me more than the back pain\"  Patient worried about caring for 11 month old  Assured her she can discuss specifics at time of evaluation to assist in creating an appropriate plan of care  Nurse completed triage and NO RF s/s present  Referral entered for the [facility] site and contact info given to her as well  Patients information was sent to the preferred site and pt made aware clerical would be calling to schedule appointment  Nurse encouraged her to call site if she does not hear from clerical beforehand  Patient agreed  Patient did not voice any ffurther questions or concerns at this time  Patient is aware current complaints, relevant dx, and treatment/options will be discussed at time of consult  All information regarding plan to be evaluated by the therapist was reviewed  Patient is in agreement care path discussed  Patient very appreciative of CB and referral placement for evaluation with Advanced Spine Therapist      Nurse wished her well and referral closed    "

## 2023-05-10 NOTE — TELEPHONE ENCOUNTER
"    Additional Information  • Negative: Is this related to a work injury? • Negative: Is this related to an MVA? • Negative: Are you currently recieving homecare services? Background - Initial Assessment  Clinical complaint: B/L Low Back pain , started 1 5 moths ago  No numbness or tingling, no radiation but she does feel weakness on left-leg and hip  Patient states the pain is constant \"pretty much everyday\"  NKI  Seen by FM only  Patient described it as \"nagging pain\"  Date of onset: 1 5 months ago  Frequency of pain: constant  Quality of pain: \"nagging pain\"      Protocols used: [facility] COMPREHENSIVE SPINE PROGRAM PROTOCOL      "

## 2023-05-26 ENCOUNTER — TELEPHONE (OUTPATIENT)
Dept: PHYSICAL THERAPY | Facility: OTHER | Age: 38
End: 2023-05-26

## 2023-05-26 NOTE — TELEPHONE ENCOUNTER
"Nurse placed call as a follow-up on recent triage completed with  [facility]    Patient answered where reason for call was reviewed  Patient stated she did receive a call from the PT site for scheduling her evaluation  Her work schedule would not allow her to take the next day appointment  Per the patient -Clerical staff would call her back with openings at [facility] sites  Patient did not hear back and has left several messages for the staff  Patient was pleasant and very understanding throughout this discussion  Patient voiced a positive experience and overall satisfaction with the program, but finding it difficult to [facility] around her work schedule\"  Patient continues to be interested in scheduling an appointment with PT  Nurse offered to send a message to the site on her behalf and also recommended patient f/u with a call  Reminded her this is a Holiday w/e and the office may be closed on Monday  Patient appreciative of f/u call and ability to provide feedback  Nurse wished her well and the chart would be noted  Patient to CB next week if necessary    "

## 2023-06-23 ENCOUNTER — OFFICE VISIT (OUTPATIENT)
Dept: FAMILY MEDICINE CLINIC | Facility: CLINIC | Age: 38
End: 2023-06-23
Payer: MEDICARE

## 2023-06-23 VITALS
BODY MASS INDEX: 43.98 KG/M2 | HEART RATE: 72 BPM | OXYGEN SATURATION: 98 % | WEIGHT: 224 LBS | TEMPERATURE: 98.6 F | DIASTOLIC BLOOD PRESSURE: 60 MMHG | HEIGHT: 60 IN | RESPIRATION RATE: 18 BRPM | SYSTOLIC BLOOD PRESSURE: 110 MMHG

## 2023-06-23 DIAGNOSIS — M54.50 CHRONIC BILATERAL LOW BACK PAIN WITHOUT SCIATICA: ICD-10-CM

## 2023-06-23 DIAGNOSIS — G89.29 CHRONIC BILATERAL LOW BACK PAIN WITHOUT SCIATICA: ICD-10-CM

## 2023-06-23 DIAGNOSIS — E66.01 CLASS 3 SEVERE OBESITY DUE TO EXCESS CALORIES WITH BODY MASS INDEX (BMI) OF 40.0 TO 44.9 IN ADULT, UNSPECIFIED WHETHER SERIOUS COMORBIDITY PRESENT (HCC): Primary | ICD-10-CM

## 2023-06-23 NOTE — PATIENT INSTRUCTIONS
Weight Management   WHAT YOU NEED TO KNOW:   Being overweight increases your risk of health conditions such as heart disease, high blood pressure, type 2 diabetes, and certain types of cancer  It can also increase your risk for osteoarthritis, sleep apnea, and other respiratory problems  Aim for a slow, steady weight loss  Even a small amount of weight loss can lower your risk of health problems  DISCHARGE INSTRUCTIONS:   How to lose weight safely:  A safe and healthy way to lose weight is to eat fewer calories and get regular exercise  You can lose up about 1 pound a week by decreasing the number of calories you eat by 500 calories each day  You can decrease calories by eating smaller portion sizes or by cutting out high-calorie foods  Read labels to find out how many calories are in the foods you eat  You can also burn calories with exercise such as walking, swimming, or biking  You will be more likely to keep weight off if you make these changes part of your lifestyle  Exercise at least 30 minutes per day on most days of the week  You can also fit in more physical activity by taking the stairs instead of the elevator or parking farther away from stores  Ask your healthcare provider about the best exercise plan for you  Healthy meal plan for weight management:  A healthy meal plan includes a variety of foods, contains fewer calories, and helps you stay healthy  A healthy meal plan includes the following:     Eat whole-grain foods more often  A healthy meal plan should contain fiber  Fiber is the part of grains, fruits, and vegetables that is not broken down by your body  Whole-grain foods are healthy and provide extra fiber in your diet  Some examples of whole-grain foods are whole-wheat breads and pastas, oatmeal, brown rice, and bulgur  Eat a variety of vegetables every day  Include dark, leafy greens such as spinach, kale, nikolas greens, and mustard greens   Eat yellow and orange vegetables such as carrots, sweet potatoes, and winter squash  Eat a variety of fruits every day  Choose fresh or canned fruit (canned in its own juice or light syrup) instead of juice  Fruit juice has very little or no fiber  Eat low-fat dairy foods  Drink fat-free (skim) milk or 1% milk  Eat fat-free yogurt and low-fat cottage cheese  Try low-fat cheeses such as mozzarella and other reduced-fat cheeses  Choose meat and other protein foods that are low in fat  Choose beans or other legumes such as split peas or lentils  Choose fish, skinless poultry (chicken or turkey), or lean cuts of red meat (beef or pork)  Before you cook meat or poultry, cut off any visible fat  Use less fat and oil  Try baking foods instead of frying them  Add less fat, such as margarine, sour cream, regular salad dressing, and mayonnaise to foods  Eat fewer high-fat foods  Some examples of high-fat foods include french fries, doughnuts, ice cream, and cakes  Eat fewer sweets  Limit foods and drinks that are high in sugar  This includes candy, cookies, regular soda, and sweetened drinks  Ways to decrease calories:   Eat smaller portions  Use a small plate with smaller servings  Do not eat second helpings  When you eat at a restaurant, ask for a box and place half of your meal in the box before you eat  Share an entrée with someone else  Replace high-calorie snacks with healthy, low-calorie snacks  Choose fresh fruit, vegetables, fat-free rice cakes, or air-popped popcorn instead of potato chips, nuts, or chocolate  Choose water or calorie-free drinks instead of soda or sweetened drinks  Do not shop for groceries when you are hungry  You may be more likely to make unhealthy food choices  Take a grocery list of healthy foods and shop after you have eaten  Eat regular meals  Do not skip meals  Skipping meals can lead to overeating later in the day  This can make it harder for you to lose weight   Eat a healthy snack in place of a meal if you do not have time to eat a regular meal  Talk with a dietitian to help you create a meal plan and schedule that is right for you  Other things to consider as you try to lose weight:   Be aware of situations that may give you the urge to overeat, such as eating while watching television  Find ways to avoid these situations  For example, read a book, go for a walk, or do crafts  Meet with a weight loss support group or friends who are also trying to lose weight  This may help you stay motivated to continue working on your weight loss goals  © Copyright [employer] 2022 Information is for End User's use only and may not be sold, redistributed or otherwise used for commercial purposes  The above information is an  only  It is not intended as medical advice for individual conditions or treatments  Talk to your doctor, nurse or pharmacist before following any medical regimen to see if it is safe and effective for you

## 2023-06-23 NOTE — PROGRESS NOTES
Name: Delonte Taylor      : 1985      MRN: [id number]  Encounter Provider: Darin Bangura MD  Encounter Date: 2023   Encounter department: [address] [place] [address]  Class 3 severe obesity due to excess calories with body mass index (BMI) of 40 0 to 44 9 in adult, unspecified whether serious comorbidity present [facility])  -     Ambulatory Referral to Weight Management; Future  -     TSH, 3rd generation with Free T4 reflex; Future    2  Chronic bilateral low back pain without sciatica  -     Ambulatory Referral to Weight Management; Future        Body mass index is 43 75 kg/m²  Recommend incorporating a more whole foods plant-predominant diet along with decreasing consumption of red meats and processed foods  Per AHA guidelines, recommend moderate-vigorous intensity exercise for 30 minutes a day for 5 days a week or a total of 150 min/week  Check TSH for completion  Weight management referral    Subjective      Lower back pain for the past 7months, worse on the left side does not radiate, aggravated when she stands up from sitting position, difficult to stand straight  Occasionally takes NSAIDS to relive the pain and get through work however this does not completely remove the pain  Has noted that she has gained about 75 lbs over the years with her last two pregnancies and has had difficulty losing the weight  The back pain has steadily increased over this same period of time  Its been difficult getting an appointment with the physical therapy  Planning to get an appointment  No other complaints  Review of Systems   Constitutional: Negative for chills and fever  HENT: Negative for ear pain and sore throat  Eyes: Negative for pain and visual disturbance  Respiratory: Negative for cough and shortness of breath  Cardiovascular: Negative for chest pain and palpitations  Gastrointestinal: Negative for abdominal pain and vomiting  Genitourinary: Negative for dysuria and hematuria  Musculoskeletal: Positive for back pain  Negative for arthralgias  Skin: Negative for color change and rash  Neurological: Negative for seizures and syncope  Current Outpatient Medications on File Prior to Visit   Medication Sig   • SUMAtriptan (Imitrex) 25 mg tablet Take 1 tablet (25 mg total) by mouth once as needed for migraine (If it does not work over 2h can take a second tablet) for up to 1 dose   • acetaminophen (TYLENOL) 650 mg CR tablet Take 1 tablet (650 mg total) by mouth every 6 (six) hours as needed for mild pain or moderate pain (Patient not taking: Reported on 5/5/2023)   • ibuprofen (MOTRIN) 600 mg tablet Take 1 tablet (600 mg total) by mouth every 6 (six) hours as needed for mild pain or moderate pain (Patient not taking: Reported on 5/5/2023)       Objective     /60 (BP Location: Left arm, Patient Position: Sitting, Cuff Size: Large)   Pulse 72   Temp 98 6 °F (37 °C) (Tympanic)   Resp 18   Ht 5' (1 524 m)   Wt 102 kg (224 lb)   SpO2 98%   BMI 43 75 kg/m²     Physical Exam  Constitutional:       General: She is not in acute distress  Appearance: Normal appearance  She is obese  She is not ill-appearing  HENT:      Head: Normocephalic and atraumatic  Right Ear: Tympanic membrane and external ear normal       Left Ear: Tympanic membrane and external ear normal       Mouth/Throat:      Mouth: Mucous membranes are moist       Pharynx: Oropharynx is clear  No oropharyngeal exudate  Eyes:      Extraocular Movements: Extraocular movements intact  Pupils: Pupils are equal, round, and reactive to light  Cardiovascular:      Rate and Rhythm: Normal rate and regular rhythm  Pulses: Normal pulses  Heart sounds: Normal heart sounds  Pulmonary:      Effort: Pulmonary effort is normal       Breath sounds: Normal breath sounds  No wheezing, rhonchi or rales  Abdominal:      General: Abdomen is flat   Bowel sounds are normal  There is no distension  Palpations: Abdomen is soft  Tenderness: There is no abdominal tenderness  Skin:     General: Skin is warm and dry  Capillary Refill: Capillary refill takes less than 2 seconds  Neurological:      General: No focal deficit present  Mental Status: She is alert and oriented to person, place, and time     Psychiatric:         Mood and Affect: Mood normal          Behavior: Behavior normal        Matias Obrien MD

## 2023-07-22 ENCOUNTER — APPOINTMENT (OUTPATIENT)
Dept: LAB | Facility: HOSPITAL | Age: 38
End: 2023-07-22
Payer: MEDICARE

## 2023-07-22 DIAGNOSIS — O99.213 OBESITY AFFECTING PREGNANCY IN THIRD TRIMESTER: ICD-10-CM

## 2023-07-22 DIAGNOSIS — Z00.00 ANNUAL PHYSICAL EXAM: ICD-10-CM

## 2023-07-22 DIAGNOSIS — E66.01 CLASS 3 SEVERE OBESITY DUE TO EXCESS CALORIES WITH BODY MASS INDEX (BMI) OF 40.0 TO 44.9 IN ADULT, UNSPECIFIED WHETHER SERIOUS COMORBIDITY PRESENT (HCC): ICD-10-CM

## 2023-07-22 DIAGNOSIS — Z34.93 PRENATAL CARE IN THIRD TRIMESTER: ICD-10-CM

## 2023-07-22 LAB
ANION GAP SERPL CALCULATED.3IONS-SCNC: 8 MMOL/L
BUN SERPL-MCNC: 12 MG/DL (ref 5–25)
CALCIUM SERPL-MCNC: 9.2 MG/DL (ref 8.4–10.2)
CHLORIDE SERPL-SCNC: 104 MMOL/L (ref 96–108)
CHOLEST SERPL-MCNC: 143 MG/DL
CO2 SERPL-SCNC: 26 MMOL/L (ref 21–32)
CREAT SERPL-MCNC: 0.55 MG/DL (ref 0.6–1.3)
GFR SERPL CREATININE-BSD FRML MDRD: 119 ML/MIN/1.73SQ M
GLUCOSE P FAST SERPL-MCNC: 103 MG/DL (ref 65–99)
HDLC SERPL-MCNC: 37 MG/DL
LDLC SERPL CALC-MCNC: 92 MG/DL (ref 0–100)
NONHDLC SERPL-MCNC: 106 MG/DL
POTASSIUM SERPL-SCNC: 3.8 MMOL/L (ref 3.5–5.3)
SODIUM SERPL-SCNC: 138 MMOL/L (ref 135–147)
TRIGL SERPL-MCNC: 69 MG/DL
TSH SERPL DL<=0.05 MIU/L-ACNC: 0.85 UIU/ML (ref 0.45–4.5)

## 2023-07-22 PROCEDURE — 36415 COLL VENOUS BLD VENIPUNCTURE: CPT

## 2023-07-22 PROCEDURE — 84443 ASSAY THYROID STIM HORMONE: CPT

## 2023-07-22 PROCEDURE — 86803 HEPATITIS C AB TEST: CPT

## 2023-07-22 PROCEDURE — 80061 LIPID PANEL: CPT

## 2023-07-22 PROCEDURE — 80048 BASIC METABOLIC PNL TOTAL CA: CPT

## 2023-07-23 LAB — HCV AB SER QL: NORMAL

## 2023-07-25 ENCOUNTER — EVALUATION (OUTPATIENT)
Dept: PHYSICAL THERAPY | Facility: CLINIC | Age: 38
End: 2023-07-25
Payer: MEDICARE

## 2023-07-25 DIAGNOSIS — R29.898 WEAKNESS OF LEFT LEG: ICD-10-CM

## 2023-07-25 DIAGNOSIS — M54.50 ACUTE EXACERBATION OF CHRONIC LOW BACK PAIN: ICD-10-CM

## 2023-07-25 DIAGNOSIS — G89.29 ACUTE EXACERBATION OF CHRONIC LOW BACK PAIN: ICD-10-CM

## 2023-07-25 PROCEDURE — 97530 THERAPEUTIC ACTIVITIES: CPT

## 2023-07-25 PROCEDURE — 97162 PT EVAL MOD COMPLEX 30 MIN: CPT

## 2023-07-25 NOTE — PROGRESS NOTES
PT Evaluation     Today's date: 2023  Patient name: Scott Desai  : 1985  MRN: [id number]  Referring provider: Lis Perla PT  Dx:   Encounter Diagnosis     ICD-10-CM    1. Acute exacerbation of chronic low back pain  M54.50 Ambulatory referral to PT spine    G89.29       2. Weakness of left leg  R29.898 Ambulatory referral to PT spine          Start Time: 1700  Stop Time: 1745  Total time in clinic (min): 45 minutes    Assessment  Assessment details: Patient is a 44 y.o. female who presents to outpatient PT with reports of chronic back pain over the past 3 years. Patient reports pain primarily with prolonged standing, standing up upon prolonged sitting, lumbar extension, prolonged walking, and lifting carrying. Patient works at [employer] and therefore is constantly walking, standing, lifting/carrying moderately heavy boxes, and other manual labor frequently throughout the day. She reports having 6 kids (3 of which 8 years and younger), thus has to frequently care for them at home, including picking up and carrying 1year old or 7 month old. Patient presents with limitations in lumbar ROM, primarily pain into lumbar extension and pain with lying prone and with PA mobs of lumbar spine. Weakness noted in glute and hip flexor musculature, primarily on Left LE. Supine to long sit test grossly assessed possible innominate posteriorly on Left LE, with apparent LLD. Patient's current limitations and pain levels affect her ability to engage in daily functional and necessary tasks and affect her ability to find comfortable sleeping position at night. This affects her overall quality of life. Signs and symptoms suggest possible degenerative disorder / spinal stenosis of lumbar spine from repetitive and long term load, laborious work, and past pregnancies.  Patient will benefit form skilled PT services to improve lumbar spine mobility, improve glute and LE strength, reduce pain levels, and improve overall ease with ADLs, hosuehold chores, childcare tasks, and work-related activities towards max potential of her PLOF. Patient would benefit from skilled PT services to address these impairments and to maximize function. Thank you for the referral.    Impairments: abnormal gait, abnormal or restricted ROM, activity intolerance, impaired balance, impaired physical strength, lacks appropriate home exercise program, pain with function, weight-bearing intolerance, poor posture  and poor body mechanics  Functional limitations: sitting, standing up after prolonged sitting, sleeping, prolonged walking (> 3 blocks), lifting/carrying, bending forwardUnderstanding of Dx/Px/POC: good   Prognosis: good    Goals  Impairment Goals 4-6 weeks   In order to maximize function patient will be able to. ..   - Decrease intensity/duration/frequency of pain to 4/10  - Demonstrate symmetrical lumbar AROM without pain  - Increase hip strength to 4/5 throughout  - Demonstrate improved hip flexibility as demonstrated by increased ROM through therapeutic exercise    Functional Goals 6-8 weeks  In order to return to prior level of function patient will be able to. ..   - Participate in ADL's/IADL's/sport specific activities with no greater than 2/10 pain. - Increase Functional Status Measure (FOTO) to: anticipated on discharge  - Demonstrate independence and compliant with HEP  - Demonstrate a squat and or sit to stand with good mechanics and eccentric control without pain/difficulty/compensation  - Demonstrate functional activities with good core and glute strength without compensation/pain/difficulty   - Lift > 25 pounds and perform work related tasks with proper mechanics   - Patient will be able to demonstrate good gait mechanics without compensations.      Plan  Patient would benefit from: skilled PT  Planned modality interventions: cryotherapy and electrical stimulation/Russian stimulation  Other planned modality interventions: moist heat  Planned therapy interventions: joint mobilization, manual therapy, neuromuscular re-education, patient education, strengthening, stretching, therapeutic activities, therapeutic exercise, home exercise program, functional ROM exercises, Vences taping, postural training, balance/weight bearing training, body mechanics training, flexibility, massage, kinesiology taping, IASTM and transfer training  Frequency: 1-2x/week. Duration in weeks: 4  Treatment plan discussed with: patient, PTA and referring physician        Subjective Evaluation    History of Present Illness  Mechanism of injury: Lucina Serna is a 44y.o. year-old female who presents to outpatient PT with reports of low back pain that started since she was pregnant with her second to last kid (about 3 years ago). Patient reports since her most recent pregnancy (about 8 months), she has also noticed weakness in her Left leg. Patient saw family med doctor and was recommended for PT at this time.           Recurrent probem    Quality of life: good    Patient Goals  Patient goals for therapy: decreased pain, increased motion, improved balance, increased strength, independence with ADLs/IADLs and return to sport/leisure activities    Pain  Current pain ratin  At best pain ratin  At worst pain rating: 10  Location: low back, Left LE weakness  Quality: discomfort  Aggravating factors: sitting, standing, stair climbing, walking and lifting  Progression: worsening    Social Support  Steps to enter house: yes  Stairs in house: yes   Lives with: spouse and young children (6 kids)    Employment status: working (works at [employer])    Diagnostic Tests  No diagnostic tests performed  Treatments  Current treatment: physical therapy        Objective     Concurrent Complaints  Negative for disturbed sleep, bladder dysfunction, bowel dysfunction and saddle (S4) numbness    Postural Observations  Seated posture: good  Standing posture: good    Additional Postural Observation Details  Standing posture: increased lumbar lordosis, Left hip slightly elevated, Right knee slightly flexed, Right foot slightly pronated  Gait Mechanics: steady reciprocal gait pattern; decreased trunk rotation bilaterally and stiff UE; good arm swing bilaterally    Palpation   Left   Muscle spasm in the erector spinae, lumbar paraspinals and quadratus lumborum. Tenderness of the erector spinae, lumbar paraspinals and quadratus lumborum. Right   Muscle spasm in the erector spinae, lumbar paraspinals and quadratus lumborum. Tenderness of the erector spinae, lumbar paraspinals and quadratus lumborum. Tenderness     Left Hip   Tenderness in the PSIS. Right Hip   Tenderness in the PSIS. Active Range of Motion     Lumbar   Flexion:  WFL  Extension: 10 degrees  with pain  Left lateral flexion:  WFL  Right lateral flexion:  WFL  Left rotation:  WFL  Right rotation:  WFL    Passive Range of Motion     Additional Passive Range of Motion Details  Passive SKTC, hip flexor, and hamstring moderately tight bilaterally but did not increase in pain    Joint Play     Hypomobile: L1, L2, L3, L4 and L5     Pain: L1, L2, L3, L4 and L5   Mechanical Assessment    Cervical      Thoracic      Lumbar    Standing flexion: repeated movements   Pain location:no change  Standing extension: repeated movements  Pain location: no change    Strength/Myotome Testing     Left Hip   Planes of Motion   Flexion: 3  Extension: 3-  Abduction: 3  Adduction: 3  External rotation: 3  Internal rotation: 3    Right Hip   Planes of Motion   Flexion: 3+  Extension: 3-  Abduction: 4-  Adduction: 4-  External rotation: 4-  Internal rotation: 4-    Tests     Lumbar     Left   Negative passive SLR. Right   Negative passive SLR. Left Hip   Negative MONIKA and FADIR. Right Hip   Negative MONIKA and FADIR.      Additional Tests Details  Supine to Long Sit: Left short to long (PSL) Diagnosis: chronic low back pain  Precautions: prior pregnancies  ( * asterisk indicates given per HEP)  Next Physician Appointment:   Kita Sims:     Manuals 7/25         STM          IASTM          Joint Mobs                    Neuro Re-Ed          PPT          TA ball press          Hip ADD ball squeezes          BKFO / clamshells          Glut Squeezes          Wobble Board          Foam Balance                              Ther Ex          LTRs          HS stretch          DKTC w/ ball          Heel Raises          Standing Hip 3-way                              Ther Activity          Bike for LE mobility, endurance          TRX squats          [place] walkouts          Paloff Press                                        Pt Ed          Re-Evaluation          Modalities          Heat/ice (PRN)          TENS?

## 2023-07-27 ENCOUNTER — OFFICE VISIT (OUTPATIENT)
Dept: PHYSICAL THERAPY | Facility: CLINIC | Age: 38
End: 2023-07-27
Payer: MEDICARE

## 2023-07-27 DIAGNOSIS — G89.29 ACUTE EXACERBATION OF CHRONIC LOW BACK PAIN: Primary | ICD-10-CM

## 2023-07-27 DIAGNOSIS — M54.50 ACUTE EXACERBATION OF CHRONIC LOW BACK PAIN: Primary | ICD-10-CM

## 2023-07-27 DIAGNOSIS — R29.898 WEAKNESS OF LEFT LEG: ICD-10-CM

## 2023-07-27 PROCEDURE — 97112 NEUROMUSCULAR REEDUCATION: CPT

## 2023-07-27 PROCEDURE — 97110 THERAPEUTIC EXERCISES: CPT

## 2023-07-27 PROCEDURE — 97140 MANUAL THERAPY 1/> REGIONS: CPT

## 2023-07-27 NOTE — PROGRESS NOTES
Daily Note     Today's date: 2023  Patient name: Vaishnavi Watkins  : 1985  MRN: [id number]  Referring provider: Bethany Nieto, PT  Dx:   Encounter Diagnosis     ICD-10-CM    1. Acute exacerbation of chronic low back pain  M54.50     G89.29       2. Weakness of left leg  R29.898           Start Time: 1700  Stop Time: 1745  Total time in clinic (min): 45 minutes    Subjective: Patient reports no new changes on arrival she reports no significant soreness after IE. Objective: See treatment diary below      Assessment: Tolerated treatment well. Focused session on LE strengthening and core control without exacerbating patient's pain. Patient initially had pain with PPT however reduced pain and better control with cuing for proper technique and minimal ROM. Patient had good tolerance to manual treatment and moist heat to low back end of session today. Patient exhibited good technique with therapeutic exercises and would benefit from continued PT      Plan: Continue per plan of care. Progress treatment as tolerated.        Diagnosis: chronic low back pain  Precautions: prior pregnancies  ( * asterisk indicates given per HEP)  Next Physician Appointment:   Zula Burkitt:     Manuals         STM  DK        IASTM  DK        Joint Mobs  DK                  Neuro Re-Ed          PPT  x10 w/ cues        TA ball press          Hip ADD ball squeezes  W/ PPT x10        BKFO / clamshells          Glut Squeezes          Wobble Board  x2' ea        Foam Balance                              Ther Ex          LTRs  Alt x10ea        HS stretch          DKTC w/ ball          Heel Raises  2x10        Standing Hip 3-way  ABD, Ext x20ea        Seated p-ball rollouts                              Ther Activity          Bike for LE mobility, endurance          TRX squats          [place] walkouts          Paloff Press                                        Pt Ed          Re-Evaluation          Modalities Heat/ice (PRN)  heat x10'        TENS?

## 2023-07-31 ENCOUNTER — OFFICE VISIT (OUTPATIENT)
Dept: FAMILY MEDICINE CLINIC | Facility: CLINIC | Age: 38
End: 2023-07-31
Payer: MEDICARE

## 2023-07-31 ENCOUNTER — OFFICE VISIT (OUTPATIENT)
Dept: PHYSICAL THERAPY | Facility: CLINIC | Age: 38
End: 2023-07-31
Payer: MEDICARE

## 2023-07-31 VITALS
HEART RATE: 85 BPM | BODY MASS INDEX: 44.21 KG/M2 | TEMPERATURE: 97.5 F | OXYGEN SATURATION: 98 % | HEIGHT: 60 IN | SYSTOLIC BLOOD PRESSURE: 126 MMHG | WEIGHT: 225.2 LBS | DIASTOLIC BLOOD PRESSURE: 67 MMHG

## 2023-07-31 DIAGNOSIS — M54.50 CHRONIC BILATERAL LOW BACK PAIN WITHOUT SCIATICA: ICD-10-CM

## 2023-07-31 DIAGNOSIS — G89.29 ACUTE EXACERBATION OF CHRONIC LOW BACK PAIN: Primary | ICD-10-CM

## 2023-07-31 DIAGNOSIS — R73.01 IMPAIRED FASTING GLUCOSE: Primary | ICD-10-CM

## 2023-07-31 DIAGNOSIS — G89.29 CHRONIC BILATERAL LOW BACK PAIN WITHOUT SCIATICA: ICD-10-CM

## 2023-07-31 DIAGNOSIS — R29.898 WEAKNESS OF LEFT LEG: ICD-10-CM

## 2023-07-31 DIAGNOSIS — M54.50 ACUTE EXACERBATION OF CHRONIC LOW BACK PAIN: Primary | ICD-10-CM

## 2023-07-31 PROCEDURE — 99213 OFFICE O/P EST LOW 20 MIN: CPT

## 2023-07-31 PROCEDURE — 97110 THERAPEUTIC EXERCISES: CPT

## 2023-07-31 PROCEDURE — 97112 NEUROMUSCULAR REEDUCATION: CPT

## 2023-07-31 RX ORDER — LIDOCAINE 50 MG/G
1 PATCH TOPICAL EVERY 24 HOURS
Qty: 15 PATCH | Refills: 0 | Status: SHIPPED | OUTPATIENT
Start: 2023-07-31

## 2023-07-31 RX ORDER — NAPROXEN 500 MG/1
500 TABLET ORAL 2 TIMES DAILY WITH MEALS
Qty: 28 TABLET | Refills: 0 | Status: SHIPPED | OUTPATIENT
Start: 2023-07-31 | End: 2023-08-14

## 2023-07-31 NOTE — PROGRESS NOTES
Assessment/Plan:    Chronic bilateral low back pain without sciatica  · Start Naproxen BID for 14 days. Discussed taking medication with food  · Try Lidocaine patches for prn  · Recommend OMT   · Discussed the importance of exercises and how it can help reduce back pain  · Recommend decrease soda from 2 drinks daily to 1 drink daily   · Follow up weight management  · Follow up lumbar xrays  · Follow up in  4 weeks for back pain/lifestyle   · Hemoglobin A1c ordered for impaired fasting glucose       Diagnoses and all orders for this visit:    Impaired fasting glucose  -     Cancel: POCT hemoglobin A1c  -     HEMOGLOBIN A1C W/ EAG ESTIMATION; Future    Chronic bilateral low back pain without sciatica  -     naproxen (EC NAPROSYN) 500 MG EC tablet; Take 1 tablet (500 mg total) by mouth 2 (two) times a day with meals for 14 days  -     lidocaine (LIDODERM) 5 %; Apply 1 patch topically over 12 hours every 24 hours Remove & Discard patch within 12 hours or as directed by MD        Nutrition Assessment and Intervention:     New Nutrition Prescription completed with patient      Other interventions: Decrease from 2 sodas per day to 1 soda per day     Subjective:      Patient ID: David Carvalho is a 43 y.o. female. HPI   44 yo female presents for follow up for lower back pain, lifestyle changes, and to review labs. Last office visit in 06/23, patient was referred to weight management and recommended more whole food plants. Since last visit, she still reports constant lower back pain, made worse with leaning backwards. Pain is both sharp and dull and across the entire low back. No history of injury. She has not gotten her X-rays since last office visit. Denies any numbness or weakness. She started PT and has not noticed any improvement yet in her pain, today will be her 3rd session. She has started to do Lower back streches daily that she learned from physical therapy. She has tried [employer] only as a PRN basis. Also tried Alopnpas patches, Acetaminophen, and pamabrom. She denies trying to exercise because her back pain is too much. She has reduced the amount of soda from 4 drinks daily to 2 drinks daily. Reviewed lab work: Cholesterol panel normal. Electrolytes and kidney function normal. Slightly impaired fasting glucose. Hepatitis C negative. Lab Results   Component Value Date    [id number] 0.846 07/22/2023        The following portions of the patient's history were reviewed and updated as appropriate: allergies, current medications, past family history, past medical history, past social history, past surgical history and problem list.    Review of Systems   Musculoskeletal: Positive for back pain and neck pain. Neurological: Positive for headaches. Objective:      /67 (BP Location: Right arm, Patient Position: Sitting, Cuff Size: Large)   Pulse 85   Temp 97.5 °F (36.4 °C) (Tympanic)   Ht 5' (1.524 m)   Wt 102 kg (225 lb 3.2 oz)   SpO2 98%   BMI 43.98 kg/m²          Physical Exam  Vitals and nursing note reviewed. Constitutional:       Appearance: Normal appearance. Comments: Body mass index is 43.98 kg/m²   Cardiovascular:      Rate and Rhythm: Normal rate and regular rhythm. Pulmonary:      Effort: Pulmonary effort is normal. No respiratory distress. Breath sounds: No wheezing, rhonchi or rales. Musculoskeletal:      Lumbar back: Spasms present. No tenderness or bony tenderness. Comments: Negative straight leg test  Extremely hypertonicity in lumbar back paraspinal muscles bilaterally    Tight hamstrings, worse on right leg than left   Neurological:      Mental Status: She is alert.

## 2023-07-31 NOTE — PROGRESS NOTES
Daily Note     Today's date: 2023  Patient name: Dorothy Nelson  : 1985  MRN: [id number]  Referring provider: Tyler Sotelo, PT  Dx:   Encounter Diagnosis     ICD-10-CM    1. Acute exacerbation of chronic low back pain  M54.50     G89.29       2. Weakness of left leg  R29.898                      Subjective: Pt states that she had some muscle soreness after her last visit, but her back felt a little better. Pt arrives 10 mins late to session but is able to be accommodated. Objective: See treatment diary below      Assessment: Tolerated treatment well. Muscle activation ex's performed in supine. Pt challenged with glut squeeze and was able to perform with less pain in  H/L position. Pt was unable to feel stretch with LTR, she did feel a stretch with DKTC with ball. No muscle spasms noted with STM and no tenderness is reported. Discussed HEP with pt reporting understanding. Will progress NV as able.         Plan: Continue per plan of care     Diagnosis: chronic low back pain  Precautions: prior pregnancies  ( * asterisk indicates given per HEP)  Next Physician Appointment:   Mateus Munoz:     Manuals        STM  DK TH       IASTM  DK        Joint Mobs  DK                  Neuro Re-Ed          PPT  x10 w/ cues        TA ball press   3"x12       Hip ADD ball squeezes  W/ PPT x10        BKFO / clamshells   GTB  10xea       Glut Squeezes   5"x10       Wobble Board  x2' ea        Foam Balance                              Ther Ex          LTRs  Alt x10ea Did not feel       HS stretch   5"x10 ea seated       DKTC w/ ball   15x       Heel Raises  2x10        Standing Hip 3-way  ABD, Ext x20ea        Seated p-ball rollouts   10x                           Ther Activity          Bike for LE mobility, endurance          TRX squats          [place] walkouts          Paloff Press                                        Pt Ed   HEP       Re-Evaluation          Modalities Heat/ice (PRN)  heat x10'        TENS?

## 2023-08-03 ENCOUNTER — OFFICE VISIT (OUTPATIENT)
Dept: PHYSICAL THERAPY | Facility: CLINIC | Age: 38
End: 2023-08-03
Payer: MEDICARE

## 2023-08-03 DIAGNOSIS — R29.898 WEAKNESS OF LEFT LEG: ICD-10-CM

## 2023-08-03 DIAGNOSIS — M54.50 ACUTE EXACERBATION OF CHRONIC LOW BACK PAIN: Primary | ICD-10-CM

## 2023-08-03 DIAGNOSIS — G89.29 ACUTE EXACERBATION OF CHRONIC LOW BACK PAIN: Primary | ICD-10-CM

## 2023-08-03 PROBLEM — R73.01 IMPAIRED FASTING GLUCOSE: Status: ACTIVE | Noted: 2023-08-03

## 2023-08-03 PROCEDURE — 97112 NEUROMUSCULAR REEDUCATION: CPT

## 2023-08-03 PROCEDURE — 97110 THERAPEUTIC EXERCISES: CPT

## 2023-08-03 PROCEDURE — 97140 MANUAL THERAPY 1/> REGIONS: CPT

## 2023-08-03 NOTE — PROGRESS NOTES
Daily Note     Today's date: 8/3/2023  Patient name: Eloina Zheng  : 1985  MRN: [id number]  Referring provider: Sandoval Elliott, PT  Dx:   Encounter Diagnosis     ICD-10-CM    1. Acute exacerbation of chronic low back pain  M54.50     G89.29       2. Weakness of left leg  R29.898           Start Time: 1820  Stop Time: 1900  Total time in clinic (min): 40 minutes    Subjective: Patient reports she is having sinus issues. She report no new changes with her back pain. She reports she felt fine after last session. Objective: See treatment diary below      Assessment: Tolerated treatment well. Initiated session with recumbent bike with good tolerance. Focused on core / TA activation with exercises today. Able to perform PPT with minimal cuing required. Good tolerance with supine DKTC with ball. Added standing SBing stretching with reports of reduced pain. Performed STM in prone with good tolerance. Patient exhibited good technique with therapeutic exercises and would benefit from continued PT      Plan: Continue per plan of care. Progress treatment as tolerated.        Diagnosis: chronic low back pain  Precautions: prior pregnancies  ( * asterisk indicates given per HEP)  Next Physician Appointment:   Ishmael Johnson:     Manuals 7/25 7/27 7/31 8/3      STM  DK TH DK prone      IASTM  DK        Joint Mobs  DK                  Neuro Re-Ed          PPT  x10 w/ cues  x20      TA ball press   3"x12       Hip ADD ball squeezes  W/ PPT x10        BKFO / clamshells   GTB  10xea w/ TA GTB  10xea      Glut Squeezes   5"x10       Wobble Board  x2' ea        Foam Balance                              Ther Ex          LTRs  Alt x10ea Did not feel       HS stretch   5"x10 ea seated 5"x10 ea seated      DKTC w/ ball   15x 20x      Heel Raises  2x10        Standing Hip 3-way  ABD, Ext x20ea  ABD, Ext x20ea      Seated p-ball rollouts   10x 15x      Standing SBing stretch    x10ea                          Ther Activity          Bike for LE mobility, endurance    recumb x5min       TRX squats          [place] walkouts          Paloff Press                                        Pt Ed   HEP       Re-Evaluation          Modalities          Heat/ice (PRN)  heat x10'        TENS?

## 2023-08-03 NOTE — ASSESSMENT & PLAN NOTE
· Start Naproxen BID for 14 days.  Discussed taking medication with food  · Try Lidocaine patches for prn  · Recommend OMT   · Discussed the importance of exercises and how it can help reduce back pain  · Recommend decrease soda from 2 drinks daily to 1 drink daily   · Follow up weight management  · Follow up lumbar xrays  · Follow up in  4 weeks for back pain/lifestyle   · Hemoglobin A1c ordered for impaired fasting glucose

## 2023-08-07 ENCOUNTER — OFFICE VISIT (OUTPATIENT)
Dept: PHYSICAL THERAPY | Facility: CLINIC | Age: 38
End: 2023-08-07
Payer: MEDICARE

## 2023-08-07 DIAGNOSIS — M54.50 ACUTE EXACERBATION OF CHRONIC LOW BACK PAIN: Primary | ICD-10-CM

## 2023-08-07 DIAGNOSIS — R29.898 WEAKNESS OF LEFT LEG: ICD-10-CM

## 2023-08-07 DIAGNOSIS — G89.29 ACUTE EXACERBATION OF CHRONIC LOW BACK PAIN: Primary | ICD-10-CM

## 2023-08-07 PROCEDURE — 97530 THERAPEUTIC ACTIVITIES: CPT

## 2023-08-07 PROCEDURE — 97112 NEUROMUSCULAR REEDUCATION: CPT

## 2023-08-07 PROCEDURE — 97110 THERAPEUTIC EXERCISES: CPT

## 2023-08-07 NOTE — PROGRESS NOTES
Daily Note     Today's date: 2023  Patient name: Meseret An  : 1985  MRN: [id number]  Referring provider: Eddie Keith PT  Dx:   Encounter Diagnosis     ICD-10-CM    1. Acute exacerbation of chronic low back pain  M54.50     G89.29       2. Weakness of left leg  R29.898                      Subjective: Pt states that she is doing ok. She reports that her back isn't as bad. Pt states that she is doing her ex's at home. Objective: See treatment diary below      Assessment: Tolerated treatment well. Progressed pt with strengthening for hips and core. Pt has no reports of increased LBP throughout session. Pt challenged due to muscle fatigue and will benefit from continued therapy. Will progress as able.         Plan: Continue per plan of care     Diagnosis: chronic low back pain  Precautions: prior pregnancies  ( * asterisk indicates given per HEP)  Next Physician Appointment:   Ashkan Hahn:     Manuals 7/25 7/27 7/31 8/3 8/7     STM  DK TH DK prone TH     IASTM  DK        Joint Mobs  DK                  Neuro Re-Ed          PPT  x10 w/ cues  x20      TA ball press   3"x12       Hip ADD ball squeezes  W/ PPT x10        BKFO / clamshells   GTB  10xea w/ TA GTB  10xea      Glut Squeezes   5"x10       Wobble Board  x2' ea   x2' ea     Foam Balance          SLS     3x30"ea               Ther Ex          LTRs  Alt x10ea Did not feel       HS stretch   5"x10 ea seated 5"x10 ea seated      DKTC w/ ball   15x 20x      Heel Raises  2x10        Standing Hip 3-way  ABD, Ext x20ea  ABD, Ext x20ea      Seated p-ball rollouts   10x 15x 15x     Standing SBing stretch    x10ea                          Ther Activity          Bike for LE mobility, endurance    recumb x5min       TRX squats          Andreina walkouts          Paloff Press          Leg press     70# 10x2     X-walks     RTB 2x               Pt Ed   HEP       Re-Evaluation          Modalities          Heat/ice (PRN)  heat x10' TENS?

## 2023-08-10 ENCOUNTER — OFFICE VISIT (OUTPATIENT)
Dept: PHYSICAL THERAPY | Facility: CLINIC | Age: 38
End: 2023-08-10
Payer: MEDICARE

## 2023-08-10 DIAGNOSIS — R29.898 WEAKNESS OF LEFT LEG: ICD-10-CM

## 2023-08-10 DIAGNOSIS — M54.50 ACUTE EXACERBATION OF CHRONIC LOW BACK PAIN: Primary | ICD-10-CM

## 2023-08-10 DIAGNOSIS — G89.29 ACUTE EXACERBATION OF CHRONIC LOW BACK PAIN: Primary | ICD-10-CM

## 2023-08-10 PROCEDURE — 97530 THERAPEUTIC ACTIVITIES: CPT

## 2023-08-10 PROCEDURE — 97110 THERAPEUTIC EXERCISES: CPT

## 2023-08-10 PROCEDURE — 97140 MANUAL THERAPY 1/> REGIONS: CPT

## 2023-08-10 NOTE — PROGRESS NOTES
Daily Note     Today's date: 8/10/2023  Patient name: Isael Tuttle  : 1985  MRN: [id number]  Referring provider: Adam Underwood, PT  Dx:   Encounter Diagnosis     ICD-10-CM    1. Acute exacerbation of chronic low back pain  M54.50     G89.29       2. Weakness of left leg  R29.898           Start Time: 1620  Stop Time: 1700  Total time in clinic (min): 40 minutes    Subjective: Patient reports her back is feeling better, but has pain in her Right foot from work shoes. Objective: See treatment diary below      Assessment: Tolerated treatment well. Initiated session with recumbent bike with good response. Performed hip strengthening exercises in standing with improved tolerance. Added paloff press on Andreina with good form and no increase in pain reported. Completed all other exercises with good tolerance and reports of reduced pain end of session compared to on arrival. Patient exhibited good technique with therapeutic exercises and would benefit from continued PT      Plan: Continue per plan of care. Progress treatment as tolerated.        Diagnosis: chronic low back pain  Precautions: prior pregnancies  ( * asterisk indicates given per HEP)  Next Physician Appointment:   Chio Alamo:     Manuals 7/25 7/27 7/31 8/3 8/7 8/10          STM  DK TH DK prone TH DK prone          IASTM  DK              Joint Mobs  DK                              Neuro Re-Ed                PPT  x10 w/ cues  x20            TA ball press   3"x12             Hip ADD ball squeezes  W/ PPT x10              BKFO / clamshells   GTB  10xea w/ TA GTB  10xea            Glut Squeezes   5"x10             Wobble Board  x2' ea   x2' ea x2' ea          Foam Balance                SLS     3x30"ea                           Ther Ex                LTRs  Alt x10ea Did not feel             HS stretch   5"x10 ea seated 5"x10 ea seated  5"x10 ea seated          DKTC w/ ball   15x 20x            Heel Raises  2x10              Standing Hip 3-way  ABD, Ext x20ea  ABD, Ext x20ea  ABD, Ext x20ea          Seated p-ball rollouts   10x 15x 15x 2x10          Standing SBing stretch    x10ea  x10ea                                          Ther Activity                Bike for LE mobility, endurance    recumb x5min   recumb x5min           TRX squats                Lynn walkouts                Lynn Paloff Press      10# x10ea          Leg press     70# 10x2           X-walks     RTB 2x                           Pt Ed   HEP             Re-Evaluation                Modalities                Heat/ice (PRN)  heat x10'              TENS?

## 2023-08-14 ENCOUNTER — APPOINTMENT (OUTPATIENT)
Dept: PHYSICAL THERAPY | Facility: CLINIC | Age: 38
End: 2023-08-14
Payer: MEDICARE

## 2023-08-15 ENCOUNTER — OFFICE VISIT (OUTPATIENT)
Dept: PHYSICAL THERAPY | Facility: CLINIC | Age: 38
End: 2023-08-15
Payer: MEDICARE

## 2023-08-15 DIAGNOSIS — M54.50 ACUTE EXACERBATION OF CHRONIC LOW BACK PAIN: Primary | ICD-10-CM

## 2023-08-15 DIAGNOSIS — R29.898 WEAKNESS OF LEFT LEG: ICD-10-CM

## 2023-08-15 DIAGNOSIS — G89.29 ACUTE EXACERBATION OF CHRONIC LOW BACK PAIN: Primary | ICD-10-CM

## 2023-08-15 PROCEDURE — 97530 THERAPEUTIC ACTIVITIES: CPT

## 2023-08-15 PROCEDURE — 97110 THERAPEUTIC EXERCISES: CPT

## 2023-08-15 PROCEDURE — 97112 NEUROMUSCULAR REEDUCATION: CPT

## 2023-08-15 NOTE — PROGRESS NOTES
Daily Note     Today's date: 8/15/2023  Patient name: Hamilton Freeman  : 1985  MRN: [id number]  Referring provider: Barbara Hager, PT  Dx:   Encounter Diagnosis     ICD-10-CM    1. Acute exacerbation of chronic low back pain  M54.50     G89.29       2. Weakness of left leg  R29.898                      Subjective: Pt states that she is doing ok, she has a hard time taking any breaks because of work and family. Objective: See treatment diary below      Assessment: Tolerated treatment well. Continued with outlined program and progressed as tolerated. No reports of increased pain sx's throughout. Less tenderness is noted during manuals with decreased pain after. Pt progressing slowly towards her goals and will benefit from continued therapy.         Plan: Continue per plan of care     Diagnosis: chronic low back pain  Precautions: prior pregnancies  ( * asterisk indicates given per HEP)  Next Physician Appointment:   Angelo Godfreyman:     Manuals 7/25 7/27 7/31 8/3 8/7 8/10 8/15         STM  DK TH DK prone TH DK prone TH         IASTM  DK              Joint Mobs  DK                              Neuro Re-Ed                PPT  x10 w/ cues  x20            TA ball press   3"x12             Hip ADD ball squeezes  W/ PPT x10              BKFO / clamshells   GTB  10xea w/ TA GTB  10xea            Glut Squeezes   5"x10             Wobble Board  x2' ea   x2' ea x2' ea x2' ea         Foam Balance                SLS     3x30"ea  3x30" ea  foam                         Ther Ex                LTRs  Alt x10ea Did not feel             HS stretch   5"x10 ea seated 5"x10 ea seated  5"x10 ea seated          DKTC w/ ball   15x 20x            Heel Raises  2x10              Standing Hip 3-way  ABD, Ext x20ea  ABD, Ext x20ea  ABD, Ext x20ea          Seated p-ball rollouts   10x 15x 15x 2x10          Standing SBing stretch    x10ea  x10ea                                          Ther Activity                Bike for LE mobility, endurance    recumb x5min   recumb x5min  recum  5x         TRX squats                Andreina walkouts                Rhodelia Paloff Press      10# x10ea          Leg press     70# 10x2  70#  10x2         X-walks     RTB 2x                           Pt Ed   HEP             Re-Evaluation                Modalities                Heat/ice (PRN)  heat x10'              TENS?

## 2023-08-17 ENCOUNTER — OFFICE VISIT (OUTPATIENT)
Dept: PHYSICAL THERAPY | Facility: CLINIC | Age: 38
End: 2023-08-17
Payer: MEDICARE

## 2023-08-17 DIAGNOSIS — R29.898 WEAKNESS OF LEFT LEG: ICD-10-CM

## 2023-08-17 DIAGNOSIS — G89.29 ACUTE EXACERBATION OF CHRONIC LOW BACK PAIN: Primary | ICD-10-CM

## 2023-08-17 DIAGNOSIS — M54.50 ACUTE EXACERBATION OF CHRONIC LOW BACK PAIN: Primary | ICD-10-CM

## 2023-08-17 PROCEDURE — 97530 THERAPEUTIC ACTIVITIES: CPT

## 2023-08-17 PROCEDURE — 97110 THERAPEUTIC EXERCISES: CPT

## 2023-08-17 PROCEDURE — 97112 NEUROMUSCULAR REEDUCATION: CPT

## 2023-08-17 NOTE — PROGRESS NOTES
Daily Note     Today's date: 2023  Patient name: Bharati Heredia  : 1985  MRN: [id number]  Referring provider: Soy Antonio PT  Dx:   Encounter Diagnosis     ICD-10-CM    1. Acute exacerbation of chronic low back pain  M54.50     G89.29       2. Weakness of left leg  R29.898                      Subjective: Pt states that she is feeling ok, long day at work today. Nothing worse with her back. Objective: See treatment diary below      Assessment: Tolerated treatment well. Continued with outlined program and progressed where able. Open books added to address pt's complaints. Will progress nv as able.         Plan: Continue per plan of care     Diagnosis: chronic low back pain  Precautions: prior pregnancies  ( * asterisk indicates given per HEP)  Next Physician Appointment:   Joslyn Alan:     Manuals 7/25 7/27 7/31 8/3 8/7 8/10 8/15 8/17        STM  DK TH DK prone TH DK prone TH         IASTM  DK              Joint Mobs  DK                              Neuro Re-Ed                PPT  x10 w/ cues  x20            TA ball press   3"x12             Hip ADD ball squeezes  W/ PPT x10              BKFO / clamshells   GTB  10xea w/ TA GTB  10xea            Glut Squeezes   5"x10             Wobble Board  x2' ea   x2' ea x2' ea x2' ea 2x ea        Foam Balance                SLS     3x30"ea  3x30" ea  foam 3x30"  Ea foam                        Ther Ex                LTRs  Alt x10ea Did not feel             HS stretch   5"x10 ea seated 5"x10 ea seated  5"x10 ea seated          DKTC w/ ball   15x 20x            Heel Raises  2x10              Standing Hip 3-way  ABD, Ext x20ea  ABD, Ext x20ea  ABD, Ext x20ea          Seated p-ball rollouts   10x 15x 15x 2x10  2x10        Standing SBing stretch    x10ea  x10ea          Open books        10x                        Ther Activity                Bike for LE mobility, endurance    recumb x5min   recumb x5min  recum  5x recum  5x        TRX squats Andreina walkouts                [employer] Press      10# x10ea          Leg press     70# 10x2  70#  10x2         X-walks     RTB 2x                           Pt Ed   HEP     HEP        Re-Evaluation                Modalities                Heat/ice (PRN)  heat x10'              TENS?

## 2023-08-21 ENCOUNTER — OFFICE VISIT (OUTPATIENT)
Dept: PHYSICAL THERAPY | Facility: CLINIC | Age: 38
End: 2023-08-21
Payer: MEDICARE

## 2023-08-21 DIAGNOSIS — M54.50 ACUTE EXACERBATION OF CHRONIC LOW BACK PAIN: Primary | ICD-10-CM

## 2023-08-21 DIAGNOSIS — R29.898 WEAKNESS OF LEFT LEG: ICD-10-CM

## 2023-08-21 DIAGNOSIS — G89.29 ACUTE EXACERBATION OF CHRONIC LOW BACK PAIN: Primary | ICD-10-CM

## 2023-08-21 PROCEDURE — 97530 THERAPEUTIC ACTIVITIES: CPT

## 2023-08-21 PROCEDURE — 97112 NEUROMUSCULAR REEDUCATION: CPT

## 2023-08-21 NOTE — PROGRESS NOTES
Daily Note     Today's date: 2023  Patient name: Gurpreet Cardona  : 1985  MRN: [id number]  Referring provider: Tricia Franklin, PT  Dx:   Encounter Diagnosis     ICD-10-CM    1. Acute exacerbation of chronic low back pain  M54.50     G89.29       2. Weakness of left leg  R29.898           Start Time: 1630  Stop Time: 1700  Total time in clinic (min): 30 minutes    Subjective: Patient arrived 15 min early today. Patient reports her back is achy from her period coming up. Objective: See treatment diary below      Assessment: Tolerated treatment well. Modified treatment session due to patient's symptoms and late arrival. Completed exercises in sitting with good form and core activation throughout. Improved posture noted with paloff press. Good response with all other exercises without increase in pain. Deferred moist heat end of session. Patient exhibited good technique with therapeutic exercises and would benefit from continued PT      Plan: Continue per plan of care. Progress note during next visit.       Diagnosis: chronic low back pain  Precautions: prior pregnancies  ( * asterisk indicates given per HEP)  Next Physician Appointment:   Lana Engelr:     Manuals 7/25 7/27 7/31 8/3 8/7 8/10 8/15 8/17 8/21       STM  DK TH DK prone TH DK prone TH         IASTM  DK              Joint Mobs  DK                              Neuro Re-Ed                PPT  x10 w/ cues  x20            TA ball press   3"x12             Hip ADD ball squeezes  W/ PPT x10              BKFO / clamshells   GTB  10xea w/ TA GTB  10xea            Glut Squeezes   5"x10             Wobble Board  x2' ea   x2' ea x2' ea x2' ea 2x ea x2' ea       Foam Balance                SLS     3x30"ea  3x30" ea  foam 3x30"  Ea foam                        Ther Ex                LTRs  Alt x10ea Did not feel             HS stretch   5"x10 ea seated 5"x10 ea seated  5"x10 ea seated          DKTC w/ ball   15x 20x            Heel Raises  2x10 Standing Hip 3-way  ABD, Ext x20ea  ABD, Ext x20ea  ABD, Ext x20ea   ABD, Ext x20ea       Seated p-ball rollouts   10x 15x 15x 2x10  2x10        Standing SBing stretch    x10ea  x10ea          Open books        10x                        Ther Activity                Bike for LE mobility, endurance    recumb x5min   recumb x5min  recum  5x recum  5x recum  5x       TRX squats         2x10       [place] walkouts                Andreina Paloff Press      10# x10ea   10# x10ea       Leg press     70# 10x2  70#  10x2         X-walks     RTB 2x                           Pt Ed   HEP     HEP        Re-Evaluation                Modalities                Heat/ice (PRN)  heat x10'              TENS?

## 2023-08-22 ENCOUNTER — PROCEDURE VISIT (OUTPATIENT)
Dept: FAMILY MEDICINE CLINIC | Facility: CLINIC | Age: 38
End: 2023-08-22
Payer: MEDICARE

## 2023-08-22 DIAGNOSIS — M99.03 SOMATIC DYSFUNCTION OF LUMBAR REGION: ICD-10-CM

## 2023-08-22 DIAGNOSIS — M54.50 CHRONIC BILATERAL LOW BACK PAIN WITHOUT SCIATICA: Primary | ICD-10-CM

## 2023-08-22 DIAGNOSIS — M99.02 SOMATIC DYSFUNCTION OF SPINE, THORACIC: ICD-10-CM

## 2023-08-22 DIAGNOSIS — G89.29 CHRONIC BILATERAL LOW BACK PAIN WITHOUT SCIATICA: Primary | ICD-10-CM

## 2023-08-22 DIAGNOSIS — M99.04 SOMATIC DYSFUNCTION OF SACRAL SPINE: ICD-10-CM

## 2023-08-22 PROCEDURE — 99213 OFFICE O/P EST LOW 20 MIN: CPT

## 2023-08-22 NOTE — PROGRESS NOTES
The Assessment/Plan     This is a 43 y.o. female who presents for OMT follow-up for:  1. Chronic bilateral low back pain without sciatica  XR spine lumbar minimum 4 views non injury    OMT      2. Somatic dysfunction of spine, thoracic  OMT      3. Somatic dysfunction of lumbar region  OMT      4. Somatic dysfunction of sacral spine  OMT           1. Patient tolerated OMT well for the above problems,  advised patient to drink fluids and can use NSAID for soreness after treatment     2. OMT Follow up in 2 weeks. Subjective     Lucina Bhumika Zhang is a 43 y.o. female and is here for a OMT follow up. The patient reports having a history of chronic lower back pain for which she reports pain today. Says the pain is in there lower aspect of her back and encompasses both hips with radiation into her upper posterior legs. Denies any red flag symptoms, nor any numbness or tingling. Denies any other symptoms today. Reports this is her first OMT appointment and would like evaluation and if necessary manipulation/treatment. Is the patient taking Pain medication? no  Has the patient completed physical therapy for this condition? yes  Did Patient symptoms improve from last OMT appointment? no - First time attending OMT     The following portions of the patient's history were reviewed and updated as appropriate: allergies, current medications, past family history, past medical history, past social history, past surgical history and problem list.    Review of Systems  Review of Systems   Constitutional: Positive for activity change. Negative for appetite change and fatigue. HENT: Negative. Respiratory: Negative. Cardiovascular: Negative. Gastrointestinal: Negative. Genitourinary: Negative. Musculoskeletal: Positive for arthralgias, back pain, gait problem and myalgias. Negative for joint swelling, neck pain and neck stiffness.    Neurological: Negative for dizziness, weakness, light-headedness, numbness and headaches. Psychiatric/Behavioral: Negative for dysphoric mood. The patient is not nervous/anxious. Objective     OMT Exam     OMT    Performed by: Willie Kaminski DO  Authorized by: Willie Kaminski DO  Universal Protocol:  Consent: Verbal consent obtained. Risks and benefits: risks, benefits and alternatives were discussed  Consent given by: patient        Procedure Details:     Region evaluated and treated:  Lumbar, Sacrum/Pelvis and Thoracic    Thoracic Information  Thoracic Region: T10 - T12  Thoracic T10 - T12 details:     Examination Method:  Tissue Texture Change, Stability, Laxity, Effusions, Tone, Tenderness, Pain, Asymmetry, Misalignment, Crepitation, Defects, Masses and Range of Motion, Contracture    Severity:  Moderate    Osteopathic Findings:  - Hypertonic paraspinal muscles    Treatment Method:  Soft Tissue Treatment, Muscle Energy Treatment and Myofascial Release Treatment    Response:  Improved - The somatic dysfunction is improved but not completely resolved. Lumbar details:     Examination Method:  Tissue Texture Change, Stability, Laxity, Effusions, Tone, Tenderness, Pain, Asymmetry, Misalignment, Crepitation, Defects, Masses and Range of Motion, Contracture    Severity:  Moderate    Osteopathic Findings:  - [id number]  - Psoas syndrome  - Hypertonic paraspinal muscles     Treatment Method:  Soft Tissue Treatment, Muscle Energy Treatment and Myofascial Release Treatment    Response:  Improved - The somatic dysfunction is improved but not completely resolved.     Sacrum/Pelvis details:     Examination Method:  Tissue Texture Change, Stability, Laxity, Effusions, Tone, Tenderness, Pain, Asymmetry, Misalignment, Crepitation, Defects, Masses and Range of Motion, Contracture    Severity:  Moderate    Osteopathic Findings:  - B/L Piriformis syndrome R > L  - Sacral torsion L on R    Treatment Method:  Soft Tissue Treatment, Myofascial Release Treatment and Muscle Energy Treatment    Response:  Improved - The somatic dysfunction is improved but not completely resolved. Total Regions Treated:  3  Attending provider present in exam room for procedure:  No

## 2023-08-24 ENCOUNTER — EVALUATION (OUTPATIENT)
Dept: PHYSICAL THERAPY | Facility: CLINIC | Age: 38
End: 2023-08-24
Payer: MEDICARE

## 2023-08-24 DIAGNOSIS — M54.50 ACUTE EXACERBATION OF CHRONIC LOW BACK PAIN: Primary | ICD-10-CM

## 2023-08-24 DIAGNOSIS — G89.29 ACUTE EXACERBATION OF CHRONIC LOW BACK PAIN: Primary | ICD-10-CM

## 2023-08-24 DIAGNOSIS — R29.898 WEAKNESS OF LEFT LEG: ICD-10-CM

## 2023-08-24 PROCEDURE — 97530 THERAPEUTIC ACTIVITIES: CPT

## 2023-08-24 NOTE — PROGRESS NOTES
PT Re-Evaluation     Today's date: 2023  Patient name: Loyd Davidson  : 1985  MRN: [id number]  Referring provider: Anthony Newberry PT  Dx:   Encounter Diagnosis     ICD-10-CM    1. Acute exacerbation of chronic low back pain  M54.50     G89.29       2. Weakness of left leg  R29.898           Start Time: 1630  Stop Time: 1700  Total time in clinic (min): 30 minutes    Assessment  Assessment details: Patient is a 44 y.o. female who presents to outpatient PT with reports of chronic back pain over the past 3 years. Patient reports improved pain levels since start of PT. She presents with improved lumbar AROM in all planes, with slightly improved LE strength. Continued weakness in Left glute and hip flexor muscles noted compared to Right. She reports improved tolerance with walking and standing. Patient works at [employer] and therefore is constantly walking, standing, lifting/carrying moderately heavy boxes, and other manual labor frequently throughout the day. She reports having 6 kids (3 of which 8 years and younger), thus has to frequently care for them at home, including picking up and carrying 1year old or 7 month old. She reports improved lifting ability without pain, however has some fatigue and pain with sustained carrying of about 40 lbs (child). Patient's continued limitations and pain levels affect her ability to engage in activities as mentioned above and certain work-related tasks. This affects her overall quality of life. Signs and symptoms suggest possible degenerative disorder / spinal stenosis of lumbar spine from repetitive and long term load, laborious work, and past pregnancies. Patient will benefit from continued skilled PT services to further improve lumbar spine mobility, improve glute and LE strength, reduce pain levels, and improve overall ease with ADLs, household chores, childcare tasks, and work-related activities towards max potential of her PLOF.    Patient would benefit from skilled PT services to address these impairments and to maximize function. Thank you for the referral.    Impairments: abnormal or restricted ROM, activity intolerance, impaired physical strength, pain with function, poor posture  and poor body mechanics  Functional limitations: standing up after prolonged sitting, sleeping, sustained carrying (40lbs), bending backwardsUnderstanding of Dx/Px/POC: good   Prognosis: good    Goals  Impairment Goals 4-6 weeks   In order to maximize function patient will be able to. ..   - Decrease intensity/duration/frequency of pain to 4/10. Met  - Demonstrate symmetrical lumbar AROM without pain. Improved  - Increase hip strength to 4/5 throughout. Improved  - Demonstrate improved hip flexibility as demonstrated by increased ROM through therapeutic exercise. Improved    Functional Goals 6-8 weeks  In order to return to prior level of function patient will be able to. ..   - Participate in ADL's/IADL's/sport specific activities with no greater than 2/10 pain. Partially Met  - Increase Functional Status Measure (FOTO) to: anticipated on discharge. Met  - Demonstrate independence and compliant with HEP. Partially Met  - Demonstrate a squat and or sit to stand with good mechanics and eccentric control without pain/difficulty/compensation. Partially Met  - Demonstrate functional activities with good core and glute strength without compensation/pain/difficulty. Met   - Lift > 25 pounds and perform work related tasks with proper mechanics. Partially Met   - Patient will be able to demonstrate good gait mechanics without compensations.  Met    Plan  Patient would benefit from: skilled PT  Planned modality interventions: cryotherapy and electrical stimulation/Russian stimulation  Other planned modality interventions: moist heat  Planned therapy interventions: joint mobilization, manual therapy, neuromuscular re-education, patient education, strengthening, stretching, therapeutic activities, therapeutic exercise, home exercise program, functional ROM exercises, Vences taping, postural training, balance/weight bearing training, body mechanics training, flexibility, massage, kinesiology taping, IASTM, transfer training and nerve gliding  Frequency: 1-2x/week. Duration in weeks: 4  Treatment plan discussed with: patient, PTA and referring physician        Subjective Evaluation    History of Present Illness  Mechanism of injury: Lucina Israel is a 44y.o. year-old female who presents to outpatient PT with reports of low back pain that started since she was pregnant with her second to last kid (about 3 years ago). Patient reports since her most recent pregnancy (about 8 months), she has also noticed weakness in her Left leg. Patient saw family med doctor and was recommended for PT at this time.           Recurrent probem    Quality of life: good    Patient Goals  Patient goals for therapy: decreased pain, increased motion, improved balance, increased strength, independence with ADLs/IADLs and return to sport/leisure activities    Pain  Current pain ratin  At best pain ratin  At worst pain ratin  Location: low back, Left LE weakness  Quality: discomfort  Aggravating factors: sitting, standing, stair climbing, walking and lifting  Progression: improved    Social Support  Steps to enter house: yes  Stairs in house: yes   Lives with: spouse and young children (6 kids)    Employment status: working (works at [employer])    Diagnostic Tests  No diagnostic tests performed  Treatments  Current treatment: physical therapy        Objective     Concurrent Complaints  Negative for disturbed sleep, bladder dysfunction, bowel dysfunction and saddle (S4) numbness    Postural Observations  Seated posture: good  Standing posture: good    Additional Postural Observation Details  Standing posture: increased lumbar lordosis, Left hip slightly elevated, Right knee slightly flexed, Right foot slightly pronated  Gait Mechanics: steady reciprocal gait pattern; decreased trunk rotation bilaterally and stiff UE; good arm swing bilaterally    Palpation   Left   Muscle spasm in the erector spinae, lumbar paraspinals and quadratus lumborum. Tenderness of the erector spinae, lumbar paraspinals and quadratus lumborum. Right   Muscle spasm in the erector spinae, lumbar paraspinals and quadratus lumborum. Tenderness of the erector spinae, lumbar paraspinals and quadratus lumborum. Tenderness     Left Hip   Tenderness in the PSIS. Right Hip   Tenderness in the PSIS. Active Range of Motion     Lumbar   Flexion:  WFL  Extension: 12 degrees  with pain  Left lateral flexion:  WFL  Right lateral flexion:  WFL  Left rotation:  WFL  Right rotation:  WFL    Passive Range of Motion     Additional Passive Range of Motion Details  Passive SKTC, hip flexor, and hamstring moderately tight bilaterally but did not increase in pain    Joint Play     Hypomobile: L1, L2, L3, L4 and L5     Pain: L1, L2, L3, L4 and L5   Mechanical Assessment    Cervical      Thoracic      Lumbar    Standing flexion: repeated movements   Pain location:no change  Standing extension: repeated movements  Pain location: no change    Strength/Myotome Testing     Left Hip   Planes of Motion   Flexion: 3+  Extension: 3  Abduction: 3+  Adduction: 3+  External rotation: 3+  Internal rotation: 3+    Right Hip   Planes of Motion   Flexion: 4-  Extension: 3  Abduction: 4-  Adduction: 4-  External rotation: 4-  Internal rotation: 4-    Tests     Lumbar     Left   Negative passive SLR. Right   Negative passive SLR. Left Hip   Negative MONIKA and FADIR. Right Hip   Negative MONIKA and FADIR.      Additional Tests Details  Supine to Long Sit: Left short to long (PSL)             Diagnosis: chronic low back pain  Precautions: prior pregnancies  ( * asterisk indicates given per HEP)  Next Physician Appointment:   Maurizio Marinelli:     Manuals 8/15 8/17 8/21 8/24            STM TH               IASTM                Joint Mobs                                Neuro Re-Ed                PPT                TA ball press                Hip ADD ball squeezes                BKFO / clamshells                Glut Squeezes                [place] x2' ea 2x ea x2' ea             Foam Balance                SLS 3x30" ea  foam 3x30"  Ea foam                              Ther Ex                LTRs                HS stretch                DKTC w/ ball                Heel Raises                Standing Hip 3-way   ABD, Ext x20ea             Seated p-ball rollouts  2x10              Standing SBing stretch                Open books  10x                              Ther Activity                Bike for LE mobility, endurance recum  5x recum  5x recum  5x             TRX squats   2x10             [place] walkouts                [employer] Press   10# x10ea             Leg press 70#  10x2               X-walks                                Pt Ed  HEP  HEP, POC            Re-Evaluation    DK            Modalities                Heat/ice (PRN)                TENS?

## 2023-08-28 ENCOUNTER — OFFICE VISIT (OUTPATIENT)
Dept: FAMILY MEDICINE CLINIC | Facility: CLINIC | Age: 38
End: 2023-08-28
Payer: MEDICARE

## 2023-08-28 ENCOUNTER — APPOINTMENT (OUTPATIENT)
Dept: PHYSICAL THERAPY | Facility: CLINIC | Age: 38
End: 2023-08-28
Payer: MEDICARE

## 2023-08-28 DIAGNOSIS — R73.01 IMPAIRED FASTING GLUCOSE: Primary | ICD-10-CM

## 2023-08-28 DIAGNOSIS — G89.29 CHRONIC BILATERAL LOW BACK PAIN WITHOUT SCIATICA: ICD-10-CM

## 2023-08-28 DIAGNOSIS — M54.50 CHRONIC BILATERAL LOW BACK PAIN WITHOUT SCIATICA: ICD-10-CM

## 2023-08-28 PROCEDURE — 99213 OFFICE O/P EST LOW 20 MIN: CPT

## 2023-08-28 NOTE — PROGRESS NOTES
Assessment/Plan:    Chronic bilateral low back pain without sciatica  · Continue Naproxen prn. Discussed taking medication with food  · Continue PT and OMT   · Discussed the importance of exercises and how it can help reduce back pain  · Recommend increasing to 2 vegetables per day  · Follow up weight management  · Follow up lumbar xrays  · Follow up in  4 weeks for back pain/lifestyle   · Hemoglobin A1c ordered for impaired fasting glucose    BMI 40.0-44.9, adult ([address])  · Patient pre-contemplative when it comes to exercise and cutting out exercise completely  · Overall, she feels like her eating habits have gotten better  · Current goal: Increase from 1 vegetable at dinner to 2 vegetables at dinner    At next visit consider the following questions/goals:  · What is standing in the way of becoming more active for you? · Keep a food log  · Increase from 3 servings of vegetables to 5 servings of vegetables  · Add beans daily    Impaired fasting glucose  F/u HbgA1c       Diagnoses and all orders for this visit:    Impaired fasting glucose    Chronic bilateral low back pain without sciatica    BMI 40.0-44.9, adult [facility])        Nutrition Assessment and Intervention:     New Nutrition Prescription completed with patient      Other interventions: Increase to at least 2 vegetable servings at dinner. Subjective:      Patient ID: Faith Ceja is a 43 y.o. female. Back Pain  Pertinent negatives include no numbness. 44 yo female presents for follow up for lower back pain, lifestyle changes. Since last office visit in 07/31, patient has not scheduled a visit with weight management and has not gotten her back xrays done. She started PT and noticed improvement in her back pain. She has not been taking Naproxen on a daily basis and will only take it if it is lots of pain. She does not like to take pills.  She did not a flare up in her back pain after her OMT appointment but is interested in continuing with OMT and PT. Reports intermittent lower back pain, made worse with leaning backwards. Pain is both sharp and dull and across the entire low back. No history of injury. She has not gotten her X-rays since last office visit. Denies any numbness or weakness. She denies trying to exercise because her back pain is too much. She has reduced the amount of soda from 2 drinks daily to 1 drinks daily. Lifestyle:  She met her lifestyle goal of decreasing her soda intake from 2 drinks down to 1 drink per day. She is not interested in decreasing her soda consumption any further at this time. She eats about two meals per day, usually lunch and dinner. She reports eating lunch between 9-10:30am, which is usually a mixed hoagies bowel that is similar to a salad at [address] [place] [address], which is where she works. Lunch time-free hoagies at lunch but eats in a bowel. She eats dinner at 5pm, usually includes Steak, chicken, pork chop, rice pasta, potatoes. Notes that she usually eats more potatoes in her family. Does eat at least 1 serving of vegetables at dinner, usually carrots, peas, broccoli. Discussed getting updated HA1c. The following portions of the patient's history were reviewed and updated as appropriate: allergies, current medications, past family history, past medical history, past social history, past surgical history and problem list.    Review of Systems   Musculoskeletal: Positive for back pain and neck pain. Neurological: Negative for numbness. Objective:      /76 (BP Location: Left arm, Patient Position: Sitting, Cuff Size: Large)   Pulse 88   Temp 98.9 °F (37.2 °C) (Tympanic)   Ht 5' (1.524 m)   Wt 101 kg (223 lb 9.6 oz)   SpO2 98%   BMI 43.67 kg/m²          Physical Exam  Vitals and nursing note reviewed. Constitutional:       Appearance: Normal appearance. Comments: Body mass index is 43.67 kg/m². Weight 223lbs 9.6oz. Cardiovascular:      Rate and Rhythm: Normal rate and regular rhythm. Pulmonary:      Effort: Pulmonary effort is normal. No respiratory distress. Breath sounds: No wheezing, rhonchi or rales. Musculoskeletal:      Lumbar back: Spasms present. No tenderness or bony tenderness. Comments: Negative straight leg test  Extremely hypertonicity in lumbar back paraspinal muscles bilaterally    Tight hamstrings, worse on right leg than left   Neurological:      Mental Status: She is alert.

## 2023-08-31 ENCOUNTER — OFFICE VISIT (OUTPATIENT)
Dept: PHYSICAL THERAPY | Facility: CLINIC | Age: 38
End: 2023-08-31
Payer: MEDICARE

## 2023-08-31 DIAGNOSIS — M54.50 ACUTE EXACERBATION OF CHRONIC LOW BACK PAIN: Primary | ICD-10-CM

## 2023-08-31 DIAGNOSIS — R29.898 WEAKNESS OF LEFT LEG: ICD-10-CM

## 2023-08-31 DIAGNOSIS — G89.29 ACUTE EXACERBATION OF CHRONIC LOW BACK PAIN: Primary | ICD-10-CM

## 2023-08-31 PROCEDURE — 97110 THERAPEUTIC EXERCISES: CPT

## 2023-08-31 NOTE — PROGRESS NOTES
Daily Note     Today's date: 2023  Patient name: Faith Ceja  : 1985  MRN: [id number]  Referring provider: May Vigil, PT  Dx:   Encounter Diagnosis     ICD-10-CM    1. Acute exacerbation of chronic low back pain  M54.50     G89.29       2. Weakness of left leg  R29.898                      Subjective: Patient states she is doing okay today. Objective: See treatment diary below      Assessment: Tolerated treatment well. Resumed with POC as able. Denied pain throughout session. Patient demonstrated fatigue post treatment, exhibited good technique with therapeutic exercises and would benefit from continued PT      Plan: Continue per plan of care.       Diagnosis: chronic low back pain  Precautions: prior pregnancies  ( * asterisk indicates given per HEP)  Next Physician Appointment:   Parul Roger:     Manuals 8/15 8/17 8/21 8/24 8/31           STM TH               [facility]                Joint Mobs                                Neuro Re-Ed                PPT                TA ball press                Hip ADD ball squeezes                BKFO / clamshells                Glut Squeezes                [employer] x2' ea 2x ea x2' ea  x2' ea            Foam Balance                SLS 3x30" ea  foam 3x30"  Ea foam   3x30"  Ea foam                           Ther Ex                LTRs                HS stretch                DKTC w/ ball                Heel Raises                Standing Hip 3-way   ABD, Ext x20ea  ABD, ext x20 ea           Seated p-ball rollouts  2x10              Standing SBing stretch                Open books  10x                              Ther Activity                Bike for LE mobility, endurance recum  5x recum  5x recum  5x             TRX squats   2x10  2x10            [place] walkouts                [place] Pal[employer] Press   10# x10ea  10# x10ea            Leg press 70#  10x2               X-walks                                Pt Ed  HEP  HEP, POC Re-Evaluation    DK            Modalities                Heat/ice (PRN)                TENS?

## 2023-09-04 VITALS
WEIGHT: 223.6 LBS | HEART RATE: 88 BPM | OXYGEN SATURATION: 98 % | TEMPERATURE: 98.9 F | BODY MASS INDEX: 43.9 KG/M2 | HEIGHT: 60 IN | DIASTOLIC BLOOD PRESSURE: 76 MMHG | SYSTOLIC BLOOD PRESSURE: 124 MMHG

## 2023-09-04 NOTE — ASSESSMENT & PLAN NOTE
· Patient pre-contemplative when it comes to exercise and cutting out exercise completely  · Overall, she feels like her eating habits have gotten better  · Current goal: Increase from 1 vegetable at dinner to 2 vegetables at dinner    At next visit consider the following questions/goals:  · What is standing in the way of becoming more active for you?   · Keep a food log  · Increase from 3 servings of vegetables to 5 servings of vegetables  · Add beans daily

## 2023-09-04 NOTE — ASSESSMENT & PLAN NOTE
· Continue Naproxen prn.  Discussed taking medication with food  · Continue PT and OMT   · Discussed the importance of exercises and how it can help reduce back pain  · Recommend increasing to 2 vegetables per day  · Follow up weight management  · Follow up lumbar xrays  · Follow up in  4 weeks for back pain/lifestyle   · Hemoglobin A1c ordered for impaired fasting glucose

## 2023-09-07 ENCOUNTER — APPOINTMENT (OUTPATIENT)
Dept: PHYSICAL THERAPY | Facility: CLINIC | Age: 38
End: 2023-09-07
Payer: MEDICARE

## 2023-09-07 ENCOUNTER — TELEPHONE (OUTPATIENT)
Dept: FAMILY MEDICINE CLINIC | Facility: CLINIC | Age: 38
End: 2023-09-07

## 2023-09-07 NOTE — TELEPHONE ENCOUNTER
Caller: Patient       Doctor: Rodney Solorzano      Reason for call: Patient call stating that physical therapy told her that her insurance is not going to cover physical therapy she is not sure if she needs a need referral or if the insurance is not going to cover it at all. Patient would like to know what to do.       Number: [phone]

## 2023-09-11 ENCOUNTER — OFFICE VISIT (OUTPATIENT)
Dept: PHYSICAL THERAPY | Facility: CLINIC | Age: 38
End: 2023-09-11
Payer: MEDICARE

## 2023-09-11 DIAGNOSIS — M54.50 ACUTE EXACERBATION OF CHRONIC LOW BACK PAIN: Primary | ICD-10-CM

## 2023-09-11 DIAGNOSIS — G89.29 ACUTE EXACERBATION OF CHRONIC LOW BACK PAIN: Primary | ICD-10-CM

## 2023-09-11 DIAGNOSIS — R29.898 WEAKNESS OF LEFT LEG: ICD-10-CM

## 2023-09-11 PROCEDURE — 97110 THERAPEUTIC EXERCISES: CPT

## 2023-09-11 PROCEDURE — 97112 NEUROMUSCULAR REEDUCATION: CPT

## 2023-09-11 NOTE — PROGRESS NOTES
Daily Note     Today's date: 2023  Patient name: Geronimo Ng  : 1985  MRN: [id number]  Referring provider: Namrata Hay PT  Dx:   Encounter Diagnosis     ICD-10-CM    1. Acute exacerbation of chronic low back pain  M54.50     G89.29       2. Weakness of left leg  R29.898                      Subjective: Pt states that she has been sore since she had her occupational therapy massage last week. She reports that she has been feeling the off and on pain like she did before so she is concerned about the massage tomorrow. Objective: See treatment diary below      Assessment: Tolerated treatment well. Progressed pt with core and hip strengthening as tolerated. Pt had no reports of increased pain sx's throughout. Stretching also added to address her stiffness/soreness. Muscle fatigue noted with x-walks especially on the L side. Pt reports compliance with her HEP. Will progress as able.         Plan: Continue per plan of care     Diagnosis: chronic low back pain  Precautions: prior pregnancies  ( * asterisk indicates given per HEP)  Next Physician Appointment:   Chang Aaron:     Manuals 8/15 8/17 8/21 8/24 8/31 9/11          STM TH               [facility]                Joint Mobs                                Neuro Re-Ed                PPT                TA ball press                Hip ADD ball squeezes                BKFO / clamshells                Glut Squeezes                [employer] x2' ea 2x ea x2' ea  x2' ea  2' ea          Foam Balance                SLS 3x30" ea  foam 3x30"  Ea foam   3x30"  Ea foam 3x30"  Ea  foam                          Ther Ex                LTRs                HS stretch      10x10"          DKTC w/ ball                Heel Raises                Standing Hip 3-way   ABD, Ext x20ea  ABD, ext x20 ea           Seated p-ball rollouts  2x10              Standing SBing stretch                Open books  10x              Hip flexor stretch      10x10" ea Ther Activity                Bike for LE mobility, endurance recum  5x recum  5x recum  5x   recum  5x          TRX squats   2x10  2x10            [place] walkouts                [place] Pal[employer] Press   10# x10ea  10# x10ea            Leg press 70#  10x2     90#  10x2          X-walks      RTB 2x                          Pt Ed  HEP  HEP, POC            Re-Evaluation    DK            Modalities                Heat/ice (PRN)                TENS?

## 2023-09-12 ENCOUNTER — PROCEDURE VISIT (OUTPATIENT)
Dept: FAMILY MEDICINE CLINIC | Facility: CLINIC | Age: 38
End: 2023-09-12
Payer: MEDICARE

## 2023-09-12 DIAGNOSIS — M99.02 SOMATIC DYSFUNCTION OF SPINE, THORACIC: ICD-10-CM

## 2023-09-12 DIAGNOSIS — M99.03 SOMATIC DYSFUNCTION OF LUMBAR REGION: ICD-10-CM

## 2023-09-12 DIAGNOSIS — M26.609 TEMPORAL MANDIBULAR JOINT DISORDER: ICD-10-CM

## 2023-09-12 DIAGNOSIS — M99.05 SOMATIC DYSFUNCTION OF PELVIS REGION: ICD-10-CM

## 2023-09-12 DIAGNOSIS — M99.00 SOMATIC DYSFUNCTION OF HEAD REGION: ICD-10-CM

## 2023-09-12 DIAGNOSIS — G89.29 CHRONIC BILATERAL LOW BACK PAIN WITHOUT SCIATICA: Primary | ICD-10-CM

## 2023-09-12 DIAGNOSIS — G43.909 MIGRAINE WITHOUT STATUS MIGRAINOSUS, NOT INTRACTABLE, UNSPECIFIED MIGRAINE TYPE: ICD-10-CM

## 2023-09-12 DIAGNOSIS — M54.50 CHRONIC BILATERAL LOW BACK PAIN WITHOUT SCIATICA: Primary | ICD-10-CM

## 2023-09-12 DIAGNOSIS — M99.01 SOMATIC DYSFUNCTION OF CERVICAL REGION: ICD-10-CM

## 2023-09-12 PROCEDURE — 99213 OFFICE O/P EST LOW 20 MIN: CPT

## 2023-09-12 RX ORDER — LIDOCAINE 50 MG/G
1 PATCH TOPICAL EVERY 24 HOURS
Qty: 15 PATCH | Refills: 0 | Status: SHIPPED | OUTPATIENT
Start: 2023-09-12

## 2023-09-12 NOTE — PROGRESS NOTES
The Assessment/Plan     This is a 43 y.o. female who presents for OMT follow-up for:  1. Chronic bilateral low back pain without sciatica  lidocaine (LIDODERM) 5 %    OMT      2. Migraine without status migrainosus, not intractable, unspecified migraine type  Ambulatory Referral to Neurology    OMT      3. Temporal mandibular joint disorder  OMT      4. Somatic dysfunction of lumbar region  OMT      5. Somatic dysfunction of head region  OMT      6. Somatic dysfunction of cervical region  OMT      7. Somatic dysfunction of spine, thoracic  OMT      8. Somatic dysfunction of pelvis region  OMT           1. Patient tolerated OMT well for the above problems,  advised patient to drink fluids and can use NSAID for soreness after treatment     2. OMT Follow up in 2 weeks. 3. Refilled Lidocaine patches    4. Neurology referral for migraines. Migraines may be triggered from TMJ. Patient has difficulty paying for sumatriptan and therefore does not take it. Previously on Topamax without relief. Interested in Neurology for better medication control. 5.  F/u Lumbar back xrays    Subjective     Lucina Werner is a 43 y.o. female and is here for a OMT follow up. The patient reports b/l lumbar back pain. Denies any numbness or tingling. Denies any new trauma. She reports not taking Ibuprofen or Tylenol due to not liking to take medications. She has not had a script for the lidocaine patches. She is going to PT 1x per week due to insurance not covering PT. Reports Migraines once per month. Last migraine was end of August. Usually lasts 2-4 days. Usually on right, in temporal region. Assoicate with photophobia and phonophobia. Not associated with N/V. Is the patient taking Pain medication? no  Has the patient completed physical therapy for this condition? yes-completing right now  Did Patient symptoms improve from last OMT appointment?  yes    The following portions of the patient's history were reviewed and updated as appropriate: allergies, current medications, past family history, past medical history, past social history, past surgical history and problem list.    Review of Systems  Review of Systems   Eyes: Positive for photophobia. Negative for visual disturbance. Gastrointestinal: Negative for nausea and vomiting. Musculoskeletal: Positive for arthralgias, back pain and gait problem. Negative for myalgias and neck pain. Neurological: Positive for headaches. Negative for dizziness, light-headedness and numbness. Objective     OMT Exam     OMT    Performed by: Ede Casey DO  Authorized by: Ede Casey DO  [place] Protocol:  Consent given by: patient  Time out: Immediately prior to procedure a "time out" was called to verify the correct patient, procedure, equipment, support staff and site/side marked as required. Patient identity confirmed: verbally with patient        Procedure Details:     Region evaluated and treated:  Head, Cervical, Lumbar, Thoracic and Pelvis Innominate    Thoracic Information  Thoracic Region: T1 - T4  Head Details:     Examination Method:  Tissue Texture Change, Stability, Laxity, Effusions, Tone, Range of Motion, Contracture, Asymmetry, Misalignment, Crepitation, Defects, Masses and Tenderness, Pain    Osteopathic Findings:  TMJ deviation to the right  Hypertonicity of right paraspinal     Treatment Method:  Soft Tissue Treatment, Myofascial Release Treatment, Muscle Energy Treatment, Indirect Treatment, Counterstrain Treatment and Direct Treatment    Response:  Improved - The somatic dysfunction is improved but not completely resolved.     Cervical Details:     Examination Method:  Tissue Texture Change, Stability, Laxity, Effusions, Tone, Range of Motion, Contracture, Asymmetry, Misalignment, Crepitation, Defects, Masses and Tenderness, Pain    Severity:  Mild    Osteopathic Findings:  Right cervical paraspinal hypertonicity   C spine SRRL  C6 transverse left tender point    Treatment Method:  Muscle Energy Treatment, Soft Tissue Treatment, Counterstrain Treatment and Indirect Treatment    Thoracic T1 - T4 details:     Examination Method:  Tissue Texture Change, Stability, Laxity, Effusions, Tone, Range of Motion, Contracture, Asymmetry, Misalignment, Crepitation, Defects, Masses and Tenderness, Pain    Severity:  Moderate    Osteopathic Findings:  Right hypertonicity of trapezius   T 4-6 RLSR     Treatment Method:  Direct Treatment, High Velocity, Low Amplitude Treatment, Muscle Energy Treatment, Soft Tissue Treatment and Indirect Treatment    Response:  Improved    Lumbar details:     Examination Method:  Tissue Texture Change, Stability, Laxity, Effusions, Tone, Range of Motion, Contracture, Asymmetry, Misalignment, Crepitation, Defects, Masses and Tenderness, Pain    Severity:  Severe    Osteopathic Findings:  Bilateral hypertonicity of paraspinal lumbar muscles    Treatment Method:  High Velocity, Low Amplitude Treatment, Muscle Energy Treatment, Soft Tissue Treatment and Indirect Treatment    Response:  Improved - The somatic dysfunction is improved but not completely resolved. Pelvis Innominate details:     Examination Method:  Asymmetry, Misalignment, Crepitation, Defects, Masses and Tenderness, Pain    Osteopathic Findings:  Left posterior innominate     Treatment Method:  High Velocity, Low Amplitude Treatment, Muscle Energy Treatment and Direct Treatment    Response:  Resolved - The somatic dysfunction is completely resolved without evidence of it ever having been present. Total Regions Treated:  5  Attending provider present in exam room for procedure:  No

## 2023-09-12 NOTE — PATIENT INSTRUCTIONS
Lower Back Exercises   AMBULATORY CARE:   Lower back exercises  help heal and strengthen your back muscles to prevent another injury. Ask your healthcare provider if you need to see a physical therapist for more advanced exercises. Seek care immediately if:   You have severe pain that prevents you from moving. Call your doctor if:   Your pain becomes worse. You have new pain. You have questions or concerns about your condition or care. Do lower back exercises safely:   Do the exercises on a mat or firm surface (not on a bed). A firm surface will support your spine and prevent low back pain. Move slowly and smoothly. Avoid fast or jerky motions. Breathe normally. Do not hold your breath. Stop if you feel pain. It is normal to feel some discomfort at first, but you should not feel pain. Regular exercise will help decrease your discomfort over time. Lower back exercises: Your healthcare provider may recommend that you do back exercises 10 to 30 minutes each day. He or she may also recommend that you do exercises 1 to 3 times each day. Ask your provider which exercises are best for you and how often to do them. Ankle pumps:  Lie on your back. Move your foot up (with your toes pointing toward your head). Then, move your foot down (with your toes pointing away from you). Repeat this exercise 10 times on each side. Heel slides:  Lie on your back. Slowly bend one leg and then straighten it. Next, bend the other leg and then straighten it. Repeat 10 times on each side. Pelvic tilt:  Lie on your back with your knees bent and feet flat on the floor. Place your arms in a relaxed position beside your body. Tighten the muscles of your abdomen and flatten your back against the floor. Hold for 5 seconds. Repeat 5 times. Back stretch:  Lie on your back with your hands behind your head. Bend your knees and turn the lower half of your body to one side.  Hold this position for 10 seconds. Repeat 3 times on each side. Straight leg raises:  Lie on your back with one leg straight. Bend the other knee. Tighten your abdomen and then slowly lift the straight leg up about 6 to 12 inches off the floor. Hold for 1 to 5 seconds. Lower your leg slowly. Repeat 10 times on each leg. Knee-to-chest:  Lie on your back with your knees bent and feet flat on the floor. Pull one of your knees toward your chest and hold it there for 5 seconds. Return your leg to the starting position. Lift the other knee toward your chest and hold for 5 seconds. Do this 5 times on each side. Cat and camel:  Place your hands and knees on the floor. Arch your back upward toward the ceiling and lower your head. Round out your spine as much as you can. Hold for 5 seconds. Lift your head upward and push your chest downward toward the floor. Hold for 5 seconds. Do 3 sets or as directed. Wall squats:  Stand with your back against a wall. Tighten the muscles of your abdomen. Slowly lower your body until your knees are bent at a 45 degree angle. Hold this position for 5 seconds. Slowly move back up to a standing position. Repeat 10 times. Curl up:  Lie on your back with your knees bent and feet flat on the floor. Place your hands, palms down, underneath the curve in your lower back. Next, with your elbows on the floor, lift your shoulders and chest 2 to 3 inches. Keep your head in line with your shoulders. Hold this position for 5 seconds. When you can do this exercise without pain for 10 to 15 seconds, you may add a rotation. While your shoulders and chest are lifted off the ground, turn slightly to the left and hold. Repeat on the other side. Bird dog:  Place your hands and knees on the floor. Keep your wrists directly below your shoulders and your knees directly below your hips. Pull your belly button in toward your spine. Do not flatten or arch your back. Tighten your abdominal muscles. Raise one arm straight out so that it is aligned with your head. Next, raise the leg opposite your arm. Hold this position for 15 seconds. Lower your arm and leg slowly and change sides. Do 5 sets. Follow up with your doctor as directed:  Write down your questions so you remember to ask them during your visits. © Copyright [employer] 2022 Information is for End User's use only and may not be sold, redistributed or otherwise used for commercial purposes. The above information is an  only. It is not intended as medical advice for individual conditions or treatments. Talk to your doctor, nurse or pharmacist before following any medical regimen to see if it is safe and effective for you.

## 2023-09-14 ENCOUNTER — APPOINTMENT (OUTPATIENT)
Dept: PHYSICAL THERAPY | Facility: CLINIC | Age: 38
End: 2023-09-14
Payer: MEDICARE

## 2023-09-18 ENCOUNTER — APPOINTMENT (OUTPATIENT)
Dept: PHYSICAL THERAPY | Facility: CLINIC | Age: 38
End: 2023-09-18
Payer: MEDICARE

## 2023-09-25 ENCOUNTER — APPOINTMENT (OUTPATIENT)
Dept: PHYSICAL THERAPY | Facility: CLINIC | Age: 38
End: 2023-09-25
Payer: MEDICARE

## 2023-09-26 ENCOUNTER — OFFICE VISIT (OUTPATIENT)
Dept: PHYSICAL THERAPY | Facility: CLINIC | Age: 38
End: 2023-09-26
Payer: MEDICARE

## 2023-09-26 DIAGNOSIS — G89.29 ACUTE EXACERBATION OF CHRONIC LOW BACK PAIN: Primary | ICD-10-CM

## 2023-09-26 DIAGNOSIS — M54.50 ACUTE EXACERBATION OF CHRONIC LOW BACK PAIN: Primary | ICD-10-CM

## 2023-09-26 DIAGNOSIS — R29.898 WEAKNESS OF LEFT LEG: ICD-10-CM

## 2023-09-26 PROCEDURE — 97530 THERAPEUTIC ACTIVITIES: CPT

## 2023-09-26 PROCEDURE — 97112 NEUROMUSCULAR REEDUCATION: CPT

## 2023-09-26 NOTE — PROGRESS NOTES
Daily Note     Today's date: 2023  Patient name: Semaj Muhammad  : 1985  MRN: [id number]  Referring provider: Erin Harvey, PT  Dx:   Encounter Diagnosis     ICD-10-CM    1. Acute exacerbation of chronic low back pain  M54.50     G89.29       2. Weakness of left leg  R29.898           Start Time: 1630  Stop Time: 1700  Total time in clinic (min): 30 minutes    Subjective: Patient reports she only has pain right before she gets her period, otherwise pain levels have improved. She reports she continues to go for OMT. Objective: See treatment diary below      Assessment: Tolerated treatment well. Patient exhibited good technique with therapeutic exercises. Patient performed exercises with good core control and form. Occasional cuing for proper form with paloff press. Plan: Potential discharge next visit. Patient was recommended to follow-up with OBGYN due to low back primarily during PMS, otherwise not present with other functional / mechanical tasks.      Diagnosis: chronic low back pain  Precautions: prior pregnancies  ( * asterisk indicates given per HEP)  Next Physician Appointment:   Kajal Rowe:     Manuals 8/15 8/17 8/21 8/24 8/31 9/11 9/26         STM TH               IASTM                Joint Mobs                                Neuro Re-Ed                PPT                TA ball press                Hip ADD ball squeezes                BKFO / clamshells                Glut Squeezes                [employer] x2' ea 2x ea x2' ea  x2' ea  2' ea 2' ea         Foam Balance                SLS 3x30" ea  foam 3x30"  Ea foam   3x30"  Ea foam 3x30"  Ea  foam                          Ther Ex                LTRs                HS stretch      10x10"          DKTC w/ ball                Heel Raises                Standing Hip 3-way   ABD, Ext x20ea  ABD, ext x20 ea           Seated p-ball rollouts  2x10              Standing SBing stretch                Open books  10x Hip flexor stretch      10x10" ea          Ther Activity                Bike for LE mobility, endurance recum  5x recum  5x recum  5x   recum  5x recum  5x         TRX squats   2x10  2x10   2x10         Andreina walkouts                Andreina Paloff Press   10# x10ea  10# x10ea   10# x10ea          Leg press 70#  10x2     90#  10x2          X-walks      RTB 2x RTB 2x                         Pt Ed  HEP  HEP, POC   POC         Re-Evaluation    DK            Modalities                Heat/ice (PRN)                TENS?

## 2023-09-27 ENCOUNTER — PROCEDURE VISIT (OUTPATIENT)
Dept: FAMILY MEDICINE CLINIC | Facility: CLINIC | Age: 38
End: 2023-09-27
Payer: MEDICARE

## 2023-09-27 DIAGNOSIS — M99.05 SOMATIC DYSFUNCTION OF PELVIS REGION: ICD-10-CM

## 2023-09-27 DIAGNOSIS — M99.01 SOMATIC DYSFUNCTION OF CERVICAL REGION: ICD-10-CM

## 2023-09-27 DIAGNOSIS — M99.03 SOMATIC DYSFUNCTION OF LUMBAR REGION: ICD-10-CM

## 2023-09-27 DIAGNOSIS — G43.909 MIGRAINE WITHOUT STATUS MIGRAINOSUS, NOT INTRACTABLE, UNSPECIFIED MIGRAINE TYPE: Primary | ICD-10-CM

## 2023-09-27 PROCEDURE — 98926 OSTEOPATH MANJ 3-4 REGIONS: CPT

## 2023-09-27 NOTE — PROGRESS NOTES
The Assessment/Plan     This is a 43 y.o. female who presents for OMT follow-up for:  1. Migraine without status migrainosus, not intractable, unspecified migraine type  OMT      2. Somatic dysfunction of lumbar region  OMT      3. Somatic dysfunction of cervical region  OMT      4. Somatic dysfunction of pelvis region  OMT           1. Patient tolerated OMT well for the above problems,  advised patient to drink fluids and can use NSAID for soreness after treatment     2. OMT Follow up in 2 weeks. Juan Xavier is a 43 y.o. female and is here for a OMT follow up. The patient reports non-radiating, mid-to-low back pain that is constant. Onset of pain occurred at the end of January 2023 after a tubal ligation. Today, her pain is a 1-2/10. She has tried Tylenol and ibuprofen with out relief of symptoms. She has not tried topical therapies including heat, ice, and lidocaine patches. Aggravating factors include standing for long periods of time and lifting objects. She notes that her back pain worsens the week prior to menstruation. Left leg weakness has been a concern since her last pregnancy. She states that it has been present during all of her pregnancies, but failed to resolve after her most recent pregnancy in 2022. She is scheduled to complete PT next week, and believes it is helping her leg weakness but not her leg pain. No associated numbness, tingling, or incontinence. X-ray not completed since last visit. Patient also has a history of migraines that occur approximately once per month. Her last migraine was in August. She has not had any migraines since her last OMT visit. The migraines are typically right-sided and will last 2-4 days. She has sumatriptan as needed. No known triggers. Is the patient taking Pain medication? yes  Has the patient completed physical therapy for this condition? yes  Did Patient symptoms improve from last OMT appointment?  yes    The following portions of the patient's history were reviewed and updated as appropriate: allergies, current medications, past family history, past medical history, past social history, past surgical history and problem list.    Review of Systems  Review of Systems   Constitutional: Negative for activity change, fatigue and fever. Genitourinary: Negative for difficulty urinating. Musculoskeletal: Positive for back pain. Negative for joint swelling. Neurological: Positive for weakness (left leg) and headaches. Negative for syncope, light-headedness and numbness. Objective     OMT Exam     OMT    Performed by: Andres Lanier DO  Authorized by: Andres Lanier DO  Universal Protocol:  Procedure performed by:  Consent: Verbal consent obtained. Risks and benefits: risks, benefits and alternatives were discussed  Consent given by: patient  Patient identity confirmed: verbally with patient        Procedure Details:     Region evaluated and treated:  Cervical, Sacrum/Pelvis and Lumbar    Cervical Details:     Examination Method:  Tissue Texture Change, Stability, Laxity, Effusions, Tone and Tenderness, Pain    Severity:  Moderate    Osteopathic Findings:  Scalene hypertonicity  OA restriction    Treatment Method:  Myofascial Release Treatment, Soft Tissue Treatment and Muscle Energy Treatment    Response:  Improved - The somatic dysfunction is improved but not completely resolved. Lumbar details:     Examination Method:  Tissue Texture Change, Stability, Laxity, Effusions, Tone, Asymmetry, Misalignment, Crepitation, Defects, Masses and Tenderness, Pain    Severity:  Moderate    Osteopathic Findings:  Bilateral hypertonicity of low thoracic and lumbar spine L>R    Treatment Method:  Myofascial Release Treatment, Muscle Energy Treatment and Soft Tissue Treatment    Response:  Improved - The somatic dysfunction is improved but not completely resolved.     Sacrum/Pelvis details:     Examination Method:  Tissue Texture Change, Stability, Laxity, Effusions, Tone and Tenderness, Pain    Osteopathic Findings:  Bilaterally hypertonic piriformis R>L    Treatment Method:  Direct Treatment and Soft Tissue Treatment    Response:  Improved - The somatic dysfunction is improved but not completely resolved.     Total Regions Treated:  3

## 2023-10-02 ENCOUNTER — APPOINTMENT (OUTPATIENT)
Dept: PHYSICAL THERAPY | Facility: CLINIC | Age: 38
End: 2023-10-02
Payer: MEDICARE

## 2023-10-03 ENCOUNTER — OFFICE VISIT (OUTPATIENT)
Dept: PHYSICAL THERAPY | Facility: CLINIC | Age: 38
End: 2023-10-03
Payer: MEDICARE

## 2023-10-03 DIAGNOSIS — R29.898 WEAKNESS OF LEFT LEG: ICD-10-CM

## 2023-10-03 DIAGNOSIS — G89.29 ACUTE EXACERBATION OF CHRONIC LOW BACK PAIN: Primary | ICD-10-CM

## 2023-10-03 DIAGNOSIS — M54.50 ACUTE EXACERBATION OF CHRONIC LOW BACK PAIN: Primary | ICD-10-CM

## 2023-10-03 PROCEDURE — 97110 THERAPEUTIC EXERCISES: CPT

## 2023-10-03 PROCEDURE — 97112 NEUROMUSCULAR REEDUCATION: CPT

## 2023-10-03 NOTE — PROGRESS NOTES
Daily Note     Today's date: 10/3/2023  Patient name: Steve Ibarra  : 1985  MRN: [id number]  Referring provider: Beverley Ng, PT  Dx:   Encounter Diagnosis     ICD-10-CM    1. Acute exacerbation of chronic low back pain  M54.50     G89.29       2. Weakness of left leg  R29.898                      Subjective: Pt states that her mid back has been bothering her some, her LB is better but that can be up and down too. Objective: See treatment diary below      Assessment: Tolerated treatment well. Added TS self mobility to improve pt's mobility and decrease pain. Core strengthening performed with no reports of increased sx's. Muscle fatigue is noted with progressions. Pt progressing slowly towards her goals and will benefit from continued therapy.       Plan: Continue per plan of care     Diagnosis: chronic low back pain  Precautions: prior pregnancies  ( * asterisk indicates given per HEP)  Next Physician Appointment:   Higinio Silverio:     Manuals 8/15 8/17 8/21 8/24 8/31 9/11 9/26 10/3        STM TH               [facility]                Joint Mobs                                Neuro Re-Ed                PPT                TA ball press                Hip ADD ball squeezes                BKFO / clamshells                Glut Squeezes                Wobble Board x2' ea 2x ea x2' ea  x2' ea  2' ea 2' ea 2' ea        Foam Balance                SLS 3x30" ea  foam 3x30"  Ea foam   3x30"  Ea foam 3x30"  Ea  foam                          Ther Ex                LTRs                HS stretch      10x10"          DKTC w/ ball                Heel Raises                Standing Hip 3-way   ABD, Ext x20ea  ABD, ext x20 ea           Seated p-ball rollouts  2x10              Standing SBing stretch                TS ext        10x        TS rotation        10x        Open books  10x              Hip flexor stretch      10x10" ea          Ther Activity                Bike for LE mobility, endurance recum  5x recum  5x recum  5x   recum  5x recum  5x         TRX squats   2x10  2x10   2x10         Andreina walkouts                [place] Paloff Press   10# x10ea  10# x10ea   10# x10ea  10#  10x2 ea        Leg press 70#  10x2     90#  10x2  90#  10x3        X-walks      RTB 2x RTB 2x RTB 2x                        Pt Ed  HEP  HEP, POC   POC         Re-Evaluation    DK            Modalities                Heat/ice (PRN)                TENS?

## 2023-10-13 ENCOUNTER — PROCEDURE VISIT (OUTPATIENT)
Dept: FAMILY MEDICINE CLINIC | Facility: CLINIC | Age: 38
End: 2023-10-13
Payer: MEDICARE

## 2023-10-13 DIAGNOSIS — M54.50 CHRONIC BILATERAL LOW BACK PAIN WITHOUT SCIATICA: Primary | ICD-10-CM

## 2023-10-13 DIAGNOSIS — G89.29 CHRONIC BILATERAL LOW BACK PAIN WITHOUT SCIATICA: Primary | ICD-10-CM

## 2023-10-13 DIAGNOSIS — G43.909 MIGRAINE WITHOUT STATUS MIGRAINOSUS, NOT INTRACTABLE, UNSPECIFIED MIGRAINE TYPE: ICD-10-CM

## 2023-10-13 DIAGNOSIS — M99.03 SOMATIC DYSFUNCTION OF LUMBAR REGION: ICD-10-CM

## 2023-10-13 DIAGNOSIS — M99.01 SOMATIC DYSFUNCTION OF CERVICAL REGION: ICD-10-CM

## 2023-10-13 DIAGNOSIS — M99.02 SOMATIC DYSFUNCTION OF SPINE, THORACIC: ICD-10-CM

## 2023-10-13 PROCEDURE — 99213 OFFICE O/P EST LOW 20 MIN: CPT

## 2023-10-13 RX ORDER — METHOCARBAMOL 500 MG/1
500 TABLET, FILM COATED ORAL 4 TIMES DAILY
Qty: 28 TABLET | Refills: 0 | Status: SHIPPED | OUTPATIENT
Start: 2023-10-13 | End: 2023-10-20

## 2023-10-13 NOTE — PROGRESS NOTES
The Assessment/Plan     This is a 43 y.o. female who presents for OMT follow-up for:  1. Chronic bilateral low back pain without sciatica  methocarbamol (ROBAXIN) 500 mg tablet    OMT      2. Migraine without status migrainosus, not intractable, unspecified migraine type  Ambulatory Referral to Neurology    OMT      3. Somatic dysfunction of cervical region  OMT      4. Somatic dysfunction of lumbar region  OMT      5. Somatic dysfunction of spine, thoracic  OMT           1. Patient tolerated OMT well for the above problems,  advised patient to drink fluids and can use NSAID for soreness after treatment     2. OMT Follow up in 2 weeks. 3. Trial Robaxin    4. Neurology referral for migraines. Migraines may be triggered from TMJ. Patient has difficulty paying for sumatriptan and therefore does not take it. Previously on Topamax without relief. Interested in Neurology for better medication control. 5.  F/u Lumbar back xrays    Subjective     Lucina Mcelroy is a 43 y.o. female and is here for a OMT follow up. The patient reports b/l lumbar back pain. Denies any numbness or tingling. Denies any new trauma. She reports not taking Ibuprofen or Tylenol due to not liking to take medications. She has tired the lidocaine patches. She finished physical therapy. Last migraine on from October 4th  lasting until October 9th started on right side and traveled to the left side. Reports Migraines once per month. Last migraine was end of August. Usually lasts 2-4 days. Usually on right, in temporal region. Assoicate with photophobia and phonophobia. Not associated with N/V. Is the patient taking Pain medication? no  Has the patient completed physical therapy for this condition? yes-Finished  Did Patient symptoms improve from last OMT appointment?  yes    The following portions of the patient's history were reviewed and updated as appropriate: allergies, current medications, past family history, past medical history, past social history, past surgical history and problem list.    Review of Systems  Review of Systems   Eyes:  Positive for photophobia. Negative for visual disturbance. Gastrointestinal:  Negative for nausea and vomiting. Musculoskeletal:  Positive for arthralgias, back pain and gait problem. Negative for myalgias and neck pain. Neurological:  Positive for headaches. Negative for dizziness, light-headedness and numbness. Objective     OMT Exam     OMT    Performed by: Ernesto Weston DO  Authorized by: Ernesto Weston DO  Universal Protocol:  Consent: Verbal consent obtained. Risks and benefits: risks, benefits and alternatives were discussed  Consent given by: patient  Patient identity confirmed: verbally with patient      Procedure Details:     Region evaluated and treated:  Cervical, Lumbar and Thoracic    Thoracic Information  Thoracic Region: T1 - T4  Cervical Details:     Examination Method:  Tissue Texture Change, Stability, Laxity, Effusions, Tone and Asymmetry, Misalignment, Crepitation, Defects, Masses    Severity:  Mild    Osteopathic Findings:  Left cervical hypertrophic paraspinal muscles  SRRR    Treatment Method:  Direct Treatment, Indirect Treatment, Muscle Energy Treatment and Soft Tissue Treatment    Response:  Improved - The somatic dysfunction is improved but not completely resolved.     Thoracic T1 - T4 details:     Examination Method:  Tissue Texture Change, Stability, Laxity, Effusions, Tone and Asymmetry, Misalignment, Crepitation, Defects, Masses    Severity:  Mild    Osteopathic Findings:  Right trapezius hypertrophy and tenderness  T1-T4 RRSL    Treatment Method:  Soft Tissue Treatment, High Velocity, Low Amplitude Treatment, Direct Treatment and Indirect Treatment    Lumbar details:     Examination Method:  Tissue Texture Change, Stability, Laxity, Effusions, Tone, Asymmetry, Misalignment, Crepitation, Defects, Masses, Range of Motion, Contracture and Tenderness, Pain    Severity:  Moderate    Osteopathic Findings:  Quadratus tender spasm, worse on right than left  [id number]    Treatment Method:  Counterstrain Treatment, Direct Treatment, Muscle Energy Treatment, Soft Tissue Treatment and Indirect Treatment    Response:  Improved - The somatic dysfunction is improved but not completely resolved. Total Regions Treated:  3  Attending provider present in exam room for procedure:  No

## 2023-10-13 NOTE — PATIENT INSTRUCTIONS
Lower Back Exercises   AMBULATORY CARE:   Lower back exercises  help heal and strengthen your back muscles to prevent another injury. Ask your healthcare provider if you need to see a physical therapist for more advanced exercises. Seek care immediately if:   You have severe pain that prevents you from moving. Call your doctor if:   Your pain becomes worse. You have new pain. You have questions or concerns about your condition or care. Do lower back exercises safely:   Do the exercises on a mat or firm surface (not on a bed). A firm surface will support your spine and prevent low back pain. Move slowly and smoothly. Avoid fast or jerky motions. Breathe normally. Do not hold your breath. Stop if you feel pain. It is normal to feel some discomfort at first, but you should not feel pain. Regular exercise will help decrease your discomfort over time. Lower back exercises: Your healthcare provider may recommend that you do back exercises 10 to 30 minutes each day. He or she may also recommend that you do exercises 1 to 3 times each day. Ask your provider which exercises are best for you and how often to do them. Ankle pumps:  Lie on your back. Move your foot up (with your toes pointing toward your head). Then, move your foot down (with your toes pointing away from you). Repeat this exercise 10 times on each side. Heel slides:  Lie on your back. Slowly bend one leg and then straighten it. Next, bend the other leg and then straighten it. Repeat 10 times on each side. Pelvic tilt:  Lie on your back with your knees bent and feet flat on the floor. Place your arms in a relaxed position beside your body. Tighten the muscles of your abdomen and flatten your back against the floor. Hold for 5 seconds. Repeat 5 times. Back stretch:  Lie on your back with your hands behind your head. Bend your knees and turn the lower half of your body to one side.  Hold this position for 10 seconds. Repeat 3 times on each side. Straight leg raises:  Lie on your back with one leg straight. Bend the other knee. Tighten your abdomen and then slowly lift the straight leg up about 6 to 12 inches off the floor. Hold for 1 to 5 seconds. Lower your leg slowly. Repeat 10 times on each leg. Knee-to-chest:  Lie on your back with your knees bent and feet flat on the floor. Pull one of your knees toward your chest and hold it there for 5 seconds. Return your leg to the starting position. Lift the other knee toward your chest and hold for 5 seconds. Do this 5 times on each side. Cat and camel:  Place your hands and knees on the floor. Arch your back upward toward the ceiling and lower your head. Round out your spine as much as you can. Hold for 5 seconds. Lift your head upward and push your chest downward toward the floor. Hold for 5 seconds. Do 3 sets or as directed. Wall squats:  Stand with your back against a wall. Tighten the muscles of your abdomen. Slowly lower your body until your knees are bent at a 45 degree angle. Hold this position for 5 seconds. Slowly move back up to a standing position. Repeat 10 times. Curl up:  Lie on your back with your knees bent and feet flat on the floor. Place your hands, palms down, underneath the curve in your lower back. Next, with your elbows on the floor, lift your shoulders and chest 2 to 3 inches. Keep your head in line with your shoulders. Hold this position for 5 seconds. When you can do this exercise without pain for 10 to 15 seconds, you may add a rotation. While your shoulders and chest are lifted off the ground, turn slightly to the left and hold. Repeat on the other side. Bird dog:  Place your hands and knees on the floor. Keep your wrists directly below your shoulders and your knees directly below your hips. Pull your belly button in toward your spine. Do not flatten or arch your back. Tighten your abdominal muscles. Raise one arm straight out so that it is aligned with your head. Next, raise the leg opposite your arm. Hold this position for 15 seconds. Lower your arm and leg slowly and change sides. Do 5 sets. Follow up with your doctor as directed:  Write down your questions so you remember to ask them during your visits. © Copyright Kevin [employer] 2023 Information is for End User's use only and may not be sold, redistributed or otherwise used for commercial purposes. The above information is an  only. It is not intended as medical advice for individual conditions or treatments. Talk to your doctor, nurse or pharmacist before following any medical regimen to see if it is safe and effective for you.

## 2023-10-16 ENCOUNTER — OFFICE VISIT (OUTPATIENT)
Dept: FAMILY MEDICINE CLINIC | Facility: CLINIC | Age: 38
End: 2023-10-16

## 2023-10-16 VITALS
DIASTOLIC BLOOD PRESSURE: 80 MMHG | HEIGHT: 60 IN | WEIGHT: 219.6 LBS | TEMPERATURE: 98.2 F | BODY MASS INDEX: 43.11 KG/M2 | HEART RATE: 95 BPM | OXYGEN SATURATION: 99 % | SYSTOLIC BLOOD PRESSURE: 110 MMHG

## 2023-10-16 DIAGNOSIS — M54.50 CHRONIC BILATERAL LOW BACK PAIN WITHOUT SCIATICA: ICD-10-CM

## 2023-10-16 DIAGNOSIS — G89.29 CHRONIC BILATERAL LOW BACK PAIN WITHOUT SCIATICA: ICD-10-CM

## 2023-10-16 NOTE — PATIENT INSTRUCTIONS
Lower Back Exercises   AMBULATORY CARE:   Lower back exercises  help heal and strengthen your back muscles to prevent another injury. Ask your healthcare provider if you need to see a physical therapist for more advanced exercises. Seek care immediately if:   You have severe pain that prevents you from moving. Call your doctor if:   Your pain becomes worse. You have new pain. You have questions or concerns about your condition or care. Do lower back exercises safely:   Do the exercises on a mat or firm surface (not on a bed). A firm surface will support your spine and prevent low back pain. Move slowly and smoothly. Avoid fast or jerky motions. Breathe normally. Do not hold your breath. Stop if you feel pain. It is normal to feel some discomfort at first, but you should not feel pain. Regular exercise will help decrease your discomfort over time. Lower back exercises: Your healthcare provider may recommend that you do back exercises 10 to 30 minutes each day. He or she may also recommend that you do exercises 1 to 3 times each day. Ask your provider which exercises are best for you and how often to do them. Ankle pumps:  Lie on your back. Move your foot up (with your toes pointing toward your head). Then, move your foot down (with your toes pointing away from you). Repeat this exercise 10 times on each side. Heel slides:  Lie on your back. Slowly bend one leg and then straighten it. Next, bend the other leg and then straighten it. Repeat 10 times on each side. Pelvic tilt:  Lie on your back with your knees bent and feet flat on the floor. Place your arms in a relaxed position beside your body. Tighten the muscles of your abdomen and flatten your back against the floor. Hold for 5 seconds. Repeat 5 times. Back stretch:  Lie on your back with your hands behind your head. Bend your knees and turn the lower half of your body to one side.  Hold this position for 10 seconds. Repeat 3 times on each side. Straight leg raises:  Lie on your back with one leg straight. Bend the other knee. Tighten your abdomen and then slowly lift the straight leg up about 6 to 12 inches off the floor. Hold for 1 to 5 seconds. Lower your leg slowly. Repeat 10 times on each leg. Knee-to-chest:  Lie on your back with your knees bent and feet flat on the floor. Pull one of your knees toward your chest and hold it there for 5 seconds. Return your leg to the starting position. Lift the other knee toward your chest and hold for 5 seconds. Do this 5 times on each side. Cat and camel:  Place your hands and knees on the floor. Arch your back upward toward the ceiling and lower your head. Round out your spine as much as you can. Hold for 5 seconds. Lift your head upward and push your chest downward toward the floor. Hold for 5 seconds. Do 3 sets or as directed. Wall squats:  Stand with your back against a wall. Tighten the muscles of your abdomen. Slowly lower your body until your knees are bent at a 45 degree angle. Hold this position for 5 seconds. Slowly move back up to a standing position. Repeat 10 times. Curl up:  Lie on your back with your knees bent and feet flat on the floor. Place your hands, palms down, underneath the curve in your lower back. Next, with your elbows on the floor, lift your shoulders and chest 2 to 3 inches. Keep your head in line with your shoulders. Hold this position for 5 seconds. When you can do this exercise without pain for 10 to 15 seconds, you may add a rotation. While your shoulders and chest are lifted off the ground, turn slightly to the left and hold. Repeat on the other side. Bird dog:  Place your hands and knees on the floor. Keep your wrists directly below your shoulders and your knees directly below your hips. Pull your belly button in toward your spine. Do not flatten or arch your back. Tighten your abdominal muscles. Raise one arm straight out so that it is aligned with your head. Next, raise the leg opposite your arm. Hold this position for 15 seconds. Lower your arm and leg slowly and change sides. Do 5 sets. Follow up with your doctor as directed:  Write down your questions so you remember to ask them during your visits. © Copyright [facility] 2023 Information is for End User's use only and may not be sold, redistributed or otherwise used for commercial purposes. The above information is an  only. It is not intended as medical advice for individual conditions or treatments. Talk to your doctor, nurse or pharmacist before following any medical regimen to see if it is safe and effective for you.

## 2023-10-16 NOTE — PROGRESS NOTES
Assessment/Plan:    BMI 40.0-44.9, adult 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 Keep a food log  Add beans daily    Chronic bilateral low back pain without sciatica  Continue Naproxen prn. Discussed taking medication with food  Continue OMT   Finished PT, continue home exercises 2x per day  Discussed the importance of exercises and how it can help reduce back pain  Follow up weight management  Follow up lumbar xrays  Follow up in  2 months for back pain/lifestyle   Hemoglobin A1c ordered for impaired fasting glucose       Diagnoses and all orders for this visit:    BMI 40.0-44.9, adult ([facility])    Chronic bilateral low back pain without sciatica            Physical Activity Assessment and Intervention:    Physical Activity Prescription completed with patient      Other interventions: Recommend 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 Goal is to increase it to at least 5 days per day for 30 minutes. Subjective:      Patient ID: Aruna Troy is a 43 y.o. female. 44 yo female presents for follow up for lower back pain, lifestyle changes. Reports left sided back pain with no numbness or tingling or weakness. Denies using any oral medication. She has not started robaxin yet. No lidocaine patches. Finished physical therapy. Does home exercises at work at least once per day. She reports doing well with goal of at least 2 vegetable servings at dinner. She has intentionally lost 6lbs (225lbs->219lbs). No history of injury. She has not gotten her X-rays since last office visit and has not scheduled with weight management. Reports snacking only 1 time per day, usually an Kailash ice. She eats about two meals per day, usually lunch and dinner. She reports eating lunch between 9-10:30am, which is usually a mixed hoagies bowel that is similar to a salad at [address] [place] [address], which is where she works. Lunch time-free hoagies at lunch but eats in a bowel. She eats dinner at 5pm, usually includes Steak, chicken, pork chop, rice pasta, potatoes. Notes that she usually eats more potatoes in her family. Discussed getting updated HA1c. The following portions of the patient's history were reviewed and updated as appropriate: allergies, current medications, past family history, past medical history, past social history, past surgical history and problem list.    Review of Systems   Musculoskeletal:  Positive for back pain and neck pain. Neurological:  Negative for numbness. Objective:      /80 (BP Location: Right arm, Patient Position: Sitting, Cuff Size: Large)   Pulse 95   Temp 98.2 °F (36.8 °C) (Tympanic)   Ht 5' (1.524 m)   Wt 99.6 kg (219 lb 9.6 oz)   SpO2 99%   BMI 42.89 kg/m²          Physical Exam  Vitals and nursing note reviewed. Constitutional:       Appearance: Normal appearance. Comments: Body mass index is 42.89 kg/m². Cardiovascular:      Rate and Rhythm: Normal rate and regular rhythm. Pulmonary:      Effort: Pulmonary effort is normal. No respiratory distress. Breath sounds: No wheezing, rhonchi or rales. Musculoskeletal:      Lumbar back: Spasms present. No tenderness or bony tenderness. Comments: Extremely hypertonicity in lumbar back paraspinal muscles bilaterally. Tight hamstrings, worse on right leg than left   Neurological:      Mental Status: She is alert.

## 2023-10-16 NOTE — ASSESSMENT & PLAN NOTE
Patient pre-contemplative approaching contemplative when it comes to exercise   Overall, she feels like her eating habits have gotten better  Current goal: Increase 3x per week with body weight training exercises at home during commercials    At next visit consider the following questions/goals:  What is standing in the way of becoming more active for you?   Keep a food log  Add beans daily

## 2023-10-16 NOTE — ASSESSMENT & PLAN NOTE
Continue Naproxen prn.  Discussed taking medication with food  Continue OMT   Finished PT, continue home exercises 2x per day  Discussed the importance of exercises and how it can help reduce back pain  Follow up weight management  Follow up lumbar xrays  Follow up in  2 months for back pain/lifestyle   Hemoglobin A1c ordered for impaired fasting glucose

## 2023-11-03 ENCOUNTER — PROCEDURE VISIT (OUTPATIENT)
Dept: FAMILY MEDICINE CLINIC | Facility: CLINIC | Age: 38
End: 2023-11-03
Payer: MEDICARE

## 2023-11-03 DIAGNOSIS — G89.29 CHRONIC BILATERAL LOW BACK PAIN WITHOUT SCIATICA: ICD-10-CM

## 2023-11-03 DIAGNOSIS — M54.50 CHRONIC BILATERAL LOW BACK PAIN WITHOUT SCIATICA: ICD-10-CM

## 2023-11-03 DIAGNOSIS — G43.909 MIGRAINE WITHOUT STATUS MIGRAINOSUS, NOT INTRACTABLE, UNSPECIFIED MIGRAINE TYPE: Primary | ICD-10-CM

## 2023-11-03 DIAGNOSIS — M99.04 SOMATIC DYSFUNCTION OF SACRAL SPINE: ICD-10-CM

## 2023-11-03 DIAGNOSIS — M26.609 TEMPORAL MANDIBULAR JOINT DISORDER: ICD-10-CM

## 2023-11-03 DIAGNOSIS — M99.01 SOMATIC DYSFUNCTION OF CERVICAL REGION: ICD-10-CM

## 2023-11-03 DIAGNOSIS — M99.03 SOMATIC DYSFUNCTION OF LUMBAR REGION: ICD-10-CM

## 2023-11-03 DIAGNOSIS — M99.00 SOMATIC DYSFUNCTION OF HEAD REGION: ICD-10-CM

## 2023-11-03 PROCEDURE — 99213 OFFICE O/P EST LOW 20 MIN: CPT

## 2023-11-03 NOTE — PROGRESS NOTES
The Assessment/Plan     This is a 43 y.o. female who presents for OMT follow-up for:  1. Migraine without status migrainosus, not intractable, unspecified migraine type  OMT      2. Temporal mandibular joint disorder  OMT      3. Chronic bilateral low back pain without sciatica  OMT      4. Somatic dysfunction of head region  OMT      5. Somatic dysfunction of cervical region  OMT      6. Somatic dysfunction of lumbar region  OMT      7. Somatic dysfunction of sacral spine  OMT           1. Patient tolerated OMT well for the above problems,  advised patient to drink fluids and can use NSAID for soreness after treatment     2. OMT Follow up in 2 weeks. 3. Neurology referral for migraines. Migraines may be triggered from TMJ. Patient has difficulty paying for sumatriptan and therefore does not take it. Previously on Topamax without relief. Interested in Neurology for better medication control. Subjective     Lucina Herndon Mt is a 43 y.o. female and is here for a OMT follow up. Reports currently having a right sided migraine with ear and eye pain. Reports left neck pain. Assoicate with photophobia and phonophobia. Not associated with N/V. Also reports she currently has her menstrual cycle. Denies any cramping but reports worsening back pain due to periods and bloating. The patient reports b/l lumbar back pain with left side sciatica. Denies any new trauma. She is not taking any medications. She finished physical therapy. She has been doing home exercises    Is the patient taking Pain medication? no  Has the patient completed physical therapy for this condition? yes-Finished  Did Patient symptoms improve from last OMT appointment?  yes    The following portions of the patient's history were reviewed and updated as appropriate: allergies, current medications, past family history, past medical history, past social history, past surgical history and problem list.    Review of Systems  Review of Systems Eyes:  Positive for photophobia. Negative for visual disturbance. Gastrointestinal:  Negative for nausea and vomiting. Musculoskeletal:  Positive for arthralgias, back pain and gait problem. Negative for myalgias and neck pain. Neurological:  Positive for headaches. Negative for dizziness, light-headedness and numbness. Objective     OMT Exam     OMT    Performed by: Rosita Cordoba DO  Authorized by: Rosita Cordoba DO  Universal Protocol:  Consent: Verbal consent obtained. Risks and benefits: risks, benefits and alternatives were discussed  Patient identity confirmed: verbally with patient      Procedure Details:     Region evaluated and treated:  Head, Cervical, Lumbar and Sacrum/Pelvis    Head Details:     Examination Method:  Tissue Texture Change, Stability, Laxity, Effusions, Tone, Asymmetry, Misalignment, Crepitation, Defects, Masses, Tenderness, Pain and Range of Motion, Contracture    Severity:  Moderate    Osteopathic Findings:  Right TMJ-jar deviates towards left on opening  Right paraspinal hypertrophy     Treatment Method:  Indirect Treatment, Muscle Energy Treatment, Myofascial Release Treatment, Soft Tissue Treatment, Direct Treatment and Counterstrain Treatment    Response:  Improved - The somatic dysfunction is improved but not completely resolved. Cervical Details:     Examination Method:  Tissue Texture Change, Stability, Laxity, Effusions, Tone, Tenderness, Pain and Asymmetry, Misalignment, Crepitation, Defects, Masses    Severity:  Moderate    Osteopathic Findings:  Left paraspinal hypertrophy and tenderness  RLSL  C5 left tenderpoint    Treatment Method:  Counterstrain Treatment, Direct Treatment, Indirect Treatment, Muscle Energy Treatment, Myofascial Release Treatment and Soft Tissue Treatment    Response:  Improved - The somatic dysfunction is improved but not completely resolved.     Lumbar details:     Examination Method:  Tissue Texture Change, Stability, Laxity, Effusions, Tone, Range of Motion, Contracture, Asymmetry, Misalignment, Crepitation, Defects, Masses and Tenderness, Pain    Osteopathic Findings:  Hypertrophy bilateral paraspinal muscles  [id number]    Treatment Method:  Direct Treatment, Muscle Energy Treatment, Soft Tissue Treatment and Indirect Treatment    Response:  Improved - The somatic dysfunction is improved but not completely resolved. Sacrum/Pelvis details:     Examination Method:  Tissue Texture Change, Stability, Laxity, Effusions, Tone, Asymmetry, Misalignment, Crepitation, Defects, Masses, Tenderness, Pain and Range of Motion, Contracture    Osteopathic Findings:  Right unilateral sacral flexion  Left posterior innominate  [place]ington left leg  Left psoas tenderpoint    Treatment Method:  Direct Treatment, Counterstrain Treatment, Muscle Energy Treatment, Soft Tissue Treatment and Indirect Treatment    Response:  Improved - The somatic dysfunction is improved but not completely resolved. Total Regions Treated:  4  Attending provider present in exam room for procedure:  No

## 2023-11-22 ENCOUNTER — TELEPHONE (OUTPATIENT)
Dept: NEUROLOGY | Facility: CLINIC | Age: 38
End: 2023-11-22

## 2023-11-22 NOTE — TELEPHONE ENCOUNTER
Called patient left voicemail,to confirm appointment with Dr. Per Farmer on 11/27/2023 at [facility].

## 2023-11-29 DIAGNOSIS — G43.909 MIGRAINE WITHOUT STATUS MIGRAINOSUS, NOT INTRACTABLE, UNSPECIFIED MIGRAINE TYPE: ICD-10-CM

## 2023-12-04 ENCOUNTER — OFFICE VISIT (OUTPATIENT)
Dept: FAMILY MEDICINE CLINIC | Facility: CLINIC | Age: 38
End: 2023-12-04

## 2023-12-04 VITALS
SYSTOLIC BLOOD PRESSURE: 110 MMHG | HEART RATE: 85 BPM | OXYGEN SATURATION: 98 % | DIASTOLIC BLOOD PRESSURE: 70 MMHG | WEIGHT: 222.6 LBS | BODY MASS INDEX: 43.7 KG/M2 | HEIGHT: 60 IN | TEMPERATURE: 98 F

## 2023-12-04 DIAGNOSIS — G89.29 CHRONIC BILATERAL LOW BACK PAIN WITHOUT SCIATICA: ICD-10-CM

## 2023-12-04 DIAGNOSIS — M54.50 CHRONIC BILATERAL LOW BACK PAIN WITHOUT SCIATICA: ICD-10-CM

## 2023-12-04 RX ORDER — METHOCARBAMOL 500 MG/1
500 TABLET, FILM COATED ORAL 4 TIMES DAILY
Qty: 28 TABLET | Refills: 0 | Status: SHIPPED | OUTPATIENT
Start: 2023-12-04 | End: 2023-12-11

## 2023-12-04 RX ORDER — SUMATRIPTAN 25 MG/1
25 TABLET, FILM COATED ORAL ONCE AS NEEDED
Qty: 9 TABLET | Refills: 9 | Status: SHIPPED | OUTPATIENT
Start: 2023-12-04

## 2023-12-04 NOTE — PROGRESS NOTES
Assessment/Plan:    Chronic bilateral low back pain without sciatica  Discussed using scheduled 800mg ibuprofen q6h with food 2 days before known cramping is to start based on period tracker  Continue OMT   Refilled Robaxin  Finished PT, continue home exercises 2x per day  Discussed the importance of exercises and sleep and how it can help reduce back pain  Follow up weight management  Follow up lumbar xrays  Follow up in  3 months for back pain/lifestyle        Diagnoses and all orders for this visit:    Chronic bilateral low back pain without sciatica  -     methocarbamol (ROBAXIN) 500 mg tablet; Take 1 tablet (500 mg total) by mouth 4 (four) times a day for 7 days            Emotional and Mental Well-being, Sleep, Connectedness Assessment and Intervention:    Counseled regarding sleep hygiene and aspects of healthy sleep      Other interventions: Discussed using negar for guided sleep mediations     Subjective:      Patient ID: Radha Wu is a 43 y.o. female. 44 yo female presents for follow up for lower back pain, lifestyle changes. She reports having a left sided migraine located in her ear. She has taken Sumatriptan  today, but it hasn't helped. Denies taking other medications. Reports Tylenol, ibuprofen and Excedrin doesn't help. She has not established with Neurology due to her work schedule conflicting. In terms of her back, she reports her back pain is intermittent. Denies any associated numbness or tingling. Reports back pain is worse 1 week prior to period. Denies getting abdominal cramps associated with period. Overall her back pain has improved. She occasionally uses lidocaine patches for her back pain. She has not received the Robaxin. She still does her home exercises. She formally graduated from PT. She mainly eats salads for lunch and dinner. She reports barely snacking but does occasionally have chips as a snack, but most of the time its carrots, celery, grapes.  She does not have time for formal exercise due to her busy schedule. She does have difficulty staying asleep. She has no difficulty initially falling asleep. Has a set sleep and wake cycle. Avoids electronics at night. Does not use any sleep aids. Denies any alcohol use, coffee, or caffeine use. She has not yet tried mediatation or sleep noises. She has not gotten her X-rays since last office visit and has not scheduled with weight management. The following portions of the patient's history were reviewed and updated as appropriate: allergies, current medications, past family history, past medical history, past social history, past surgical history and problem list.    Review of Systems   Musculoskeletal:  Positive for back pain and neck pain. Neurological:  Negative for numbness. Objective:      /70 (BP Location: Right arm, Patient Position: Sitting, Cuff Size: Large)   Pulse 85   Temp 98 °F (36.7 °C) (Tympanic)   Ht 5' (1.524 m)   Wt 101 kg (222 lb 9.6 oz)   SpO2 98%   BMI 43.47 kg/m²          Physical Exam  Vitals and nursing note reviewed. Constitutional:       Appearance: Normal appearance. Comments: Body mass index is 42.89 kg/m². Cardiovascular:      Rate and Rhythm: Normal rate and regular rhythm. Pulmonary:      Effort: Pulmonary effort is normal. No respiratory distress. Breath sounds: No wheezing, rhonchi or rales. Musculoskeletal:      Lumbar back: Spasms present. No tenderness or bony tenderness. Comments: Extremely hypertonicity in lumbar back paraspinal muscles bilaterally. Tight hamstrings, worse on right leg than left   Neurological:      Mental Status: She is alert.

## 2023-12-05 NOTE — ASSESSMENT & PLAN NOTE
Discussed using scheduled 800mg ibuprofen q6h with food 2 days before known cramping is to start based on period tracker  Continue OMT   Refilled Robaxin  Finished PT, continue home exercises 2x per day  Discussed the importance of exercises and sleep and how it can help reduce back pain  Follow up weight management  Follow up lumbar xrays  Follow up in  3 months for back pain/lifestyle

## 2023-12-06 ENCOUNTER — PROCEDURE VISIT (OUTPATIENT)
Dept: FAMILY MEDICINE CLINIC | Facility: CLINIC | Age: 38
End: 2023-12-06

## 2023-12-06 DIAGNOSIS — M99.01 SOMATIC DYSFUNCTION OF CERVICAL REGION: ICD-10-CM

## 2023-12-06 DIAGNOSIS — M26.609 TEMPORAL MANDIBULAR JOINT DISORDER: ICD-10-CM

## 2023-12-06 DIAGNOSIS — M99.04 SOMATIC DYSFUNCTION OF SACRAL SPINE: ICD-10-CM

## 2023-12-06 DIAGNOSIS — M99.00 SOMATIC DYSFUNCTION OF HEAD REGION: ICD-10-CM

## 2023-12-06 DIAGNOSIS — M99.03 SOMATIC DYSFUNCTION OF LUMBAR REGION: ICD-10-CM

## 2023-12-06 DIAGNOSIS — G89.29 CHRONIC BILATERAL LOW BACK PAIN WITHOUT SCIATICA: Primary | ICD-10-CM

## 2023-12-06 DIAGNOSIS — M54.50 CHRONIC BILATERAL LOW BACK PAIN WITHOUT SCIATICA: Primary | ICD-10-CM

## 2023-12-06 DIAGNOSIS — G43.909 MIGRAINE WITHOUT STATUS MIGRAINOSUS, NOT INTRACTABLE, UNSPECIFIED MIGRAINE TYPE: ICD-10-CM

## 2023-12-06 NOTE — PROGRESS NOTES
Assessment/Plan     This is a 43 y.o. female who presents for OMT visit for:  Migraine Headaches  Somatic dysfunction of Head Region  Somatic dysfunction of cervical region  Somatic dysfunction of  region    Plan:   1. Patient tolerated OMT well for the above problems,  advised patient to drink fluids and can use NSAID for soreness after treatment     2. The patient is enrolled in the OMT Migraine Study, this is study visit # 1. Consent has been signed. Next OMT Follow up in in 2 weeks. 3. initial MIDAS Score 24, HIT 6 Score 70, Headache Days in last 3 months 18, pain score: 7    Lucina Ayers is a 43 y.o. female and is here for a OMT follow up for Migraine headaches. The patient reports migraines once a month, which last 4-5 days. Her last one was last week. Her low back pain is improved. Review of Systems  Neuro: positive migraines. MSK: as noted in HPI. Objective     OMT Exam   OMT Exam  Head:   Somatic Dysfunction:  Tissue Texture Changes  Severity :  2  Osteopathic Findings: Decreased CRI. Fascial restriction of forehead. Treatment Method: MFR and cranial CV4 and OA release   Response: improved  Cervical:   Somatic Dysfunction:  Tissue Texture Changes, Restriction, and Tenderness  Severity :  2  Osteopathic Findings: Tender points were identified and treated as needed with CS. Paraspinal muscle hypertonicity.  Restricted ROM of cervical spine  Treatment Method: ME, ST, and CS  Response: improved  Thoracic T1-4:   Somatic Dysfunction:  Tissue Texture Changes  Severity :  2  Osteopathic Findings: Hypertonicity of mid trapezius muscle   Treatment Method: ME and ST  Response: improved

## 2023-12-27 ENCOUNTER — PROCEDURE VISIT (OUTPATIENT)
Dept: FAMILY MEDICINE CLINIC | Facility: CLINIC | Age: 38
End: 2023-12-27
Payer: MEDICARE

## 2023-12-27 DIAGNOSIS — M99.07 SOMATIC DYSFUNCTION OF RIGHT UPPER EXTREMITY: ICD-10-CM

## 2023-12-27 DIAGNOSIS — G43.811 OTHER MIGRAINE WITH STATUS MIGRAINOSUS, INTRACTABLE: Primary | ICD-10-CM

## 2023-12-27 DIAGNOSIS — M99.07 SOMATIC DYSFUNCTION OF LEFT UPPER EXTREMITY: ICD-10-CM

## 2023-12-27 DIAGNOSIS — M99.00 SOMATIC DYSFUNCTION OF HEAD REGION: ICD-10-CM

## 2023-12-27 DIAGNOSIS — M99.01 SOMATIC DYSFUNCTION OF CERVICAL REGION: ICD-10-CM

## 2023-12-27 PROCEDURE — 98926 OSTEOPATH MANJ 3-4 REGIONS: CPT

## 2023-12-27 NOTE — PROGRESS NOTES
The Assessment/Plan     This is a 38 y.o. female who presents for OMT follow-up for:  1. Other migraine with status migrainosus, intractable  OMT      2. Somatic dysfunction of head region  OMT      3. Somatic dysfunction of cervical region  OMT      4. Somatic dysfunction of left upper extremity  OMT      5. Somatic dysfunction of right upper extremity  OMT           1. Patient tolerated OMT well for the above problems,  advised patient to drink fluids and can use NSAID for soreness after treatment     2. OMT Follow up in 4 weeks.    Subjective     Lucina Barrett is a 38 y.o. female and is here for an OMT follow up. The patient reports she has had ongoing migraines for 8 years. She is on sumatriptan, which she takes as needed. She reports that she hasn't had a migraine in a month. She states that her migraines have improved since last OMT visit. She states she often will need to stretch out her sumatriptan medication as her insurance only allows her to get 9 pills per month. She has no associated aura, visual disturbances, or light sensitivity with the migraines.     Is the patient taking Pain medication?  Not on a consistent basis  Has the patient completed physical therapy for this condition? no  Did Patient symptoms improve from last OMT appointment? yes    The following portions of the patient's history were reviewed and updated as appropriate: allergies, current medications, past family history, past medical history, past social history, past surgical history, and problem list.    Review of Systems  Review of Systems   Constitutional:  Negative for chills.   HENT:  Negative for ear pain and trouble swallowing.    Eyes:  Negative for photophobia and visual disturbance.   Respiratory:  Negative for chest tightness and shortness of breath.    Cardiovascular:  Negative for chest pain.   Gastrointestinal:  Negative for nausea and vomiting.   Skin:  Negative for pallor and wound.   Neurological:  Positive  for headaches. Negative for dizziness.   Psychiatric/Behavioral:  Negative for confusion and decreased concentration.          Objective     OMT Exam     OMT    Performed by: Alma Rosa Vinson DO  Authorized by: Alma Rosa Vinson DO  Universal Protocol:  Procedure performed by: (Dr. White and Dr. Dougherty)  Consent: Verbal consent obtained.  Risks and benefits: risks, benefits and alternatives were discussed  Consent given by: patient  Patient understanding: patient states understanding of the procedure being performed  Patient consent: the patient's understanding of the procedure matches consent given  Patient identity confirmed: verbally with patient      Procedure Details:     Region evaluated and treated:  Head, Cervical, Left Extremities and Right Extremities    Extremity Information  Extremities: left upper extremity    Extremity Information  Extremities: right upper extremity    Head Details:     Examination Method:  Tissue Texture Change, Stability, Laxity, Effusions, Tone and Asymmetry, Misalignment, Crepitation, Defects, Masses    Severity:  Moderate    Osteopathic Findings:  Frontal muscle hypertonicity, hypertonic OA    Treatment Method:  Soft Tissue Treatment and Muscle Energy Treatment    Response:  Improved - The somatic dysfunction is improved but not completely resolved.    Cervical Details:     Osteopathic Findings:  C3-6 rotated right, sidebent left, hypertonic cervical paraspinal muscles, no cervical tender points    Response:  Improved - The somatic dysfunction is improved but not completely resolved.    Right Upper Extremity details:     Examination Method:  Tissue Texture Change, Stability, Laxity, Effusions, Tone and Asymmetry, Misalignment, Crepitation, Defects, Masses    Osteopathic Findings:  Hypertonicity in right upper trapezius    Treatment Method:  Muscle Energy Treatment and Soft Tissue Treatment    Left Upper Extremity details:     Examination Method:  Tissue Texture Change,  Stability, Laxity, Effusions, Tone and Asymmetry, Misalignment, Crepitation, Defects, Masses    Severity:  Moderate    Osteopathic Findings:  Hypertonicity in left upper trapezius    Treatment Method:  Muscle Energy Treatment and Soft Tissue Treatment    Response:  Improved - The somatic dysfunction is improved but not completely resolved.    Total Regions Treated:  4  Attending provider present in exam room for procedure: Yes

## 2024-01-31 ENCOUNTER — PROCEDURE VISIT (OUTPATIENT)
Dept: FAMILY MEDICINE CLINIC | Facility: CLINIC | Age: 39
End: 2024-01-31

## 2024-01-31 DIAGNOSIS — G43.811 OTHER MIGRAINE WITH STATUS MIGRAINOSUS, INTRACTABLE: Primary | ICD-10-CM

## 2024-01-31 DIAGNOSIS — M99.01 SOMATIC DYSFUNCTION OF CERVICAL REGION: ICD-10-CM

## 2024-01-31 DIAGNOSIS — M99.00 SOMATIC DYSFUNCTION OF HEAD REGION: ICD-10-CM

## 2024-01-31 NOTE — PROGRESS NOTES
The Assessment/Plan     This is a 38 y.o. female who presents for OMT follow-up for:  1. Other migraine with status migrainosus, intractable        2. Somatic dysfunction of head region        3. Somatic dysfunction of cervical region             1. Patient tolerated OMT well for the above problems,  advised patient to drink fluids and can use NSAID for soreness after treatment     2. OMT Follow up in 4 weeks.    OMT study visit #3, next visit in 4 weeks.    Subjective     Lucina Barrett is a 38 y.o. female and is here for a OMT follow up. The patient reports migraines over the last 8 years. She endorses that the duration of her migraines is improving during this interval. She has had a total of 2 migraines that were stopped with sumatriptan. The headaches are primarily on her right side. She denies auras, photosensitivity, and phonophobia.       Is the patient taking Pain medication? no, sumatriptan  Has the patient completed physical therapy for this condition? no  Did Patient symptoms improve from last OMT appointment? yes    The following portions of the patient's history were reviewed and updated as appropriate: allergies, current medications, past family history, past medical history, past social history, past surgical history, and problem list.    Review of Systems  Review of Systems   Constitutional:  Negative for activity change, appetite change, chills and fever.   Respiratory:  Negative for shortness of breath.    Cardiovascular:  Negative for chest pain.   Gastrointestinal:  Negative for abdominal pain, nausea and vomiting.   Musculoskeletal:  Positive for back pain (thoracic) and neck pain.   Skin:  Negative for rash.   Neurological:  Positive for headaches. Negative for dizziness, syncope and weakness.         Objective     OMT Exam     OMT    Performed by: Amelia Robles DO  Authorized by: Amelia Robles DO  Universal Protocol:  Procedure performed by: Carolann Ballard  MS4)  Consent: Verbal consent obtained.  Patient understanding: patient states understanding of the procedure being performed      Procedure Details:     Region evaluated and treated:  Cervical and Head    Head Details:     Examination Method:  Tissue Texture Change, Stability, Laxity, Effusions, Tone    Treatment Method:  Myofascial Release Treatment    Response:  Improved - The somatic dysfunction is improved but not completely resolved.    Cervical Details:     Examination Method:  Tissue Texture Change, Stability, Laxity, Effusions, Tone, Tenderness, Pain and Asymmetry, Misalignment, Crepitation, Defects, Masses    Osteopathic Findings:  Hypertonicity of right paraspinal muscle.   Tenderness  of right cervical region    Treatment Method:  Muscle Energy Treatment, Myofascial Release Treatment and Soft Tissue Treatment    Response:  Improved - The somatic dysfunction is improved but not completely resolved.    Total Regions Treated:  2

## 2024-02-15 ENCOUNTER — TELEPHONE (OUTPATIENT)
Dept: NEUROLOGY | Facility: CLINIC | Age: 39
End: 2024-02-15

## 2024-02-19 ENCOUNTER — CONSULT (OUTPATIENT)
Dept: NEUROLOGY | Facility: CLINIC | Age: 39
End: 2024-02-19
Payer: MEDICARE

## 2024-02-19 VITALS
WEIGHT: 219.8 LBS | HEIGHT: 60 IN | DIASTOLIC BLOOD PRESSURE: 56 MMHG | BODY MASS INDEX: 43.15 KG/M2 | HEART RATE: 70 BPM | OXYGEN SATURATION: 97 % | TEMPERATURE: 97.8 F | SYSTOLIC BLOOD PRESSURE: 128 MMHG

## 2024-02-19 DIAGNOSIS — R53.83 FATIGUE: ICD-10-CM

## 2024-02-19 DIAGNOSIS — G43.909 MIGRAINE WITHOUT STATUS MIGRAINOSUS, NOT INTRACTABLE, UNSPECIFIED MIGRAINE TYPE: Primary | ICD-10-CM

## 2024-02-19 PROCEDURE — 99245 OFF/OP CONSLTJ NEW/EST HI 55: CPT | Performed by: PSYCHIATRY & NEUROLOGY

## 2024-02-19 RX ORDER — PROPRANOLOL HYDROCHLORIDE 40 MG/1
TABLET ORAL
Qty: 90 TABLET | Refills: 3 | Status: SHIPPED | OUTPATIENT
Start: 2024-02-19

## 2024-02-19 RX ORDER — SUMATRIPTAN 50 MG/1
50 TABLET, FILM COATED ORAL ONCE AS NEEDED
Qty: 8 TABLET | Refills: 3 | Status: SHIPPED | OUTPATIENT
Start: 2024-02-19

## 2024-02-19 NOTE — PROGRESS NOTES
NEUROLOGY OUTPATIENT - NEW PATIENT VISIT NOTE        NAME: Lucina Barrett  MRN: [id number]  : 1985     TODAY'S DATE: 24         HISTORY OF PRESENT ILLNESS (HPI):    Ms. Barrett is a 38 y.o. female presenting with Migraine      HEADACHE DESCRIPTION:    Onset of headaches: gradually about 7-8 years back  Location of headaches: right-sided unilateral, occipital, temporal, parietal, and retro-orbital  Radiation: Yes   Quality of the pain: sharp and shooting  Intensity of the headache: At present: 0/10;  At its worst:  10/10  Duration of the headaches:day(s) (2-9 days)  Frequency of the headaches:about 2-9 days per month  Presence of aura:  Yes, some tension behind the eye  Associated symptoms with headaches: no nausea , no vomiting , +light sensitivity , and +sound sensitivity  Triggers:No   Positional component: Yes, some throbbing with bending down   Alleviating factors: Yes, Imitrex sometimes helps.    Any specific time of the day when get the headaches: no particular time of day    Family history of migraine headaches: No  History of neck and head trauma: No  Smoking status: No  Oral contraceptive use: No    Life style factors:  -Hydration: adequate  -Sleep: adequate  -Caffeine intake: none  -Alcohol intake: none     Impact of headches:  -Number of headaches in past 30 days: 1030 days.   -Number of days missed per month because of headache: none in the last 30 days.   -Number of ER visits for her headaches: none  in the last 30 days.       Treatment:  -Over the counter medications tried for headaches:   Naproxen been helpful     -Prescription medications:  Abortive or Rescue Medications used:   Sumatriptan been helpful     Preventative or daily medications used for headaches:   Topamax (did not helped).       Other significant co morbidities:  Fatigue (she is tired even when waking up, doze off fast). She typically works around 8 hours.       Red Flags for headache:  Age <5 or >50: No  First  worst headaches: No  Maximal at intensity: No  Change in pattern: Yes  New headache in pregnancy, cancer or immunocompromised patient: No  Loss of consciousness or convulsions: No  Headache triggered by exertion, Valsalva maneuver or sexual activity: No  Abnormal exam: please see below in Neurological examination.        CURRENT OUTPATIENT MEDICATIONS:    Lucina Barrett has a current medication list which includes the following prescription(s): lidocaine, sumatriptan, and methocarbamol.       PHYSICAL EXAMINATION:   VITAL SIGNS:  height is 5' (1.524 m) and weight is 99.7 kg (219 lb 12.8 oz). Her temporal temperature is 97.8 °F (36.6 °C). Her blood pressure is 128/56 and her pulse is 70. Her oxygen saturation is 97%.        NEUROLOGICAL EXAMINATION:    MENTAL STATUS EXAM: Alert, oriented to time place and person     CRANIAL NERVE EXAMINATION:  I Not tested   II Normal visual fields to finger counting.   II, Ill,  IV, VI Pupils were symmetric, briskly reactive. No afferent pupillary defect.  Eye movements are full without nystagmus. No ptosis.   V Facial sensation were intact   VII Facial movements were symmetrical    VIII Hearing was intact   IX, X Intact   XI Shoulder shrug and head turn was normal   XII Tongue protrusion is in the mid line.          MOTOR EXAM:        Arm Right  Left Leg Right  Left   Deltoid Unable to assess secondary to elbow pain  5/5 lliopsoas 5/5 5/5   Biceps Unable to assess secondary to elbow pain  5/5 Quads 5/5 5/5   Triceps Unable to assess secondary to elbow pain  5/5 Hamstrings 5/5 5/5   Wrist Extension 5/5 5/5 Ankle Dorsi Flexion 5/5 5/5   Wrist Flexion 5/5 5/5 Ankle Plantar Flexion 5/5 5/5               DEEP TENDON REFLEXES:     Brachioradialis Biceps Triceps Patellar Achilles   Right 2+ 2+ 2+ 2+ 1+   Left 2+ 2+ 2+ 2+ 1+            SENSORY EXAMINATION:   Sensation to dull touch Intact  Sensation to temperature Intact    COORDINATION:   Normal finger to nose testing  Finger  "tapping test was normal    GAIT/STATION:   normal        DIAGNOSTIC STUDIES:   PERTINENT LABS:     Lab Results   Component Value Date    WBC 8.52 11/15/2022     Lab Results   Component Value Date    HGB 10.6 (L) 11/15/2022     Lab Results   Component Value Date     11/15/2022     Lab Results   Component Value Date    LYMPHSABS 1.64 11/15/2022     No results found for: \"LABLYMP\"  Lab Results   Component Value Date    EOSABS 0.06 11/15/2022     No components found for: \"NEUTROPHILS\"    No results found for: \"LABMONO\"         No results found for: \"LABGLUC\"  Lab Results   Component Value Date    CREATININE 0.55 (L) 07/22/2023     Lab Results   Component Value Date    AST 14 11/15/2022     Lab Results   Component Value Date    ALT 9 11/15/2022     Lab Results   Component Value Date    ALKPHOS 114 (H) 11/15/2022     Lab Results   Component Value Date    AST 14 11/15/2022          No components found for: \"FLOWCYTEVAL\"     Lab Results   Component Value Date    HEPBSAG Non-reactive 05/18/2022    HEPCAB Non-reactive 07/22/2023            NEUROIMAGING:     No new neuroimaging done in the system.      ASSESSMENT AND PLAN:    Ms. Barrett is a 38 y.o.female  presenting with establishing care regarding her migraine headaches that initially started about 7-8 years back but most recently have been not improving.  She was started on Imitrex by her primary care physician at 25 mg on as-needed basis for her headaches which is variable in success.  Neurological examination is nonfocal and there are no red flags which would warrant any further imaging     Migraine without status migrainosus, not intractable, unspecified migraine type  Likely migraine headaches.  As the headaches have been happening for more than one third of the month I would recommend a preventative daily medication.  She has already failed Topamax in the past and we discussed about the propranolol.    Prophylactic medication: You have to take it daily to help " prevent the headaches.  Start with 40 mg of propranolol daily and in about a week's time increase the dose to 80 mg daily. Side effects were discussed in detail.     Rescue medications.  Increase the dose of Imitrex to 50 mg daily on as needed basis as a rescue medicine for your migraine headaches. Please take the medicine on the first sign of your migraine headaches.  You can repeat the dose after 2 hours but no more than 3 tabs in 24 hours.Side effects were discussed in detail.       Healthy lifestyle modifications were also discussed with the patient and written instructions were given.     Magnesium 400 mg daily along with riboflavin 400 mg can also help with headache control         Return in about 3 months (around 5/19/2024) for Roly .       The management plan was discussed in detail with the patient and all questions were answered.     Ms. Barrett was encouraged to contact our office with any questions or concerns and to contact the clinic or go to the nearest emergency room if symptoms change or worsen.       I have spent a total of 63 min in reviewing and/or ordering tests, medications, or procedures, performing an examination or evaluation, reviewing pertinent history, counseling and educating the patient, referring and/or communicating with other health care professionals, documenting in the EMR and general coordination of care of Ms. Barrett  today.           Rogelio Hayes MD.   Staff Neurologist,   Neuroimmunology and Neuroinfectious disease  02/19/24     This report has been created through the use of voice recognition/text compilation software.  Typographical and content errors may occur with this process.  While efforts are made to detect and correct such errors, in some cases errors will persist.  For this reason, wording in this document should be considered in the proper context and not strictly verbatim.  If, when reviewing the document, an error is discovered, please let the office  know at [phone]

## 2024-02-27 ENCOUNTER — TELEPHONE (OUTPATIENT)
Dept: SLEEP CENTER | Facility: CLINIC | Age: 39
End: 2024-02-27

## 2024-02-27 DIAGNOSIS — G43.909 MIGRAINE WITHOUT STATUS MIGRAINOSUS, NOT INTRACTABLE, UNSPECIFIED MIGRAINE TYPE: Primary | ICD-10-CM

## 2024-02-27 DIAGNOSIS — R53.83 FATIGUE, UNSPECIFIED TYPE: ICD-10-CM

## 2024-02-27 NOTE — TELEPHONE ENCOUNTER
Referral placed for a home sleep study. Patients insurance does not cover Home Sleep Study.    Order changed for a In Lab Diagnostic Sleep Study.       Ordering and co-signing providers notified.

## 2024-02-28 ENCOUNTER — PROCEDURE VISIT (OUTPATIENT)
Dept: FAMILY MEDICINE CLINIC | Facility: CLINIC | Age: 39
End: 2024-02-28
Payer: MEDICARE

## 2024-02-28 DIAGNOSIS — M99.00 SOMATIC DYSFUNCTION OF HEAD REGION: ICD-10-CM

## 2024-02-28 DIAGNOSIS — G43.811 OTHER MIGRAINE WITH STATUS MIGRAINOSUS, INTRACTABLE: Primary | ICD-10-CM

## 2024-02-28 DIAGNOSIS — M99.01 SOMATIC DYSFUNCTION OF CERVICAL REGION: ICD-10-CM

## 2024-02-28 PROCEDURE — 98925 OSTEOPATH MANJ 1-2 REGIONS: CPT

## 2024-02-28 NOTE — PROGRESS NOTES
The Assessment/Plan     This is a 38 y.o. female who presents for OMT follow-up for:  No diagnosis found.     1. Patient tolerated OMT well for the above problems,  advised patient to drink fluids and can use NSAID for soreness after treatment     2. OMT Follow up in 4 weeks.   OMT study visit #4  Subjective     Lucina Barrett is a 38 y.o. female and is here for a OMT follow up. The patient reports chronic migraines over the last 8 years. She has seen her neurologist this interval and medication changes including increasing sumatriptan to 50 mg from 20 mg and starting propranolol were made. Patient has not started propranolol. She reports overall improvement of her symptoms, but had an migraine last week that lasted multiple days.     Is the patient taking Pain medication? no, sumatriptan  Has the patient completed physical therapy for this condition? no  Did Patient symptoms improve from last OMT appointment? yes    The following portions of the patient's history were reviewed and updated as appropriate: allergies, current medications, past family history, past medical history, past social history, past surgical history, and problem list.    Review of Systems  Review of Systems   Neurological:  Positive for headaches. Negative for syncope, speech difficulty and weakness.   Photophobia and phonophobia associated with headaches.     Objective     OMT Exam     OMT    Performed by: Amelia Robles DO  Authorized by: Amelia Robles DO      Procedure Details:     Region evaluated and treated:  Cervical and Head    Head Details:     Examination Method:  Asymmetry, Misalignment, Crepitation, Defects, Masses, Tenderness, Pain and Tissue Texture Change, Stability, Laxity, Effusions, Tone    Treatment Method:  Myofascial Release Treatment and Indirect Treatment    Response:  Improved - The somatic dysfunction is improved but not completely resolved.    Cervical Details:     Examination Method:  Tenderness,  Pain, Asymmetry, Misalignment, Crepitation, Defects, Masses, Tissue Texture Change, Stability, Laxity, Effusions, Tone and Range of Motion, Contracture    Severity:  Moderate    Treatment Method:  Indirect Treatment, Direct Treatment, Muscle Energy Treatment, Myofascial Release Treatment and Soft Tissue Treatment    Total Regions Treated:  2

## 2024-03-07 ENCOUNTER — OFFICE VISIT (OUTPATIENT)
Dept: FAMILY MEDICINE CLINIC | Facility: CLINIC | Age: 39
End: 2024-03-07
Payer: MEDICARE

## 2024-03-07 VITALS
BODY MASS INDEX: 43.59 KG/M2 | HEIGHT: 60 IN | DIASTOLIC BLOOD PRESSURE: 70 MMHG | RESPIRATION RATE: 18 BRPM | WEIGHT: 222 LBS | SYSTOLIC BLOOD PRESSURE: 128 MMHG | OXYGEN SATURATION: 98 % | TEMPERATURE: 98.2 F | HEART RATE: 93 BPM

## 2024-03-07 DIAGNOSIS — M54.6 ACUTE BILATERAL THORACIC BACK PAIN: ICD-10-CM

## 2024-03-07 DIAGNOSIS — M54.50 CHRONIC BILATERAL LOW BACK PAIN WITHOUT SCIATICA: Primary | ICD-10-CM

## 2024-03-07 DIAGNOSIS — M77.11 LATERAL EPICONDYLITIS OF RIGHT ELBOW: ICD-10-CM

## 2024-03-07 DIAGNOSIS — G89.29 CHRONIC BILATERAL LOW BACK PAIN WITHOUT SCIATICA: Primary | ICD-10-CM

## 2024-03-07 PROCEDURE — 99213 OFFICE O/P EST LOW 20 MIN: CPT

## 2024-03-07 NOTE — ASSESSMENT & PLAN NOTE
New right elbow pain, most likely due to overuse at work and helping with her kids.   Denies any hand  weakness. Sensation intact. Weakness with wrist extension.       Plan:  OMT  lateral epicondylitis in 1 month  Try Voltaren gel QID as needed for lateral epicondylitis   Restart PT for thoracic back pain and lateral epicondylitis   Follow up in  1-3 months for physical

## 2024-03-07 NOTE — ASSESSMENT & PLAN NOTE
New bilateral thoracic back pain with occasional numbness and tingling down arms with sleep, likely positional. Denies any hand  weakness. Denies any trauma but started to have right elbow pain. Patient is likely overcompensating for her new right lateral epicondylitis. Multifactorial with elevated BMI, patient working with lifestyle changes for weight loss and overcompensation from other MSK spasms    Plan:  Continue OMT for lumbar and thoracic back pain and right lateral epicondylitis in 1 month  Encourage trying Robaxin  Try Voltaren gel QID as needed for lumbar, thoracic, and lateral epicondylitis   Restart PT for thoracic back pain and lateral epicondylitis   Consider rediscuss about going to weight management   Follow up in  1-3 months for physical

## 2024-03-07 NOTE — ASSESSMENT & PLAN NOTE
Mid thoracic back and lumbar back pain  Has not tried Robaxin yet, does not like to take pills regularly for pain like ibuprofen or tylenol for pain     Plan:  Continue OMT for lumbar and thoracic back pain in 1 month  Encourage trying Robaxin  Try Voltaren gel QID as needed for lumbar, thoracic, and lateral epicondylitis   Restart PT for thoracic back pain and lateral epicondylitis   Discussed the importance of exercises and sleep and how it can help reduce back pain  Follow up weight management  Follow up lumbar xrays- patient has not gotten it done  Follow up in  1-3 months for physical

## 2024-03-07 NOTE — PROGRESS NOTES
Assessment/Plan:    Chronic bilateral low back pain without sciatica  Mid thoracic back and lumbar back pain  Has not tried Robaxin yet, does not like to take pills regularly for pain like ibuprofen or tylenol for pain     Plan:  Continue OMT for lumbar and thoracic back pain in 1 month  Encourage trying Robaxin  Try Voltaren gel QID as needed for lumbar, thoracic, and lateral epicondylitis   Restart PT for thoracic back pain and lateral epicondylitis   Discussed the importance of exercises and sleep and how it can help reduce back pain  Follow up weight management  Follow up lumbar xrays- patient has not gotten it done  Follow up in  1-3 months for physical     Acute bilateral thoracic back pain  New bilateral thoracic back pain with occasional numbness and tingling down arms with sleep, likely positional. Denies any hand  weakness. Denies any trauma but started to have right elbow pain. Patient is likely overcompensating for her new right lateral epicondylitis. Multifactorial with elevated BMI, patient working with lifestyle changes for weight loss and overcompensation from other MSK spasms    Plan:  Continue OMT for lumbar and thoracic back pain and right lateral epicondylitis in 1 month  Encourage trying Robaxin  Try Voltaren gel QID as needed for lumbar, thoracic, and lateral epicondylitis   Restart PT for thoracic back pain and lateral epicondylitis   Consider rediscuss about going to weight management   Follow up in  1-3 months for physical     Lateral epicondylitis of right elbow  New right elbow pain, most likely due to overuse at work and helping with her kids.   Denies any hand  weakness. Sensation intact. Weakness with wrist extension.       Plan:  OMT  lateral epicondylitis in 1 month  Try Voltaren gel QID as needed for lateral epicondylitis   Restart PT for thoracic back pain and lateral epicondylitis   Follow up in  1-3 months for physical          Diagnoses and all orders for this  visit:    Chronic bilateral low back pain without sciatica  -     Ambulatory Referral to Physical Therapy; Future  -     Diclofenac Sodium (VOLTAREN) 1 %; Apply 2 g topically 4 (four) times a day For pain to affected area    Lateral epicondylitis of right elbow  -     Ambulatory Referral to Physical Therapy; Future  -     Diclofenac Sodium (VOLTAREN) 1 %; Apply 2 g topically 4 (four) times a day For pain to affected area    Acute bilateral thoracic back pain  -     Ambulatory Referral to Physical Therapy; Future  -     Diclofenac Sodium (VOLTAREN) 1 %; Apply 2 g topically 4 (four) times a day For pain to affected area            Subjective:      Patient ID: Lucina Barrett is a 38 y.o. female.    37 yo female presents for follow up for lower back pain. Reports mid thoracic upper back pain, reports she feels she can't get a deep breath in due to pain, feels tightness between shoulder bladders. Patient is constantly on feet at work ([employer]) and at home. Denies any injuries. No injuries or trauma. Sometimes numbness down bilateral arms, mostly at night. Gets it when has above head for a prolonged period of time.  Reports dull lower back pain, bilaterally, Does have sharp episodes but since treatment overall intensity of sharpness has been reduced. No numbness or tingling down lets. Slow left hip.     Haven't started propranolol  40mg yet per Neurology for migraines. Haven't tried Robaxin. Occasional lidocaine patch. No tylenol or ibuprofen. Not taking allieve due to increased sumatriptan.     She has not gotten her X-rays since last office visit and has not scheduled with weight management.     The following portions of the patient's history were reviewed and updated as appropriate: allergies, current medications, past family history, past medical history, past social history, past surgical history and problem list.    Review of Systems   Constitutional:  Negative for unexpected weight change.   Respiratory:   Negative for shortness of breath.    Cardiovascular:  Negative for chest pain.   Gastrointestinal:  Negative for abdominal pain.   Musculoskeletal:  Positive for arthralgias (right elbow), back pain and neck pain.   Neurological:  Positive for weakness and numbness.         Objective:      /70 (BP Location: Right arm, Patient Position: Sitting, Cuff Size: Large)   Pulse 93   Temp 98.2 °F (36.8 °C) (Tympanic)   Resp 18   Ht 5' (1.524 m)   Wt 101 kg (222 lb)   SpO2 98%   BMI 43.36 kg/m²          Physical Exam  Vitals and nursing note reviewed.   Constitutional:       Appearance: Normal appearance.      Comments: Body mass index is 42.89 kg/m².     Cardiovascular:      Rate and Rhythm: Normal rate and regular rhythm.      Heart sounds: No murmur heard.  Pulmonary:      Effort: Pulmonary effort is normal. No respiratory distress.      Breath sounds: No wheezing, rhonchi or rales.   Abdominal:      Comments: Decreased due to shallow breathes   Musculoskeletal:      Right shoulder: No tenderness or bony tenderness.      Left shoulder: No tenderness or bony tenderness.      Right elbow: Decreased range of motion. Tenderness present in lateral epicondyle. No medial epicondyle tenderness.      Left elbow: Normal range of motion. No tenderness. No medial epicondyle or lateral epicondyle tenderness.      Right wrist: Decreased range of motion (decreased right wrist extension and weakness).      Left wrist: Normal range of motion.      Lumbar back: Spasms present. No tenderness or bony tenderness.      Comments: Extremely hypertonicity in lumbar back paraspinal muscles bilaterally. Extremely hypertonic in thoracic paraspinal muscles bilaterally, worse between T4-T7   Neurological:      Mental Status: She is alert.      Sensory: Sensation is intact.      Motor: Weakness present.      Comments: Decreased strength in right wrist extension. Sensation in upper arm bilaterally and symmetrically intact

## 2024-03-12 ENCOUNTER — TELEPHONE (OUTPATIENT)
Dept: NEUROLOGY | Facility: CLINIC | Age: 39
End: 2024-03-12

## 2024-03-12 NOTE — TELEPHONE ENCOUNTER
Called patient left voicemail re: upcoming appointment with  on 5/23/24. Will now be at new permanent location in [place]. Left call back number and mailed appointment card.

## 2024-03-20 ENCOUNTER — OFFICE VISIT (OUTPATIENT)
Dept: FAMILY MEDICINE CLINIC | Facility: CLINIC | Age: 39
End: 2024-03-20
Payer: MEDICARE

## 2024-03-20 VITALS
TEMPERATURE: 99.3 F | BODY MASS INDEX: 43.35 KG/M2 | HEIGHT: 60 IN | HEART RATE: 99 BPM | WEIGHT: 220.8 LBS | SYSTOLIC BLOOD PRESSURE: 120 MMHG | OXYGEN SATURATION: 99 % | DIASTOLIC BLOOD PRESSURE: 72 MMHG

## 2024-03-20 DIAGNOSIS — J02.9 SORE THROAT: Primary | ICD-10-CM

## 2024-03-20 LAB
S PYO AG THROAT QL: NEGATIVE
SARS-COV-2 AG UPPER RESP QL IA: NEGATIVE
SL AMB POCT RAPID FLU A: NEGATIVE
SL AMB POCT RAPID FLU B: NEGATIVE
VALID CONTROL: NORMAL

## 2024-03-20 PROCEDURE — 87804 INFLUENZA ASSAY W/OPTIC: CPT

## 2024-03-20 PROCEDURE — 87070 CULTURE OTHR SPECIMN AEROBIC: CPT

## 2024-03-20 PROCEDURE — 87811 SARS-COV-2 COVID19 W/OPTIC: CPT

## 2024-03-20 PROCEDURE — 99212 OFFICE O/P EST SF 10 MIN: CPT

## 2024-03-20 NOTE — ASSESSMENT & PLAN NOTE
Patient reports 2 days of very worsening sore throat  Associated symptoms include subjective fevers and painful adenopathy  Patient has been attempting supportive measures at home and using over-the-counter DayQuil for symptom alleviation    Plan:  POCT COVID, flu, and strp negative  Strep culture collected  Recommendation to continue conservative measures with expectation of symptom improvement in the next few days  Will follow-up strep culture results

## 2024-03-20 NOTE — PROGRESS NOTES
Name: Lucina Barrett      : 1985      MRN: [id number]  Encounter Provider: Amelia Robles DO  Encounter Date: 3/20/2024   Encounter department: [facility]    Assessment & Plan     1. Sore throat  Assessment & Plan:  Patient reports 2 days of very worsening sore throat  Associated symptoms include subjective fevers and painful adenopathy  Patient has been attempting supportive measures at home and using over-the-counter DayQuil for symptom alleviation    Plan:  POCT COVID, flu, and strp negative  Strep culture collected  Recommendation to continue conservative measures with expectation of symptom improvement in the next few days  Will follow-up strep culture results      Orders:  -     POCT rapid strep A  -     POCT Rapid Covid Ag  -     POCT rapid flu A and B  -     Throat culture; Future  -     Throat culture         Subjective      HPI  38-year-old female presenting to the office with a concern of cough and fever that began 2 days ago.  Symptoms started Monday morning with a sore throat and progressed throughout the day. The next day she was fatigued and feverish with a sore throat. Symptoms have not improved. Dayquil helped a little bit, but did not alleviate symptoms.  She has been using warm teas and lozenges for some symptomatic relief. She has no known sick contacts.       Review of Systems   Constitutional:  Positive for fever.   HENT:  Positive for sore throat. Negative for congestion, ear pain and sinus pain.    Respiratory:  Negative for cough, shortness of breath and wheezing.    Cardiovascular:  Negative for chest pain, palpitations and leg swelling.   Gastrointestinal:  Negative for abdominal pain, constipation, diarrhea, nausea and vomiting.   Skin:  Negative for rash.   Neurological:  Negative for light-headedness.       Current Outpatient Medications on File Prior to Visit   Medication Sig   • Diclofenac Sodium (VOLTAREN) 1 % Apply 2 g topically 4  (four) times a day For pain to affected area   • lidocaine (LIDODERM) 5 % Apply 1 patch topically over 12 hours every 24 hours Remove & Discard patch within 12 hours or as directed by MD   • propranolol (INDERAL) 40 mg tablet Start with 40 mg at bed time and then increase the dose to 80 mg in a week's time, if no relief.   • SUMAtriptan (IMITREX) 50 mg tablet Take 1 tablet (50 mg total) by mouth once as needed for migraine (If it does not work over 2h can take a second tablet) for up to 90 doses   • methocarbamol (ROBAXIN) 500 mg tablet Take 1 tablet (500 mg total) by mouth 4 (four) times a day for 7 days (Patient not taking: Reported on 2/19/2024)       Objective     /72 (BP Location: Right arm, Patient Position: Sitting, Cuff Size: Large)   Pulse 99   Temp 99.3 °F (37.4 °C) (Tympanic)   Ht 5' (1.524 m)   Wt 100 kg (220 lb 12.8 oz)   SpO2 99%   BMI 43.12 kg/m²     Physical Exam  Vitals reviewed.   Constitutional:       General: She is not in acute distress.     Appearance: Normal appearance.   HENT:      Head: Normocephalic and atraumatic.      Right Ear: Tympanic membrane, ear canal and external ear normal. No tenderness.      Left Ear: Tympanic membrane, ear canal and external ear normal. No tenderness.      Nose: Nose normal. No congestion or rhinorrhea.      Mouth/Throat:      Mouth: Mucous membranes are moist. No oral lesions.      Pharynx: Uvula midline. Pharyngeal swelling and posterior oropharyngeal erythema present. No oropharyngeal exudate or uvula swelling.   Eyes:      General:         Right eye: No discharge.         Left eye: No discharge.      Conjunctiva/sclera: Conjunctivae normal.   Cardiovascular:      Rate and Rhythm: Normal rate and regular rhythm.      Heart sounds: Normal heart sounds. No murmur heard.  Pulmonary:      Effort: Pulmonary effort is normal. No respiratory distress.      Breath sounds: No stridor. No wheezing, rhonchi or rales.   Abdominal:      General: Bowel sounds  are normal. There is no distension.      Palpations: Abdomen is soft.      Tenderness: There is no abdominal tenderness. There is no guarding or rebound.   Musculoskeletal:      Cervical back: Neck supple.      Right lower leg: No edema.      Left lower leg: No edema.   Skin:     General: Skin is warm and dry.   Neurological:      Mental Status: She is alert.       Amelia Robles, DO

## 2024-03-23 LAB — BACTERIA THROAT CULT: NORMAL

## 2024-04-01 ENCOUNTER — PROCEDURE VISIT (OUTPATIENT)
Dept: FAMILY MEDICINE CLINIC | Facility: CLINIC | Age: 39
End: 2024-04-01
Payer: MEDICARE

## 2024-04-01 DIAGNOSIS — G89.29 CHRONIC BILATERAL LOW BACK PAIN WITHOUT SCIATICA: Primary | ICD-10-CM

## 2024-04-01 DIAGNOSIS — M99.02 SOMATIC DYSFUNCTION OF SPINE, THORACIC: ICD-10-CM

## 2024-04-01 DIAGNOSIS — M99.00 SOMATIC DYSFUNCTION OF HEAD REGION: ICD-10-CM

## 2024-04-01 DIAGNOSIS — M54.50 CHRONIC BILATERAL LOW BACK PAIN WITHOUT SCIATICA: Primary | ICD-10-CM

## 2024-04-01 DIAGNOSIS — M99.04 SOMATIC DYSFUNCTION OF SACRAL SPINE: ICD-10-CM

## 2024-04-01 DIAGNOSIS — M99.07 SOMATIC DYSFUNCTION OF RIGHT UPPER EXTREMITY: ICD-10-CM

## 2024-04-01 DIAGNOSIS — M99.01 SOMATIC DYSFUNCTION OF CERVICAL REGION: ICD-10-CM

## 2024-04-01 DIAGNOSIS — M99.03 SOMATIC DYSFUNCTION OF LUMBAR REGION: ICD-10-CM

## 2024-04-01 DIAGNOSIS — M54.6 ACUTE BILATERAL THORACIC BACK PAIN: ICD-10-CM

## 2024-04-01 DIAGNOSIS — M77.11 LATERAL EPICONDYLITIS OF RIGHT ELBOW: ICD-10-CM

## 2024-04-01 PROCEDURE — 98929 OSTEOPATH MANJ 9-10 REGIONS: CPT

## 2024-04-01 NOTE — PROGRESS NOTES
The Assessment/Plan     This is a 38 y.o. female who presents for OMT follow-up for:  1. Chronic bilateral low back pain without sciatica  OMT      2. Lateral epicondylitis of right elbow  OMT      3. Acute bilateral thoracic back pain  OMT      4. Somatic dysfunction of head region  OMT      5. Somatic dysfunction of cervical region  OMT      6. Somatic dysfunction of right upper extremity  OMT      7. Somatic dysfunction of lumbar region  OMT      8. Somatic dysfunction of spine, thoracic  OMT      9. Somatic dysfunction of sacral spine  OMT             1. Patient tolerated OMT well for the above problems,  advised patient to drink fluids and can use NSAID for soreness after treatment     2. OMT Follow up in 2 weeks.        Subjective     Lucina Barrett is a 38 y.o. female and is here for a OMT follow up.     Reports bilateral lumbar back pain without sciatica. Does reports upper back pain, numbness down arms bilaterally. 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 03/28, mianly on right side. Reports taking Sumatriptan 50mg- which work faster but not longer, therefore patient is cutting them in half. Reports some dizziness with pills but manageable. Denies any N/V, photophobia, and phonophobia.       Is the patient taking Pain medication? no  Has the patient completed physical therapy for this condition? yes-Finished  Did Patient symptoms improve from last OMT appointment? yes    The following portions of the patient's history were reviewed and updated as appropriate: allergies, current medications, past family history, past medical history, past social history, past surgical history and problem list.    Review of Systems  Review of Systems   Eyes:  Positive for photophobia. Negative for visual disturbance.   Gastrointestinal:  Negative for nausea and vomiting.   Musculoskeletal:  Positive for arthralgias, back pain and gait problem. Negative for myalgias and neck pain.   Neurological:   Positive for headaches. Negative for dizziness, light-headedness and numbness.         Objective     OMT Exam     OMT    Performed by: Abigail Hopkins DO  Authorized by: Abigail Hopkins DO  Universal Protocol:  Consent: Verbal consent obtained.  Consent given by: patient  Patient identity confirmed: verbally with patient      Procedure Details:     Region evaluated and treated:  Head, Cervical, Lumbar, Sacrum/Pelvis, Right Extremities and Thoracic    Extremity Information  Extremities: right upper extremity    Thoracic Information  Thoracic Region: T1 - T4 and T5 - T9  Head Details:     Examination Method:  Tissue Texture Change, Stability, Laxity, Effusions, Tone, Asymmetry, Misalignment, Crepitation, Defects, Masses, Tenderness, Pain and Range of Motion, Contracture    Severity:  Moderate    Osteopathic Findings:  Right occipital hypertonicity  Right cervical hypertonicity  T6 and T 7 right transverse tender point  T5-T8 SRRR  Right trapezius hypertonicity     Treatment Method:  Indirect Treatment, Muscle Energy Treatment, Myofascial Release Treatment, Soft Tissue Treatment, Counterstrain Treatment and Direct Treatment    Response:  Resolved  - The somatic dysfunction is completed resovled without evidence of it ever having been present.    Cervical Details:     Examination Method:  Tissue Texture Change, Stability, Laxity, Effusions, Tone, Range of Motion, Contracture, Asymmetry, Misalignment, Crepitation, Defects, Masses and Tenderness, Pain    Severity:  Moderate    Osteopathic Findings:  Right occipital hypertonicity  Right cervical hypertonicity  T6 and T 7 right transverse tender point  T5-T8 SRRR  Right trapezius hypertonicity     Treatment Method:  Counterstrain Treatment, Direct Treatment, Indirect Treatment, Muscle Energy Treatment, Soft Tissue Treatment and Myofascial Release Treatment    Response:  Resolved - The somatic dysfunction is completely resolved without evidence of it ever  having been present.    Thoracic T1 - T4 details:     Examination Method:  Tissue Texture Change, Stability, Laxity, Effusions, Tone, Asymmetry, Misalignment, Crepitation, Defects, Masses, Tenderness, Pain and Range of Motion, Contracture    Severity:  Moderate    Osteopathic Findings:  Right thoracic paraspinal hypertonicity  T1-2 RLSR    Treatment Method:  Direct Treatment, Indirect Treatment, Muscle Energy Treatment, Soft Tissue Treatment and Myofascial Release Treatment    Response:  Resolved    Thoracic T5 - T9 details:     Examination Method:  Tissue Texture Change, Stability, Laxity, Effusions, Tone, Asymmetry, Misalignment, Crepitation, Defects, Masses and Tenderness, Pain    Severity:  Moderate    Osteopathic Findings:  Right thoracic paraspinal hypertonicity  T6-8 RLSR    Treatment Method:  Direct Treatment, Muscle Energy Treatment, Soft Tissue Treatment and Myofascial Release Treatment    Response:  Resolved - The somatic dysfunction is completely resolved without evidence of it ever having been present.    Lumbar details:     Examination Method:  Tissue Texture Change, Stability, Laxity, Effusions, Tone, Asymmetry, Misalignment, Crepitation, Defects, Masses and Tenderness, Pain    Severity:  Moderate    Osteopathic Findings:  Bilateral lumbar paraspinal hypertonicity   Right Quadratus Lumborum hypertonicity   Left piriformis tender point     Treatment Method:  Counterstrain Treatment, Direct Treatment, Indirect Treatment, Muscle Energy Treatment, Myofascial Release Treatment and Soft Tissue Treatment    Response:  Improved - The somatic dysfunction is improved but not completely resolved.    Sacrum/Pelvis details:     Examination Method:  Tissue Texture Change, Stability, Laxity, Effusions, Tone, Asymmetry, Misalignment, Crepitation, Defects, Masses and Tenderness, Pain    Severity:  Moderate    Osteopathic Findings:  L on R sacral torsion  Left piriformis tenderpoint  Bilateral ASIS tender point      Treatment Method:  Counterstrain Treatment, Direct Treatment, Indirect Treatment and Muscle Energy Treatment    Response:  Improved - The somatic dysfunction is improved but not completely resolved.    Right Upper Extremity details:     Examination Method:  Asymmetry, Misalignment, Crepitation, Defects, Masses, Tenderness, Pain and Range of Motion, Contracture    Severity:  Moderate    Osteopathic Findings:  Right anterior radial head     Treatment Method:  Direct Treatment and Muscle Energy Treatment    Total Regions Treated:  6  Attending provider present in exam room for procedure: No

## 2024-04-15 ENCOUNTER — OFFICE VISIT (OUTPATIENT)
Dept: FAMILY MEDICINE CLINIC | Facility: CLINIC | Age: 39
End: 2024-04-15

## 2024-04-15 VITALS
RESPIRATION RATE: 18 BRPM | HEIGHT: 60 IN | WEIGHT: 223 LBS | TEMPERATURE: 98.2 F | BODY MASS INDEX: 43.78 KG/M2 | HEART RATE: 99 BPM | DIASTOLIC BLOOD PRESSURE: 70 MMHG | SYSTOLIC BLOOD PRESSURE: 118 MMHG | OXYGEN SATURATION: 98 %

## 2024-04-15 DIAGNOSIS — Z00.00 ANNUAL PHYSICAL EXAM: Primary | ICD-10-CM

## 2024-04-15 DIAGNOSIS — E66.01 CLASS 3 SEVERE OBESITY DUE TO EXCESS CALORIES WITHOUT SERIOUS COMORBIDITY WITH BODY MASS INDEX (BMI) OF 40.0 TO 44.9 IN ADULT (HCC): ICD-10-CM

## 2024-04-15 DIAGNOSIS — Z13.6 ENCOUNTER FOR LIPID SCREENING FOR CARDIOVASCULAR DISEASE: ICD-10-CM

## 2024-04-15 DIAGNOSIS — Z13.1 SCREENING FOR DIABETES MELLITUS: ICD-10-CM

## 2024-04-15 DIAGNOSIS — Z13.220 ENCOUNTER FOR LIPID SCREENING FOR CARDIOVASCULAR DISEASE: ICD-10-CM

## 2024-04-15 DIAGNOSIS — Z23 ENCOUNTER FOR IMMUNIZATION: ICD-10-CM

## 2024-04-15 NOTE — PROGRESS NOTES
ADULT ANNUAL PHYSICAL  [facility]    NAME: Lucina Barrett  AGE: 38 y.o. SEX: female  : 1985     DATE: 4/15/2024     Assessment and Plan:     Problem List Items Addressed This Visit       Annual physical exam - Primary     Preventative Blood work  Lipid panel for CVD-2023: TC: 143, HDL: 37, T, LDL: 106  BMP for diabetes- 23  Elevated fasting Glucose: 103  Cr: 0.55, GFR: 119  HIV- non-reactive, 2022  Hep C- non-reactive, 23  Preventative Screenings:  Mammo-N/A  Colonscopy-N/A, no family history  Pap Smear-2022, negative HPV and cytology, next due 2027  Dexa-N/A  Depression Screening  Counseling  Lifestyle changes  Goal: Exercise with exercise negar for 20 minutes per day. If she doesn't do exercise negar, discussed doing at home You-Tube exercise videos  Follow up in 1 month for lifestyle medicine.   Weight management referral.   Immunizations  Tdap-2022  Flu- declined            Other Visit Diagnoses       Encounter for immunization        Encounter for lipid screening for cardiovascular disease        Relevant Orders    Lipid panel    Screening for diabetes mellitus        Relevant Orders    Basic metabolic panel    Class 3 severe obesity due to excess calories without serious comorbidity with body mass index (BMI) of 40.0 to 44.9 in adult (HCC)        Relevant Orders    TSH, 3rd generation with Free T4 reflex    Ambulatory Referral to Weight Management              Immunizations and preventive care screenings were discussed with patient today. Appropriate education was printed on patient's after visit summary.    Counseling:  Alcohol/drug use: discussed moderation in alcohol intake, the recommendations for healthy alcohol use, and avoidance of illicit drug use.  Dental Health: discussed importance of regular tooth brushing, flossing, and dental visits.  Injury prevention: discussed safety/seat belts,  safety helmets, smoke detectors, carbon dioxide detectors, and smoking near bedding or upholstery.  Sexual health: discussed sexually transmitted diseases, partner selection, use of condoms, avoidance of unintended pregnancy, and contraceptive alternatives.  Exercise: the importance of regular exercise/physical activity was discussed. Recommend exercise 3-5 times per week for at least 30 minutes.            Physical Activity Assessment and Intervention:    Physical Activity Prescription completed with patient    Physical activity online resources/apps provided to patient      Other interventions: Goal: Exercise with exercise negar for 20 minutes per day. If she doesn't do exercise negar, discussed doing at home You-Tube exercise videos  Follow up in 1 month for lifestyle medicine.   Weight management referral.         No follow-ups on file.     Chief Complaint:     Chief Complaint   Patient presents with    Physical Exam      History of Present Illness:     Adult Annual Physical   Patient here for a comprehensive physical exam. The patient reports no problems.    Diet and Physical Activity  Diet/Nutrition: well balanced diet. Nothing for breakfast, lunch -salad bowels and roasted chicken, dinner- meat, carbohydrate, lots vegetables. No frequent snacks. Reports good portion control.  Exercise: no formal exercise. Walking irritates her back. She used to like walking but lower back irritates it. Interested in fitness negar.       General Health  Sleep: gets 4-6 hours of sleep on average. Plans for a sleep study. Wakes up constantly through out night. Tosses and turns. Pain occasionally makes.   Hearing: normal - bilateral.  Vision: no vision problems and most recent eye exam >1 year ago.   Dental: regular dental visits, brushes teeth twice daily, and flosses teeth occasionally.       /GYN Health  Follows with gynecology? yes   Last menstrual period: 04/13, reports about 3-8 days, but has not been regular. Irregular, some  times monthly, sometimes 2 months. Reports abnormal since tubal ligation in 2023.   Contraceptive method: Tubal Ligation.  History of STDs?: No        Review of Systems:     Review of Systems   Constitutional:  Negative for chills and fever.   HENT:  Negative for ear pain and sore throat.    Eyes:  Negative for pain and visual disturbance.   Respiratory:  Negative for cough and shortness of breath.    Cardiovascular:  Negative for chest pain and palpitations.   Gastrointestinal:  Negative for abdominal pain and vomiting.   Genitourinary:  Negative for dysuria and hematuria.   Musculoskeletal:  Positive for back pain. Negative for arthralgias.   Skin:  Negative for color change and rash.   Neurological:  Positive for headaches.   All other systems reviewed and are negative.     Past Medical History:     Past Medical History:   Diagnosis Date    Fetal macrosomia 2022    Gallbladder attack     History of  delivery, currently pregnant 2022    3 spontaneous  deliveries, earliest at 22 weeks 35 weeks  S/p cervical length surveillance through M    Kidney stones     Migraines     Varicella     Von Willebrand disease (HCC)     patient is carrier      Past Surgical History:     Past Surgical History:   Procedure Laterality Date    EXAMINATION UNDER ANESTHESIA N/A 2023    Procedure: EXAM UNDER ANESTHESIA (EUA);  Surgeon: Sun Ayala MD;  Location: [facility];  Service: Gynecology    LAPAROSCOPIC CHOLECYSTECTOMY      LITHOTRIPSY      SC LAPAROSCOPY W/RMVL ADNEXAL STRUCTURES Bilateral 2023    Procedure: SALPINGECTOMY, LAPAROSCOPIC, Examine under Anesthesia;  Surgeon: Sun Ayala MD;  Location: [facility];  Service: Gynecology    TONSILLECTOMY      TONSILLECTOMY        Social History:     Social History     Socioeconomic History    Marital status: Single     Spouse name: None    Number of children: None    Years of education: None    Highest education level: None    Occupational History    None   Tobacco Use    Smoking status: Never    Smokeless tobacco: Never   Vaping Use    Vaping status: Never Used   Substance and Sexual Activity    Alcohol use: No    Drug use: No    Sexual activity: Yes     Partners: Male     Birth control/protection: None   Other Topics Concern    None   Social History Narrative    None     Social Determinants of Health     Financial Resource Strain: Low Risk  (1/23/2023)    Overall Financial Resource Strain (CARDIA)     Difficulty of Paying Living Expenses: Not hard at all   Food Insecurity: No Food Insecurity (1/23/2023)    Hunger Vital Sign     Worried About Running Out of Food in the Last Year: Never true     Ran Out of Food in the Last Year: Never true   Transportation Needs: No Transportation Needs (1/23/2023)    PRAPARE - Transportation     Lack of Transportation (Medical): No     Lack of Transportation (Non-Medical): No   Physical Activity: Sufficiently Active (3/24/2021)    Exercise Vital Sign     Days of Exercise per Week: 5 days     Minutes of Exercise per Session: 60 min   Stress: No Stress Concern Present (3/24/2021)    [place] [employer] - Occupational Stress Questionnaire     Feeling of Stress : Not at all   Social Connections: Socially Isolated (3/24/2021)    Social Connection and Isolation Panel [NHANES]     Frequency of Communication with Friends and Family: More than three times a week     Frequency of Social Gatherings with Friends and Family: More than three times a week     Attends Anabaptist Services: Never     Active Member of Clubs or Organizations: No     Attends Club or Organization Meetings: Never     Marital Status: Never    Intimate Partner Violence: Not At Risk (5/5/2022)    Humiliation, Afraid, Rape, and Kick questionnaire     Fear of Current or Ex-Partner: No     Emotionally Abused: No     Physically Abused: No     Sexually Abused: No   Housing Stability: Low Risk  (1/23/2023)    Housing  Stability Vital Sign     Unable to Pay for Housing in the Last Year: No     Number of Places Lived in the Last Year: 1     Unstable Housing in the Last Year: No      Family History:     Family History   Problem Relation Age of Onset    No Known Problems Mother     No Known Problems Father     No Known Problems Sister     No Known Problems Brother     No Known Problems Daughter     No Known Problems Son     No Known Problems Maternal Grandfather     No Known Problems Sister     No Known Problems Sister     No Known Problems Daughter       Current Medications:     Current Outpatient Medications   Medication Sig Dispense Refill    Diclofenac Sodium (VOLTAREN) 1 % Apply 2 g topically 4 (four) times a day For pain to affected area 100 g 0    lidocaine (LIDODERM) 5 % Apply 1 patch topically over 12 hours every 24 hours Remove & Discard patch within 12 hours or as directed by MD 15 patch 0    propranolol (INDERAL) 40 mg tablet Start with 40 mg at bed time and then increase the dose to 80 mg in a week's time, if no relief. 90 tablet 3    SUMAtriptan (IMITREX) 50 mg tablet Take 1 tablet (50 mg total) by mouth once as needed for migraine (If it does not work over 2h can take a second tablet) for up to 90 doses 8 tablet 3    methocarbamol (ROBAXIN) 500 mg tablet Take 1 tablet (500 mg total) by mouth 4 (four) times a day for 7 days (Patient not taking: Reported on 2/19/2024) 28 tablet 0     No current facility-administered medications for this visit.      Allergies:     Allergies   Allergen Reactions    Latex Anaphylaxis, Hives and Anxiety     Category: Allergy; Annotation - [id number]: HIVES      Physical Exam:     /70 (BP Location: Left arm, Patient Position: Sitting, Cuff Size: Large)   Pulse 99   Temp 98.2 °F (36.8 °C) (Tympanic)   Resp 18   Ht 5' (1.524 m)   Wt 101 kg (223 lb)   SpO2 98%   BMI 43.55 kg/m²     Physical Exam  Vitals and nursing note reviewed.   Constitutional:       General: She is not in acute  distress.     Appearance: She is well-developed.   HENT:      Head: Normocephalic and atraumatic.      Right Ear: Tympanic membrane, ear canal and external ear normal. There is no impacted cerumen.      Left Ear: Tympanic membrane, ear canal and external ear normal. There is no impacted cerumen.      Nose: Nose normal. No congestion or rhinorrhea.      Mouth/Throat:      Mouth: Mucous membranes are moist.      Pharynx: Oropharynx is clear. No oropharyngeal exudate or posterior oropharyngeal erythema.   Eyes:      General:         Right eye: No discharge.         Left eye: No discharge.      Extraocular Movements: Extraocular movements intact.      Conjunctiva/sclera: Conjunctivae normal.      Pupils: Pupils are equal, round, and reactive to light.   Cardiovascular:      Rate and Rhythm: Normal rate and regular rhythm.      Heart sounds: No murmur heard.  Pulmonary:      Effort: Pulmonary effort is normal. No respiratory distress.      Breath sounds: Normal breath sounds. No stridor. No wheezing, rhonchi or rales.   Chest:      Chest wall: No tenderness.   Abdominal:      General: Bowel sounds are normal. There is no distension.      Palpations: Abdomen is soft.      Tenderness: There is no abdominal tenderness. There is no guarding.   Musculoskeletal:         General: No swelling.      Cervical back: Neck supple.      Right lower leg: No edema.      Left lower leg: No edema.   Lymphadenopathy:      Cervical: No cervical adenopathy.   Skin:     General: Skin is warm and dry.      Capillary Refill: Capillary refill takes less than 2 seconds.   Neurological:      Mental Status: She is alert.   Psychiatric:         Mood and Affect: Mood normal.          Abigail Hopkins DO   [facility]

## 2024-04-15 NOTE — ASSESSMENT & PLAN NOTE
Preventative Blood work  Lipid panel for CVD-2023: TC: 143, HDL: 37, T, LDL: 106  BMP for diabetes- 23  Elevated fasting Glucose: 103  Cr: 0.55, GFR: 119  HIV- non-reactive, 2022  Hep C- non-reactive, 23  Preventative Screenings:  Mammo-N/A  Colonscopy-N/A, no family history  Pap Smear-2022, negative HPV and cytology, next due 2027  Dexa-N/A  Depression Screening  Counseling  Lifestyle changes  Goal: Exercise with exercise negar for 20 minutes per day. If she doesn't do exercise negar, discussed doing at home You-Tube exercise videos  Follow up in 1 month for lifestyle medicine.   Weight management referral.   Immunizations  Tdap-2022  Flu- declined

## 2024-04-16 ENCOUNTER — TELEPHONE (OUTPATIENT)
Dept: FAMILY MEDICINE CLINIC | Facility: CLINIC | Age: 39
End: 2024-04-16

## 2024-04-16 DIAGNOSIS — G89.29 CHRONIC BILATERAL LOW BACK PAIN WITHOUT SCIATICA: ICD-10-CM

## 2024-04-16 DIAGNOSIS — M54.50 CHRONIC BILATERAL LOW BACK PAIN WITHOUT SCIATICA: ICD-10-CM

## 2024-04-16 RX ORDER — LIDOCAINE 50 MG/G
1 PATCH TOPICAL EVERY 24 HOURS
Qty: 15 PATCH | Refills: 0 | Status: SHIPPED | OUTPATIENT
Start: 2024-04-16

## 2024-04-16 NOTE — TELEPHONE ENCOUNTER
Lidoderm 5 % not covered may wasn't to try the 4 %. Most plans do not cover Lidoderm unless being used fof herpetic neuralgia.

## 2024-04-18 NOTE — TELEPHONE ENCOUNTER
Please call the patient and let her know she can use the over the counter Lidoderm 4% patches if it works for her.

## 2024-04-22 ENCOUNTER — PROCEDURE VISIT (OUTPATIENT)
Dept: FAMILY MEDICINE CLINIC | Facility: CLINIC | Age: 39
End: 2024-04-22
Payer: MEDICARE

## 2024-04-22 DIAGNOSIS — M99.06 SOMATIC DYSFUNCTION OF LEFT LOWER EXTREMITY: ICD-10-CM

## 2024-04-22 DIAGNOSIS — G43.909 MIGRAINE WITHOUT STATUS MIGRAINOSUS, NOT INTRACTABLE, UNSPECIFIED MIGRAINE TYPE: ICD-10-CM

## 2024-04-22 DIAGNOSIS — M99.01 SOMATIC DYSFUNCTION OF CERVICAL REGION: ICD-10-CM

## 2024-04-22 DIAGNOSIS — G89.29 CHRONIC BILATERAL LOW BACK PAIN WITHOUT SCIATICA: Primary | ICD-10-CM

## 2024-04-22 DIAGNOSIS — M99.00 SOMATIC DYSFUNCTION OF HEAD REGION: ICD-10-CM

## 2024-04-22 DIAGNOSIS — M77.11 LATERAL EPICONDYLITIS OF RIGHT ELBOW: ICD-10-CM

## 2024-04-22 DIAGNOSIS — M99.04 SOMATIC DYSFUNCTION OF SACRAL SPINE: ICD-10-CM

## 2024-04-22 DIAGNOSIS — M99.03 SOMATIC DYSFUNCTION OF LUMBAR REGION: ICD-10-CM

## 2024-04-22 DIAGNOSIS — M99.07 SOMATIC DYSFUNCTION OF RIGHT UPPER EXTREMITY: ICD-10-CM

## 2024-04-22 DIAGNOSIS — M54.50 CHRONIC BILATERAL LOW BACK PAIN WITHOUT SCIATICA: Primary | ICD-10-CM

## 2024-04-22 PROCEDURE — 99213 OFFICE O/P EST LOW 20 MIN: CPT

## 2024-04-22 NOTE — PROGRESS NOTES
The Assessment/Plan     This is a 38 y.o. female who presents for OMT follow-up for:  1. Chronic bilateral low back pain without sciatica  OMT      2. Lateral epicondylitis of right elbow  OMT      3. Migraine without status migrainosus, not intractable, unspecified migraine type  OMT      4. Somatic dysfunction of head region  OMT      5. Somatic dysfunction of cervical region  OMT      6. Somatic dysfunction of right upper extremity  OMT      7. Somatic dysfunction of lumbar region  OMT      8. Somatic dysfunction of sacral spine  OMT      9. Somatic dysfunction of left lower extremity  OMT           1. Patient tolerated OMT well for the above problems,  advised patient to drink fluids and can use NSAID for soreness after treatment     2. OMT Follow up in 3 weeks.    Subjective     Lucina Barrett is a 38 y.o. female and is here for a OMT follow up. The patient reports migriane since last week, reports 2-3 days on right side, 1 day of rest, and then migraine returned on left side. Denies any N/V, vision changes. Associated with photophobia and phonophobia. Reports taking Alieve and Sumatriptan. Hasn't started propranolol yet. Sumatriptain works faster but doesn't last longer. But Reports taking 25mg by cutting them in half so that the script lasts longer.     Right Arm is improving, but painful with bumping and elbow extensions. Improvement with brace and volartean gel.     Reports back pain is unchanged. Having intermittent left sided sciatica that occurs depending on how she moves her left leg. Reports taking 1/2 Robaxin pill prior to OMT session. Denies feeling sleepy.     Is the patient taking Pain medication? yes  Has the patient completed physical therapy for this condition? yes  Did Patient symptoms improve from last OMT appointment? yes    The following portions of the patient's history were reviewed and updated as appropriate: allergies, current medications, past family history, past medical  history, past social history, past surgical history, and problem list.    Review of Systems  Review of Systems   Eyes:  Positive for photophobia. Negative for visual disturbance.   Musculoskeletal:  Positive for back pain. Negative for neck pain.   Neurological:  Positive for numbness and headaches. Negative for weakness.         Objective     OMT Exam     OMT    Performed by: Abigail Hopkins DO  Authorized by: Abigail Hopkins DO  Universal Protocol:  Patient identity confirmed: verbally with patient      Procedure Details:     Region evaluated and treated:  Head, Cervical, Lumbar, Sacrum/Pelvis, Left Extremities and Right Extremities    Extremity Information  Extremities: left lower extremity    Extremity Information  Extremities: right upper extremity    Head Details:     Examination Method:  Tissue Texture Change, Stability, Laxity, Effusions, Tone, Asymmetry, Misalignment, Crepitation, Defects, Masses and Tenderness, Pain    Severity:  Moderate    Osteopathic Findings:  Left occipital paraspinal hypertonicity   Forehead restriction  Left cervical paraspinal hypertonicity  Left trapezius hypertonicity and tenderness  C5 L transverse tender point  SRRR C5      Treatment Method:  Indirect Treatment, Counterstrain Treatment, Direct Treatment, Muscle Energy Treatment, Soft Tissue Treatment and Myofascial Release Treatment    Response:  Improved - The somatic dysfunction is improved but not completely resolved.    Cervical Details:     Examination Method:  Tissue Texture Change, Stability, Laxity, Effusions, Tone, Asymmetry, Misalignment, Crepitation, Defects, Masses, Tenderness, Pain and Range of Motion, Contracture    Severity:  Moderate    Osteopathic Findings:  Left occipital paraspinal hypertonicity   Forehead restriction  Left cervical paraspinal hypertonicity  Left trapezius hypertonicity and tenderness  C5 L transverse tender point  SRRR C5    Treatment Method:  Counterstrain Treatment,  Direct Treatment, Indirect Treatment, Muscle Energy Treatment, Myofascial Release Treatment and Soft Tissue Treatment    Response:  Improved - The somatic dysfunction is improved but not completely resolved.    Lumbar details:     Examination Method:  Tissue Texture Change, Stability, Laxity, Effusions, Tone, Asymmetry, Misalignment, Crepitation, Defects, Masses and Tenderness, Pain    Severity:  Severe    Osteopathic Findings:  Bilateral Quadratus lumborum hypertonicity, left worse than right  L5 RR, SL  Posterior left innominate   Sibley left leg        Treatment Method:  Direct Treatment, Indirect Treatment, Muscle Energy Treatment, Myofascial Release Treatment and Soft Tissue Treatment    Response:  Improved - The somatic dysfunction is improved but not completely resolved.    Sacrum/Pelvis details:     Examination Method:  Tissue Texture Change, Stability, Laxity, Effusions, Tone, Range of Motion, Contracture, Asymmetry, Misalignment, Crepitation, Defects, Masses and Tenderness, Pain    Severity:  Severe    Osteopathic Findings:  Bilateral Quadratus lumborum hypertonicity, left worse than right  L5 RR, SL  Posterior left innominate   Sibley left leg  Left piriformis tender point      Treatment Method:  Counterstrain Treatment, Direct Treatment, Indirect Treatment, Muscle Energy Treatment, Myofascial Release Treatment and Soft Tissue Treatment    Response:  Improved - The somatic dysfunction is improved but not completely resolved.    Left Lower Extremity details:     Examination Method:  Asymmetry, Misalignment, Crepitation, Defects, Masses, Tissue Texture Change, Stability, Laxity, Effusions, Tone and Tenderness, Pain    Severity:  Severe    Osteopathic Findings:  Left hamstring hypertonicity   Internal rotation of left hamstring  Left piriformis tenderpoint     Treatment Method:  Balanced Ligamentous Tension, Ligamentous Articular Strain Treatment, Direct Treatment, Indirect Treatment, Counterstrain  Treatment, Soft Tissue Treatment, Myofascial Release Treatment and Muscle Energy Treatment    Response:  Resolved - The somatic dysfunction is completely resolved without evidence of it ever having been present.    Right Upper Extremity details:     Examination Method:  Tissue Texture Change, Stability, Laxity, Effusions, Tone, Asymmetry, Misalignment, Crepitation, Defects, Masses, Tenderness, Pain and Range of Motion, Contracture    Severity:  Severe    Osteopathic Findings:  Anterior radial head dysfunction     Treatment Method:  Counterstrain Treatment, Muscle Energy Treatment, Soft Tissue Treatment, Direct Treatment and Indirect Treatment    Response:  Improved - The somatic dysfunction is improved but not completely resolved.    Total Regions Treated:  6  Attending provider present in exam room for procedure: No

## 2024-05-15 ENCOUNTER — PROCEDURE VISIT (OUTPATIENT)
Dept: FAMILY MEDICINE CLINIC | Facility: CLINIC | Age: 39
End: 2024-05-15
Payer: MEDICARE

## 2024-05-15 DIAGNOSIS — M99.02 SOMATIC DYSFUNCTION OF SPINE, THORACIC: ICD-10-CM

## 2024-05-15 DIAGNOSIS — M99.04 SOMATIC DYSFUNCTION OF SACRAL SPINE: ICD-10-CM

## 2024-05-15 DIAGNOSIS — M54.50 CHRONIC BILATERAL LOW BACK PAIN WITHOUT SCIATICA: Primary | ICD-10-CM

## 2024-05-15 DIAGNOSIS — G89.29 CHRONIC BILATERAL LOW BACK PAIN WITHOUT SCIATICA: Primary | ICD-10-CM

## 2024-05-15 DIAGNOSIS — M99.03 SOMATIC DYSFUNCTION OF LUMBAR REGION: ICD-10-CM

## 2024-05-15 PROCEDURE — 98926 OSTEOPATH MANJ 3-4 REGIONS: CPT | Performed by: FAMILY MEDICINE

## 2024-05-15 NOTE — PROGRESS NOTES
The Assessment & Plan     This is a 38 y.o. female who presents for OMT follow-up for:  1. Chronic bilateral low back pain without sciatica        2. Somatic dysfunction of lumbar region        3. Somatic dysfunction of sacral spine        4. Somatic dysfunction of spine, thoracic             1. Patient tolerated OMT well for the above problems,  advised patient to drink fluids and can use NSAID for soreness after treatment     2. OMT Follow up in 3 weeks.    Subjective     Lucina Barrett is a 38 y.o. female and is here for a OMT follow up. The patient reports that she has been having ongoing back pain for the past several years. She reports that her back pain is worse in her lower lumbar region. The pain sometimes radiates into her left buttocks. She reports that she has had 5 children and carrying her children seemed to have worsened the back pain.     Is the patient taking Pain medication? yes  Has the patient completed physical therapy for this condition? yes  Did Patient symptoms improve from last OMT appointment? yes    The following portions of the patient's history were reviewed and updated as appropriate: allergies, current medications, past family history, past medical history, past social history, past surgical history, and problem list.    Review of Systems  Review of Systems   Constitutional:  Negative for chills and fever.   HENT:  Negative for ear pain and sore throat.    Eyes:  Negative for pain and visual disturbance.   Respiratory:  Negative for cough and shortness of breath.    Cardiovascular:  Negative for chest pain and palpitations.   Gastrointestinal:  Negative for abdominal pain and vomiting.   Genitourinary:  Negative for dysuria and hematuria.   Musculoskeletal:  Positive for back pain. Negative for arthralgias.   Skin:  Negative for color change and rash.   Neurological:  Negative for seizures and syncope.   All other systems reviewed and are negative.        Objective     OMT Exam      OMT    Performed by: Alma Rosa Vinson DO  Authorized by: Alma Rosa Vinson DO  Universal Protocol:  Procedure performed by: (Dr. Morgan)  Consent: Verbal consent obtained.  Risks and benefits: risks, benefits and alternatives were discussed  Consent given by: patient  Patient identity confirmed: verbally with patient      Procedure Details:     Region evaluated and treated:  Lumbar, Sacrum/Pelvis and Thoracic    Thoracic Information  Thoracic Region: T10 - T12  Thoracic T10 - T12 details:     Examination Method:  Tissue Texture Change, Stability, Laxity, Effusions, Tone, Asymmetry, Misalignment, Crepitation, Defects, Masses and Tenderness, Pain    Severity:  Moderate    Osteopathic Findings:  Hypertonic paraspinal muscles    Treatment Method:  Soft Tissue Treatment, Myofascial Release Treatment and Muscle Energy Treatment    Response:  Improved - The somatic dysfunction is improved but not completely resolved.    Lumbar details:     Examination Method:  Tissue Texture Change, Stability, Laxity, Effusions, Tone, Asymmetry, Misalignment, Crepitation, Defects, Masses, Range of Motion, Contracture and Tenderness, Pain    Severity:  Moderate    Osteopathic Findings:  Hypertonic paraspinal muscles    Treatment Method:  Myofascial Release Treatment, Muscle Energy Treatment and Soft Tissue Treatment    Response:  Improved - The somatic dysfunction is improved but not completely resolved.    Sacrum/Pelvis details:     Examination Method:  Tissue Texture Change, Stability, Laxity, Effusions, Tone, Asymmetry, Misalignment, Crepitation, Defects, Masses and Tenderness, Pain    Severity:  Moderate    Osteopathic Findings:  Hypertonic left piriformis muscle, hypertonic musculature surrounding SI joint     Treatment Method:  Soft Tissue Treatment, Myofascial Release Treatment, Muscle Energy Treatment and Counterstrain Treatment    Response:  Improved - The somatic dysfunction is improved but not completely  resolved.    Total Regions Treated:  3  Attending provider present in exam room for procedure: Yes

## 2024-05-24 ENCOUNTER — OFFICE VISIT (OUTPATIENT)
Dept: FAMILY MEDICINE CLINIC | Facility: CLINIC | Age: 39
End: 2024-05-24

## 2024-05-24 VITALS
OXYGEN SATURATION: 98 % | BODY MASS INDEX: 44.17 KG/M2 | DIASTOLIC BLOOD PRESSURE: 80 MMHG | HEART RATE: 83 BPM | HEIGHT: 60 IN | TEMPERATURE: 98.4 F | SYSTOLIC BLOOD PRESSURE: 132 MMHG | WEIGHT: 225 LBS

## 2024-05-24 DIAGNOSIS — G89.29 CHRONIC BILATERAL LOW BACK PAIN WITHOUT SCIATICA: ICD-10-CM

## 2024-05-24 DIAGNOSIS — M54.50 CHRONIC BILATERAL LOW BACK PAIN WITHOUT SCIATICA: ICD-10-CM

## 2024-05-24 NOTE — PROGRESS NOTES
Ambulatory Visit  Name: Lucina Barrett      : 1985      MRN: [id number]  Encounter Provider: Abigail Hopkins DO  Encounter Date: 2024   Encounter department: [facility]    Assessment & Plan   1. BMI 40.0-44.9, adult ([facility])  Assessment & Plan:  Meet Exercise goal, interested in expanding her exercise regimen  Overall, she feels like her eating habits have gotten better  New goal: 45-65 minutes workouts.  Consider lifestyle dietician referral- Jon- at follow up      At next visit consider the following questions/goals:  Sleep hygiene  What is standing in the way of becoming more active for you?  Keep a food log  Add beans daily  2. Chronic bilateral low back pain without sciatica  Assessment & Plan:  Mid thoracic back and lumbar back pain, currently stable and improving with exercise    Plan:  Continue OMT   Encourage trying Robaxin for severe flare ups  Try Voltaren gel QID as needed for lumbar, thoracic, and lateral epicondylitis   Discussed the importance of exercises and sleep and how it can help reduce back pain  Follow up weight management         Physical Activity Assessment and Intervention:    Reviewed and updated Physical Activity Prescription with patient      Other interventions: Goal 45-65 minutes workouts.    History of Present Illness     HPI  39 yo female with pmhx of lumbar back pain, migraines, BMI>40, presents to office follow up for lifestyle visit.     Last office visit, goal discussed included doing 20 minutes of exercise per day. Patient reports doing 30-60 minutes of exercise per day about 3-4x per week. Mainly walking and at home exercises.    Reports still doing well with diet. Often skips breakfast, lunch eats Susanne salad bowels with roasted chicken.     New goal: Goal 45-65 minutes workouts.      Review of Systems   Constitutional:  Negative for chills, diaphoresis and fever.   Eyes:  Negative for visual disturbance.   Respiratory:   Negative for shortness of breath and wheezing.    Cardiovascular:  Negative for chest pain, palpitations and leg swelling.   Gastrointestinal:  Negative for abdominal pain, nausea and vomiting.   Musculoskeletal:  Negative for back pain.   Neurological:  Negative for dizziness, light-headedness and headaches.     Medical History Reviewed by provider this encounter:       Current Outpatient Medications on File Prior to Visit   Medication Sig Dispense Refill    Diclofenac Sodium (VOLTAREN) 1 % Apply 2 g topically 4 (four) times a day For pain to affected area 100 g 0    lidocaine (LIDODERM) 5 % APPLY 1 PATCH TOPICALLY OVER 12 HOURS EVERY 24 HOURS REMOVE & DISCARD PATCH WITHIN 12 HOURS OR AS DIRECTED BY MD 15 patch 0    propranolol (INDERAL) 40 mg tablet Start with 40 mg at bed time and then increase the dose to 80 mg in a week's time, if no relief. 90 tablet 3    SUMAtriptan (IMITREX) 50 mg tablet Take 1 tablet (50 mg total) by mouth once as needed for migraine (If it does not work over 2h can take a second tablet) for up to 90 doses 8 tablet 3    methocarbamol (ROBAXIN) 500 mg tablet Take 1 tablet (500 mg total) by mouth 4 (four) times a day for 7 days (Patient not taking: Reported on 2/19/2024) 28 tablet 0     No current facility-administered medications on file prior to visit.      Objective     /80 (BP Location: Right arm, Patient Position: Sitting, Cuff Size: Large)   Pulse 83   Temp 98.4 °F (36.9 °C) (Tympanic)   Ht 5' (1.524 m)   Wt 102 kg (225 lb)   SpO2 98%   BMI 43.94 kg/m²     Physical Exam  Vitals and nursing note reviewed.   Constitutional:       General: She is not in acute distress.     Appearance: She is well-developed.   HENT:      Head: Normocephalic and atraumatic.   Eyes:      Conjunctiva/sclera: Conjunctivae normal.   Cardiovascular:      Rate and Rhythm: Normal rate and regular rhythm.      Heart sounds: No murmur heard.  Pulmonary:      Effort: Pulmonary effort is normal. No  respiratory distress.      Breath sounds: Normal breath sounds.   Abdominal:      Palpations: Abdomen is soft.      Tenderness: There is no abdominal tenderness.   Musculoskeletal:         General: No swelling.      Cervical back: Neck supple.   Skin:     General: Skin is warm and dry.      Capillary Refill: Capillary refill takes less than 2 seconds.   Neurological:      Mental Status: She is alert.   Psychiatric:         Mood and Affect: Mood normal.       Administrative Statements   I have spent a total time of 40 minutes on 06/23/24 In caring for this patient including Risks and benefits of tx options, Instructions for management, Patient and family education, Importance of tx compliance, Counseling / Coordination of care, and Obtaining or reviewing history  .

## 2024-06-24 NOTE — ASSESSMENT & PLAN NOTE
Mid thoracic back and lumbar back pain, currently stable and improving with exercise    Plan:  Continue OMT   Encourage trying Robaxin for severe flare ups  Try Voltaren gel QID as needed for lumbar, thoracic, and lateral epicondylitis   Discussed the importance of exercises and sleep and how it can help reduce back pain  Follow up weight management

## 2024-06-24 NOTE — ASSESSMENT & PLAN NOTE
Meet Exercise goal, interested in expanding her exercise regimen  Overall, she feels like her eating habits have gotten better  New goal: 45-65 minutes workouts.  Consider lifestyle dietician referral- Jon- at follow up      At next visit consider the following questions/goals:  Sleep hygiene  What is standing in the way of becoming more active for you?  Keep a food log  Add beans daily

## 2024-07-11 ENCOUNTER — OFFICE VISIT (OUTPATIENT)
Dept: NEUROLOGY | Facility: CLINIC | Age: 39
End: 2024-07-11
Payer: MEDICARE

## 2024-07-11 VITALS
SYSTOLIC BLOOD PRESSURE: 108 MMHG | OXYGEN SATURATION: 97 % | BODY MASS INDEX: 43.78 KG/M2 | DIASTOLIC BLOOD PRESSURE: 78 MMHG | HEART RATE: 80 BPM | HEIGHT: 60 IN | WEIGHT: 223 LBS

## 2024-07-11 DIAGNOSIS — G43.909 MIGRAINE WITHOUT STATUS MIGRAINOSUS, NOT INTRACTABLE, UNSPECIFIED MIGRAINE TYPE: Primary | ICD-10-CM

## 2024-07-11 PROCEDURE — 99215 OFFICE O/P EST HI 40 MIN: CPT | Performed by: PSYCHIATRY & NEUROLOGY

## 2024-07-11 NOTE — PROGRESS NOTES
NEUROLOGY OUTPATIENT - FOLLOW UP PATIENT VISIT NOTE        NAME: Lucina Barrett  MRN: [id number]  : 1985     TODAY'S DATE: 24         HISTORY OF PRESENT ILLNESS (HPI):    Ms. Barrett is a 38 y.o. female presenting with Migraine        INTERIM HISTORY:  Frequency of headaches days : Improved since last clinic visit. 3-5 headache per month. April was a bad month, but March was a good month     Intensity of the headaches days: same     Medicine for headaches: Propranolol 80 milligrams daily and Imitrex (50 mg prescribed but she has been breaking them into 25 mg to help with her headaches)    Side effects from the medications.none she feels that the 25 mg of Imitrex works a little bit longer as compared with 50 mg--    Failed medications: None     Any imaging including CTH or MRI of the brain: no new imaging done in the system    Sleep medicine consult is scheduled for  NOTES:      HEADACHE DESCRIPTION:    Onset of headaches: gradually about 7-8 years back  Location of headaches: right-sided unilateral, occipital, temporal, parietal, and retro-orbital  Radiation: Yes   Quality of the pain: sharp and shooting  Intensity of the headache: At present: 0/10;  At its worst:  10/10  Duration of the headaches:day(s) (2-9 days)  Frequency of the headaches:about 2-9 days per month  Presence of aura:  Yes, some tension behind the eye  Associated symptoms with headaches: no nausea , no vomiting , +light sensitivity , and +sound sensitivity  Triggers:No   Positional component: Yes, some throbbing with bending down   Alleviating factors: Yes, Imitrex sometimes helps.    Any specific time of the day when get the headaches: no particular time of day    Family history of migraine headaches: No  History of neck and head trauma: No  Smoking status: No  Oral contraceptive use: No    Life style factors:  -Hydration: adequate  -Sleep: adequate  -Caffeine intake: none  -Alcohol intake: none     Impact of  headches:  -Number of headaches in past 30 days: 10/30 days.   -Number of days missed per month because of headache: none in the last 30 days.   -Number of ER visits for her headaches: none  in the last 30 days.       Treatment:  -Over the counter medications tried for headaches:   Naproxen been helpful     -Prescription medications:  Abortive or Rescue Medications used:   Sumatriptan been helpful     Preventative or daily medications used for headaches:   Topamax (did not helped).       Other significant co morbidities:  Fatigue (she is tired even when waking up, doze off fast). She typically works around 8 hours.       Red Flags for headache:  Age <5 or >50: No  First worst headaches: No  Maximal at intensity: No  Change in pattern: Yes  New headache in pregnancy, cancer or immunocompromised patient: No  Loss of consciousness or convulsions: No  Headache triggered by exertion, Valsalva maneuver or sexual activity: No  Abnormal exam: please see below in Neurological examination.        CURRENT OUTPATIENT MEDICATIONS:    Lucina Barrett has a current medication list which includes the following prescription(s): diclofenac sodium, lidocaine, propranolol, sumatriptan, and methocarbamol.       PHYSICAL EXAMINATION:   VITAL SIGNS:  height is 5' (1.524 m) and weight is 101 kg (223 lb). Her blood pressure is 108/78 and her pulse is 80. Her oxygen saturation is 97%.        NEUROLOGICAL EXAMINATION:    MENTAL STATUS EXAM: Alert, oriented to time place and person     CRANIAL NERVE EXAMINATION:  I Not tested   II Normal visual fields to finger counting.   II, Ill,  IV, VI Pupils were symmetric, briskly reactive. No afferent pupillary defect.  Eye movements are full without nystagmus. No ptosis.   V Facial sensation were intact   VII Facial movements were symmetrical    VIII Hearing was intact   IX, X Intact   XI Shoulder shrug and head turn was normal   XII Tongue protrusion is in the mid line.          MOTOR EXAM:    "     Arm Right  Left Leg Right  Left   Deltoid 5/5 5/5 lliopsoas 5/5 5/5   Biceps 5/5  5/5 Quads 5/5 5/5   Triceps 5/5 5/5 Hamstrings 5/5 5/5   Wrist Extension 5/5 5/5 Ankle Dorsi Flexion 5/5 5/5   Wrist Flexion 5/5 5/5 Ankle Plantar Flexion 5/5 5/5               DEEP TENDON REFLEXES:     Brachioradialis Biceps Triceps Patellar Achilles   Right 2+ 2+ 2+ 2+ 1+   Left 2+ 2+ 2+ 2+ 1+            SENSORY EXAMINATION:   Sensation to dull touch Intact  Sensation to temperature Intact    COORDINATION:   Normal finger to nose testing  Finger tapping test was normal    GAIT/STATION:   normal        DIAGNOSTIC STUDIES:   PERTINENT LABS:     Lab Results   Component Value Date    WBC 8.52 11/15/2022     Lab Results   Component Value Date    HGB 10.6 (L) 11/15/2022     Lab Results   Component Value Date     11/15/2022     Lab Results   Component Value Date    LYMPHSABS 1.64 11/15/2022     No results found for: \"LABLYMP\"  Lab Results   Component Value Date    EOSABS 0.06 11/15/2022     No components found for: \"NEUTROPHILS\"    No results found for: \"LABMONO\"         No results found for: \"LABGLUC\"  Lab Results   Component Value Date    CREATININE 0.55 (L) 07/22/2023     Lab Results   Component Value Date    AST 14 11/15/2022     Lab Results   Component Value Date    ALT 9 11/15/2022     Lab Results   Component Value Date    ALKPHOS 114 (H) 11/15/2022     Lab Results   Component Value Date    AST 14 11/15/2022          No components found for: \"FLOWCYTEVAL\"     Lab Results   Component Value Date    HEPBSAG Non-reactive 05/18/2022    HEPCAB Non-reactive 07/22/2023            NEUROIMAGING:     No new neuroimaging done in the system.      ASSESSMENT AND PLAN:    Ms. Barrett is a 38 y.o.female  presenting with migraine headaches follow up that initially started about 7-8 years back but most recently have been not improving.   Neurological examination is nonfocal and there are no red flags which would warrant any further imaging " she was started on propranolol 80 mg along with Imitrex 50 mg which seems to be helping with her headaches as thefrequency of the headaches have decreased significantly    Migraine without status migrainosus, not intractable, unspecified migraine type  Seems to be improving.     Prophylactic medication: Continue with Propranolol 80 mg daily. Side effects were discussed in detail.     Rescue medications.  Decrease the dose of Imitrex to 25 mg daily on as needed basis as a rescue medicine for your migraine headaches. Please take the medicine on the first sign of your migraine headaches.  You can repeat the dose after 2 hours but no more than 3 tabs in 24 hours.Side effects were discussed in detail.       Healthy lifestyle modifications were also discussed with the patient and written instructions were given.     Magnesium 400 mg daily along with riboflavin 400 mg can also help with headache control         Return in about 5 months (around 12/11/2024), or Roly.       The management plan was discussed in detail with the patient and all questions were answered.     Ms. aBrrett was encouraged to contact our office with any questions or concerns and to contact the clinic or go to the nearest emergency room if symptoms change or worsen.       I have spent a total of 41 min in reviewing and/or ordering tests, medications, or procedures, performing an examination or evaluation, reviewing pertinent history, counseling and educating the patient, referring and/or communicating with other health care professionals, documenting in the EMR and general coordination of care of Ms. Barrett  today.           Rogelio Hayes MD.   Staff Neurologist,   Neuroimmunology and Neuroinfectious disease  07/11/24     This report has been created through the use of voice recognition/text compilation software.  Typographical and content errors may occur with this process.  While efforts are made to detect and correct such errors, in some  cases errors will persist.  For this reason, wording in this document should be considered in the proper context and not strictly verbatim.  If, when reviewing the document, an error is discovered, please let the office know at [phone]

## 2024-07-11 NOTE — PATIENT INSTRUCTIONS
Topamax/Topiramate is an oral pill which is used for migraine headaches: We typical start it at 25 mg and then increase it slowly over the course of several weeks. Can cause following side effects including numbness, tingling, weight loss, no recommended if you are trying to get pregnant, specific kidney stone issues.     Elavil/Pamelor is an oral pill. It is used for migraine and tension type headaches. We typical start it at a low dose of 10 mg and increase it to 20 mg. Common side effects include Fatigue-Dry mouth-Weight gain-Constipation- Drowsiness. It can also help with mood.     Propranolol/Beta Blockers is an oral pill.  It is started around 40 mg every day and increased accordingly. Common side effects not used if you have underlying asthma. Can cause low pulse and blood pressure.     Depakote is a pill which is used for migraine headaches and seizures. Usually once or twice a day. Common side effects include the following Nausea--Tiredness--Tremor--Dizziness--Weight gain--Hair loss--Birth defects    QULIPTA is an oral prescription medicine used for the preventive treatment of migraine in adults-Common side effects include, allergic reaction. If you have any kidney problems, liver problems, or plan to get pregnant than it is not recommended.       Ajovy is an injection, which is given once a month under you skin: AJOVY may cause allergic reactions, including itching, rash, and hives that can happen within hours and up to 1 month after receiving AJOVY. Call your healthcare provider or get emergency medical help right away if you have any symptoms of an allergic reaction: swelling of your face, mouth, tongue, throat, or if you have trouble breathing. Talk to your doctor about stopping AJOVY if you have an allergic reaction.    Aimovig is an injection, which is given once a month under you skin. The common side effects include Allergic reactions, including rash or swelling can happen after receiving Aimovig®.  This can happen within hours to days after using Aimovig®. Call your HCP or get emergency medical help right away if you have any of the following symptoms of an allergic reaction: swelling of the face, mouth, tongue or throat, or trouble breathing. Constipation with serious complications. Severe constipation can happen after receiving Aimovig®. In some cases people have been hospitalized or needed surgery. Contact your HCP if you have severe constipation or constipation associated with symptoms such as severe or constant belly pain, vomiting, swelling of belly or bloating.High blood pressure. High blood pressure or worsening of high blood pressure can happen after receiving Aimovig®. Contact your healthcare provider if you have an increase in blood pressure.    Emgality is an injection, which is given once a month under you skin. The initial does is 2 injections and then it is one injection a month there after. Common side effects include allergic reactions, such as itching, rash, hives, and trouble breathing. Allergic reactions can happen days after using Emgality. Call your healthcare provider or get emergency medical help right away if you have any of the following symptoms, which may be part of an allergic reaction: swelling of your face, mouth, tongue, or throat, or trouble breathing. Common side effects--The most common side effects of Emgality are injection site reactions.            Lifestyle adjustments. Irrespective of treatment modalities applied, trigger control and lifestyle modification are indispensable to the successful management of migraine. This is especially important in children, adolescents, or pregnant women where drug treatments must be especially limited.    Although there is no robust evidence for most of the recommendations, most are general health measures that, given the lack of adverse effects and the benefit for general well-being, we consider should be recommended in all  patients.    Avoid triggers. Many patients attribute the onset or worsening of pain to specific triggers such as stress, sleep changes, food, or atmospheric changes, among others. It is even possible that the relationship occurs inversely, so that premonitory symptoms such as sleep disturbances and appetite 48 to 72 hours before the onset of pain can be misinterpreted by the patient as the trigger of migraine attacks. The therapeutic implications of this relationship are also unclear. There are possible triggers such as sleep deprivation, fasting, or certain foods that can be easily avoidable. But avoiding other triggers can lead to very restrictive lifestyles with a reduction in quality of life that does not outweigh the potential beneficial.    Sleep. Another complex relationship is headache and sleep. An excess or lack of sleep can trigger migraine attacks and at the same time rest is one of the most used treatments to improve the symptoms of the migraine attack. Additionally, migraine and other headaches occur comorbidly with sleep disorders. Patients with chronic migraine have a higher prevalence of sleep disorders, specifically poor sleep habits and nonrestorative rest.    Regarding general sleep measures for patients with headache, it is recommended to define regular sleep schedules that allow 8 hours of rest per day, insisting that they remain constant also during the weekend; have dinner 4 hours before bedtime and avoid liquids in the last two hours; and eliminate naps and avoid using screens, television, reading, or listening to music in bed. A nonpharmacological intervention to improve sleep habits can improve headache frequency and even reverse chronic to episodic migraine.    Diet. In the scientific literature, but especially in the informative websites and magazines, multiple and varied diets are proposed that aim to reduce the frequency of headaches. There are two main approaches: elimination diets,  which consist of suppressing potentially triggering foods such as chocolate, alcohol, cheese, nuts, or citrus fruits and diets that provide high or low amounts of certain components, ie, rich in vitamin B12, B6, or D or low in histamine, lactose, or fatty acids. The studies are not very rigorous and most do not have a control group (). In addition, it must be considered that food triggers were only associated with onset of headache in less than 10% of the participants.    Dietary recommendations for patients with migraine should be the same as for the general population with special emphasis on the prevention of obesity, which is a factor related to headache chronification. It is recommendable to have a varied diet, eating five meals a day to avoid periods of prolonged fasting and incorporating water intake to reach around 2.5 liters per day, which should be increased in case of physical activity or increase in temperature or humidity. Specific diets should be recommended solely based on whether there are other comorbidities in the patient.    Caffeine at moderate doses (< 400mg/day: equivalent to two cups of coffee) does not seem to have a negative effect on headache frequency although it should be taken regularly to avoid withdrawal headaches.    Although patients with migraine headaches and cluster headaches may be more susceptible to alcohol as a precipitant, there is no evidence to recommend abstinence from alcohol in all patients. Individual predisposition and cultural factors must be considered.    Exercise. Aerobic exercise can prevent or reduce symptoms of multiple chronic diseases, including headache. With some methodological limitations, there are studies that demonstrate benefits of aerobic exercise as a therapeutic intervention to reduce the frequency and intensity of headaches, as well as the quality of life measured by questionnaires. Exercise can have a beneficial effect on headaches directly but  also indirectly, improving sleep quality, mood, cardiovascular function, and preventing weight gain. In addition, it can improve the control of other diseases frequently comorbid with headache such as obesity, hypertension, anxiety, depression, or sleep disorders (). The clinical benefit of yoga as an add-on therapy in patients with episodic migraine has been demonstrated.               Vitamins for headache  Mg 400 mg daily for headache  RiboFlavin 400 mg daily for headache

## 2024-08-21 DIAGNOSIS — G43.909 MIGRAINE WITHOUT STATUS MIGRAINOSUS, NOT INTRACTABLE, UNSPECIFIED MIGRAINE TYPE: ICD-10-CM

## 2024-08-21 RX ORDER — PROPRANOLOL HYDROCHLORIDE 40 MG/1
TABLET ORAL
Qty: 90 TABLET | Refills: 3 | Status: SHIPPED | OUTPATIENT
Start: 2024-08-21

## 2024-08-22 ENCOUNTER — OFFICE VISIT (OUTPATIENT)
Age: 39
End: 2024-08-22
Payer: MEDICARE

## 2024-08-22 VITALS
HEART RATE: 80 BPM | TEMPERATURE: 97.9 F | BODY MASS INDEX: 43.51 KG/M2 | DIASTOLIC BLOOD PRESSURE: 70 MMHG | SYSTOLIC BLOOD PRESSURE: 108 MMHG | HEIGHT: 60 IN | WEIGHT: 221.6 LBS

## 2024-08-22 DIAGNOSIS — E66.01 CLASS 3 SEVERE OBESITY DUE TO EXCESS CALORIES WITHOUT SERIOUS COMORBIDITY WITH BODY MASS INDEX (BMI) OF 40.0 TO 44.9 IN ADULT (HCC): Primary | ICD-10-CM

## 2024-08-22 PROCEDURE — 99244 OFF/OP CNSLTJ NEW/EST MOD 40: CPT | Performed by: PHYSICIAN ASSISTANT

## 2024-08-22 NOTE — PROGRESS NOTES
Assessment/Plan:  Lucina was seen today for consult.    Diagnoses and all orders for this visit:    Class 3 severe obesity due to excess calories without serious comorbidity with body mass index (BMI) of 40.0 to 44.9 in adult (HCC)  -     Ambulatory Referral to Weight Management         - Discussed options of HealthyCORE-Intensive Lifestyle Intervention Program, Very Low Calorie Diet-VLCD, Conservative Program, Ketan-En-Y Gastric Bypass, and Vertical Sleeve Gastrectomy and the role of weight loss medications.  - Explained the importance of making lifestyle changes first before starting anti-obesity medications.  - Patient should demonstrate lifestyle changes first before anti-obesity medication initiated.   - Patient is interested in pursuing Conservative Program  - Initial weight loss goal of 5-10% weight loss for improved health as studies have shown this is where we see the greatest impact on improving health and decreasing risk of obesity related conditions.  - Weight loss can improve patient's co-morbid conditions and/or prevent weight-related complications.  - Stop Bang 4/8 - sleep study scheduled in November   - Labs reviewed: As below.      General Recommendations:  Nutrition:  Eat breakfast daily.  Do not skip meals.     Food log (ie.) www.FREEjit.com, sparkpeople.com, loseit.com, calorieking.com, etc.    Practice mindful eating.  Be sure to set aside time to eat, eat slowly, and savor your food.    Hydration:    At least 64oz of water daily.  No sugar sweetened beverages.  No juice (eat the fruit instead).    Exercise:  Studies have shown that the ideal exercise goal is somewhere between 150 to 300 minutes of moderate intensity exercise a week.  Start with exercising 10 minutes every other day and gradually increase physical activity with a goal of at least 150 minutes of moderate intensity exercise a week, divided over at least 3 days a week.  An example of this would be exercising 30 minutes a day,  5 days a week.  Resistance training can increase muscle mass and increase our resting metabolic rate.   FULL BODY resistance training is recommended 2-3 times a week.  Do not do this on consecutive days to allow for muscle recovery.    Aim for a bare minimum 5000 steps, even on days you do not exercise.    Monitoring:   Weigh yourself daily.  If this causes undue stress, then just weigh yourself once a week.  Weigh yourself the same time of the day with the same amount of clothing on.  Preferably this should be done after waking up, before you eat, and with no clothing or minimal clothing on.    Specific Goals:  No sugary beverages. At least 64oz of water daily.  Gradually increase physical activity to a goal of 5 days per week for 30 minutes of MODERATE intensity PLUS 2 days per week of FULL BODY resistance training  Goal protein intake of 60-80 grams per day    Calorie goal:  [id number] edil/day (Provided with meal plan to follow).    Return visit:    Calorie deficit/tracking   Physical activity   Get blood work done  AOM  Contraception: s/p b/l salpingectomy  Avoid topamax d/t hx of nephrolithiasis  Discussed contrave, phentermine, and GLP1 RA  Will hold on pharmacotherapy at this time  RTC: 3 month weight check. If not having success with conservative efforts, can pursue contrave, phentermine, or GLP1 RA. She will check with insurance for GLP1 RA coverage. Provider follow up 6 month    Total time spent reviewing chart, interviewing patient, examining patient, discussing plan, answering all questions, and documentin min.       ______________________________________________________________________        Subjective:   Chief Complaint   Patient presents with    Consult     MWM CONSULT       HPI: Lucina Barrett  is a 39 y.o. female with history of migraines, nephrolithiasis,  and excess weight, here to pursue weight loss management.  Previous notes and records have been reviewed.    She reports exercise  "is markedly restricted due to hip and back pain. She has undergone physical therapy and reports ongoing workup/treatment for.     TSH WNL  Hemoglobin A1c 5.3    Migraines - propranolol, sumatriptan  - previously took topamax for migraines, d/c due to not working for migraine suppression.       Weight became an issue after gallbladder removed (2019) and after 5th child ()  Pre 2019, 145 lbs        HPI  Wt Readings from Last 20 Encounters:   24 101 kg (221 lb 9.6 oz)   24 101 kg (223 lb)   24 102 kg (225 lb)   04/15/24 101 kg (223 lb)   24 100 kg (220 lb 12.8 oz)   24 101 kg (222 lb)   24 99.7 kg (219 lb 12.8 oz)   23 101 kg (222 lb 9.6 oz)   10/16/23 99.6 kg (219 lb 9.6 oz)   23 101 kg (223 lb 9.6 oz)   23 102 kg (225 lb 3.2 oz)   23 102 kg (224 lb)   23 101 kg (222 lb 6.4 oz)   23 97.1 kg (214 lb)   23 93 kg (205 lb)   23 95.3 kg (210 lb)   22 94.3 kg (208 lb)   11/15/22 100 kg (221 lb)   22 100 kg (221 lb)   22 99.5 kg (219 lb 6.4 oz)     Excess Weight:  Highest weight: 280 lbs \"years ago while on depo\"  Lowest Weight: 145 lbs  Current weight: 221 lbs  What has been tried: Diet and Exercise      Food logging:  B: skip  S: skip  L: free shorties at work form [employer] - tuna + hard boiled egg. Occassional quesadilla   S: skip  D: baked chicken  + rice/potato + veggies (\"everything but mushroom\")   S: occasional - snack pack of muffins or some chips, ice cream mini cones      Hydration: Water - 100 oz    Seven up - 1 can  Alcohol: none  Smoking: none  Exercise: Restricted d/t hip and back pain  Sleep:  \"Falls asleep easily, but wakes up multiple times\" 5 hours   Occupation: Works at [employer]       Past Medical History:   Diagnosis Date    Fetal macrosomia 2022    Gallbladder attack     History of  delivery, currently pregnant 2022    3 spontaneous  deliveries, earliest at 22 weeks 35 weeks  S/p " cervical length surveillance through [facility]    Kidney stones     Migraines     Varicella     Von Willebrand disease (HCC)     patient is carrier     Patient denies personal and family history of  pancreatitis, thyroid cancer, MEN-2 tumors.  Denies any hx of glaucoma, seizures, + kidney stones, s/p cholecystectomy .  Denies Hx of CAD, PAD, palpitations, arrhythmia.   Denies uncontrolled anxiety or depression, suicidal behavior or thinking , insomnia or sleep disturbance.       Past Surgical History:   Procedure Laterality Date    EXAMINATION UNDER ANESTHESIA N/A 1/31/2023    Procedure: EXAM UNDER ANESTHESIA (EUA);  Surgeon: Sun Ayala MD;  [facility];  Service: Gynecology    LAPAROSCOPIC CHOLECYSTECTOMY      LITHOTRIPSY      WI LAPAROSCOPY W/RMVL ADNEXAL STRUCTURES Bilateral 1/31/2023    Procedure: SALPINGECTOMY, LAPAROSCOPIC, Examine under Anesthesia;  Surgeon: Sun Ayala MD;  Location[facility];  Service: Gynecology    TONSILLECTOMY      TONSILLECTOMY       The following portions of the patient's history were reviewed and updated as appropriate: allergies, current medications, past family history, past medical history, past social history, past surgical history, and problem list.    Review Of Systems:  Review of Systems   Constitutional:  Negative for fatigue and fever.   HENT:  Positive for trouble swallowing (feels a globus sensation on occassion). Negative for sore throat and voice change.    Respiratory:  Negative for shortness of breath.    Cardiovascular:  Negative for chest pain and palpitations.   Gastrointestinal:  Negative for abdominal pain, constipation, diarrhea, nausea and vomiting.   Endocrine: Negative for cold intolerance and heat intolerance.   Genitourinary:  Negative for difficulty urinating.   Musculoskeletal:  Positive for arthralgias, back pain and myalgias.   Psychiatric/Behavioral:  Negative for suicidal ideas. The patient is not nervous/anxious.    All other  "systems reviewed and are negative.      Objective:  /70   Pulse 80   Temp 97.9 °F (36.6 °C) (Tympanic)   Ht 5' 0.24\" (1.53 m)   Wt 101 kg (221 lb 9.6 oz)   BMI 42.94 kg/m²   Physical Exam  Vitals and nursing note reviewed. Exam conducted with a chaperone present (present with partner).   Constitutional:       General: She is not in acute distress.     Appearance: Normal appearance. She is obese. She is not ill-appearing, toxic-appearing or diaphoretic.   HENT:      Head: Normocephalic and atraumatic.   Eyes:      General:         Right eye: No discharge.         Left eye: No discharge.      Conjunctiva/sclera: Conjunctivae normal.   Pulmonary:      Effort: Pulmonary effort is normal. No respiratory distress.   Musculoskeletal:         General: Normal range of motion.      Cervical back: Normal range of motion. No rigidity.      Right lower leg: No edema.      Left lower leg: No edema.   Skin:     Coloration: Skin is not pale.      Findings: No erythema or rash.   Neurological:      General: No focal deficit present.      Mental Status: She is alert.   Psychiatric:         Mood and Affect: Mood normal.         Labs and Imaging  Recent labs and imaging have been personally reviewed.  Lab Results   Component Value Date    WBC 8.52 11/15/2022    HGB 10.6 (L) 11/15/2022    HCT 32.4 (L) 11/15/2022    MCV 92 11/15/2022     11/15/2022     Lab Results   Component Value Date    SODIUM 138 07/22/2023    K 3.8 07/22/2023     07/22/2023    CO2 26 07/22/2023    AGAP 8 07/22/2023    BUN 12 07/22/2023    CREATININE 0.55 (L) 07/22/2023    GLUC 83 11/15/2022    GLUF 103 (H) 07/22/2023    CALCIUM 9.2 07/22/2023    AST 14 11/15/2022    ALT 9 11/15/2022    ALKPHOS 114 (H) 11/15/2022    TP 6.8 11/15/2022    TBILI 0.44 11/15/2022    EGFR 119 07/22/2023     Lab Results   Component Value Date    HGBA1C 5.3 11/15/2022     Lab Results   Component Value Date    JOU3GVEWGIWE 0.846 07/22/2023     Lab Results   Component " Value Date    CHOLESTEROL 143 07/22/2023     Lab Results   Component Value Date    HDL 37 (L) 07/22/2023     Lab Results   Component Value Date    TRIG 69 07/22/2023     Lab Results   Component Value Date    LDLCALC 92 07/22/2023

## 2024-09-17 ENCOUNTER — OFFICE VISIT (OUTPATIENT)
Dept: FAMILY MEDICINE CLINIC | Facility: CLINIC | Age: 39
End: 2024-09-17
Payer: MEDICARE

## 2024-09-17 VITALS
HEART RATE: 82 BPM | DIASTOLIC BLOOD PRESSURE: 69 MMHG | OXYGEN SATURATION: 96 % | BODY MASS INDEX: 43.13 KG/M2 | SYSTOLIC BLOOD PRESSURE: 115 MMHG | WEIGHT: 222.6 LBS | TEMPERATURE: 97.9 F

## 2024-09-17 DIAGNOSIS — R05.1 ACUTE COUGH: ICD-10-CM

## 2024-09-17 DIAGNOSIS — G89.29 CHRONIC BILATERAL LOW BACK PAIN WITHOUT SCIATICA: Primary | ICD-10-CM

## 2024-09-17 DIAGNOSIS — M25.552 LEFT HIP PAIN: ICD-10-CM

## 2024-09-17 DIAGNOSIS — M54.50 CHRONIC BILATERAL LOW BACK PAIN WITHOUT SCIATICA: Primary | ICD-10-CM

## 2024-09-17 PROCEDURE — 99213 OFFICE O/P EST LOW 20 MIN: CPT

## 2024-09-17 NOTE — PROGRESS NOTES
"Ambulatory Visit  Name: Lucina Barrett      : 1985      MRN: [id number]  Encounter Provider: Abigail Hopkins DO  Encounter Date: 2024   Encounter department: [facility]    Assessment & Plan  Chronic bilateral low back pain without sciatica  Unchanged, intermittent back  with new left hip pain. Suspect left hip pain is related to chronic back pain. However, did have postive \"C-sign\". Patient declines oral medications including NSAIDs and tylenol. Using Lidocaine patches prn. No red flash or trauma.    Plan:  Recommend Voltaren gel  Recommend PT- patient will consider restarting PT  F/u left hip Xray to assess osteoarthritis  F/u Weight management  F/u Sleep study in November  F/u 7 months for annual physical-discussed obtaining fasting labs prior to office visit (Lipid, BMP, TSH)    Orders:    XR hip/pelv 2-3 vws left if performed; Future    Ambulatory Referral to Physical Therapy; Future    Left hip pain    Orders:    XR hip/pelv 2-3 vws left if performed; Future    Ambulatory Referral to Physical Therapy; Future    Acute cough  5 days of symptoms.  daugher sick at home. No fevers. Most likely viral. Discussed symptoms control. No red flags, SOB, or chest pain. No fevers. Discussed using zyrtec and Flonase for symptom control. Return precautions discussed.           History of Present Illness     HPI  38 yo female with pmhx of lumbar back pain, migraines, BMI>40, presents to office follow up for chronic conditions.    Has non-productive cough since Friday. Denies rhinnorhea, sore throat, chills, fevers, congestion, no muscle aches, diarrhea, abdominal pain, or rashes, nausea, vomiting.   Tried Nightquil, Robitussin, cough-drop.   No covid exposure.  Baby has runny nose, goes to .     Reports left hip pain is worse. Not taking Robaxin. Uses lidocaine patches on back which does help. Doin home stretches. Hip pain worse when back is flared up. "     Migraines- has one now.   Last Migraine prior to this one was Mid August. Can go a couple of weeks without taking Sumatriptan 25mg. Migraines usually last 3 days,  - and  to . Taking Propanolol 40 mg daily. Possibly related to menstrual cycles.    She went to weight management on . Weight management wanted her to try their meal replacement drinks but it is making her nauseous due to milk products. Sleep study scheduled in November.     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     History obtained from : patient  Review of Systems  Past Medical History   Past Medical History:   Diagnosis Date    Fetal macrosomia 2022    Gallbladder attack     History of  delivery, currently pregnant 2022    3 spontaneous  deliveries, earliest at 22 weeks 35 weeks  S/p cervical length surveillance through [facility]    Kidney stones     Migraines     Varicella     Von Willebrand disease (HCC)     patient is carrier     Past Surgical History:   Procedure Laterality Date    EXAMINATION UNDER ANESTHESIA N/A 2023    Procedure: EXAM UNDER ANESTHESIA (EUA);  Surgeon: Sun Ayala MD;  [facility];  Service: Gynecology    LAPAROSCOPIC CHOLECYSTECTOMY      LITHOTRIPSY      PA LAPAROSCOPY W/RMVL ADNEXAL STRUCTURES Bilateral 2023    Procedure: SALPINGECTOMY, LAPAROSCOPIC, Examine under Anesthesia;  Surgeon: Sun Ayala MD;  [facility];  Service: Gynecology    TONSILLECTOMY      TONSILLECTOMY       Family History   Problem Relation Age of Onset    No Known Problems Mother     No Known Problems Father     No Known Problems Sister     No Known Problems Brother     No Known Problems Daughter     No Known Problems Son     No Known Problems Maternal Grandfather     No Known Problems Sister     No Known Problems Sister     No Known Problems Daughter      Current Outpatient Medications on File Prior to Visit   Medication Sig Dispense Refill     Diclofenac Sodium (VOLTAREN) 1 % Apply 2 g topically 4 (four) times a day For pain to affected area 100 g 0    lidocaine (LIDODERM) 5 % APPLY 1 PATCH TOPICALLY OVER 12 HOURS EVERY 24 HOURS REMOVE & DISCARD PATCH WITHIN 12 HOURS OR AS DIRECTED BY MD 15 patch 0    propranolol (INDERAL) 40 mg tablet START WITH 1 TABLET BY MOUTH AT BED TIME. INCREASE THE DOSE TO 2 TABLETS IN 1 WEEK, IF NO RELIEF. 90 tablet 3    SUMAtriptan (IMITREX) 50 mg tablet Take 1 tablet (50 mg total) by mouth once as needed for migraine (If it does not work over 2h can take a second tablet) for up to 90 doses 8 tablet 3    methocarbamol (ROBAXIN) 500 mg tablet Take 1 tablet (500 mg total) by mouth 4 (four) times a day for 7 days (Patient not taking: Reported on 2/19/2024) 28 tablet 0     No current facility-administered medications on file prior to visit.     Allergies   Allergen Reactions    Latex Anaphylaxis, Hives and Anxiety     Category: Allergy; Annotation - [id number]: HIVES      Current Outpatient Medications on File Prior to Visit   Medication Sig Dispense Refill    Diclofenac Sodium (VOLTAREN) 1 % Apply 2 g topically 4 (four) times a day For pain to affected area 100 g 0    lidocaine (LIDODERM) 5 % APPLY 1 PATCH TOPICALLY OVER 12 HOURS EVERY 24 HOURS REMOVE & DISCARD PATCH WITHIN 12 HOURS OR AS DIRECTED BY MD 15 patch 0    propranolol (INDERAL) 40 mg tablet START WITH 1 TABLET BY MOUTH AT BED TIME. INCREASE THE DOSE TO 2 TABLETS IN 1 WEEK, IF NO RELIEF. 90 tablet 3    SUMAtriptan (IMITREX) 50 mg tablet Take 1 tablet (50 mg total) by mouth once as needed for migraine (If it does not work over 2h can take a second tablet) for up to 90 doses 8 tablet 3    methocarbamol (ROBAXIN) 500 mg tablet Take 1 tablet (500 mg total) by mouth 4 (four) times a day for 7 days (Patient not taking: Reported on 2/19/2024) 28 tablet 0     No current facility-administered medications on file prior to visit.      Social History     Tobacco Use    Smoking status:  Never    Smokeless tobacco: Never   Vaping Use    Vaping status: Never Used   Substance and Sexual Activity    Alcohol use: No    Drug use: No    Sexual activity: Yes     Partners: Male     Birth control/protection: None         Objective     /69 (BP Location: Left arm, Patient Position: Sitting, Cuff Size: Large)   Pulse 82   Temp 97.9 °F (36.6 °C) (Tympanic)   Wt 101 kg (222 lb 9.6 oz)   SpO2 96%   BMI 43.13 kg/m²     Physical Exam  Vitals and nursing note reviewed.   Constitutional:       General: She is not in acute distress.     Appearance: She is well-developed. She is not ill-appearing.   HENT:      Head: Normocephalic and atraumatic.      Right Ear: Tympanic membrane, ear canal and external ear normal.      Left Ear: Tympanic membrane, ear canal and external ear normal.      Ears:      Comments: B/l serous effusions     Nose: Rhinorrhea present. No congestion.      Mouth/Throat:      Mouth: Mucous membranes are moist.      Pharynx: Oropharynx is clear. Posterior oropharyngeal erythema present. No oropharyngeal exudate.   Eyes:      Conjunctiva/sclera: Conjunctivae normal.   Cardiovascular:      Rate and Rhythm: Normal rate and regular rhythm.      Heart sounds: No murmur heard.  Pulmonary:      Effort: Pulmonary effort is normal. No respiratory distress.      Breath sounds: Normal breath sounds. No stridor. No wheezing, rhonchi or rales.   Abdominal:      General: There is no distension.      Palpations: Abdomen is soft.      Tenderness: There is no abdominal tenderness. There is no guarding.   Musculoskeletal:         General: No swelling.      Cervical back: Neck supple.      Right hip: No tenderness or bony tenderness.      Left hip: No tenderness.      Comments: Pain elicited in left with adduction in hip joint  Pain elicited in back with left leg internal rotation  Negative MONIKA  Negative FADIR  Full ROM on hip bilaterally   Skin:     General: Skin is warm and dry.      Capillary Refill:  Capillary refill takes less than 2 seconds.   Neurological:      Mental Status: She is alert.   Psychiatric:         Mood and Affect: Mood normal.

## 2024-09-17 NOTE — ASSESSMENT & PLAN NOTE
"Unchanged, intermittent back  with new left hip pain. Suspect left hip pain is related to chronic back pain. However, did have postive \"C-sign\". Patient declines oral medications including NSAIDs and tylenol. Using Lidocaine patches prn. No red flash or trauma.    Plan:  Recommend Voltaren gel  Recommend PT- patient will consider restarting PT  F/u left hip Xray to assess osteoarthritis  F/u Weight management  F/u Sleep study in November  F/u 7 months for annual physical-discussed obtaining fasting labs prior to office visit (Lipid, BMP, TSH)    Orders:    XR hip/pelv 2-3 vws left if performed; Future    Ambulatory Referral to Physical Therapy; Future    "

## 2024-10-11 DIAGNOSIS — G43.909 MIGRAINE WITHOUT STATUS MIGRAINOSUS, NOT INTRACTABLE, UNSPECIFIED MIGRAINE TYPE: ICD-10-CM

## 2024-10-11 NOTE — TELEPHONE ENCOUNTER
Reason for call:  Dr.Muhammad Adán Hayes,  (Neurology) manage this medication!           [x] Refill   [] Prior Auth  [] Other:     Office:   [] PCP/Provider -   [x] Specialty/Provider -     Medication: SUMAtriptan (IMITREX) 50 mg tablet     Dose/Frequency: Take 1 tablet (50 mg total) by mouth once as needed for migraine (If it does not work over 2h can take a second tablet) for up to 90 doses,     Quantity: 8 tablet     Pharmacy: [facility]/pharmacy #[id number] [facility] [address] [phone]     Does the patient have enough for 3 days?   [x] Yes   [] No - Send as HP to POD

## 2024-10-16 RX ORDER — SUMATRIPTAN 50 MG/1
50 TABLET, FILM COATED ORAL ONCE AS NEEDED
Qty: 8 TABLET | Refills: 5 | Status: SHIPPED | OUTPATIENT
Start: 2024-10-16

## 2024-11-07 ENCOUNTER — HOSPITAL ENCOUNTER (OUTPATIENT)
Dept: SLEEP CENTER | Facility: CLINIC | Age: 39
Discharge: HOME | End: 2024-11-07
Payer: MEDICARE

## 2024-11-07 DIAGNOSIS — G47.33 OSA (OBSTRUCTIVE SLEEP APNEA): ICD-10-CM

## 2024-11-07 DIAGNOSIS — G43.909 MIGRAINE WITHOUT STATUS MIGRAINOSUS, NOT INTRACTABLE, UNSPECIFIED MIGRAINE TYPE: ICD-10-CM

## 2024-11-07 DIAGNOSIS — R53.83 FATIGUE, UNSPECIFIED TYPE: ICD-10-CM

## 2024-11-07 PROCEDURE — 95810 POLYSOM 6/> YRS 4/> PARAM: CPT

## 2024-11-07 PROCEDURE — 95810 POLYSOM 6/> YRS 4/> PARAM: CPT | Performed by: STUDENT IN AN ORGANIZED HEALTH CARE EDUCATION/TRAINING PROGRAM

## 2024-11-08 PROBLEM — G47.33 OSA (OBSTRUCTIVE SLEEP APNEA): Status: ACTIVE | Noted: 2024-11-08

## 2024-11-08 NOTE — PROGRESS NOTES
Sleep Study Documentation    Pre-Sleep Study       Sleep testing procedure explained to patient:YES    Patient napped prior to study:NO    Caffeine:Dayshift worker after 12PM.  Caffeine use:YES- coffee  6 to 18 ounces    Alcohol:Dayshift workers after 5PM: Alcohol use:NO    Typical day for patient:YES       Study Documentation    Sleep Study Indications: Snoring, Headache     Sleep Study: Diagnostic   Snore:Mild  Minimum SaO2 85%  Baseline SaO2 96%      EKG abnormalities: no     EEG abnormalities: no    Were abnormal behaviors in sleep observed:NO    Is Total Sleep Study Recording Time < 2 hours: N/A    Is Total Sleep Study Recording Time > 2 hours but study is incomplete: N/A    Is Total Sleep Study Recording Time 6 hours or more but sleep was not obtained: NO    Patient classification: employed       Post-Sleep Study    Medication used at bedtime or during sleep study:NO    Patient reports time it took to fall asleep:20 to 30 minutes    Patient reports waking up during study:3 or more times.  Patient reports returning to sleep without difficulty.    Patient reports sleeping 4 to 6 hours without dreaming.    Does the Patient feel this is a typical night of sleep:typical    Patient rated sleepiness: Not sleepy or tired    PAP treatment:no.

## 2024-11-14 ENCOUNTER — TELEPHONE (OUTPATIENT)
Dept: SLEEP CENTER | Facility: CLINIC | Age: 39
End: 2024-11-14

## 2024-11-14 DIAGNOSIS — G47.33 OSA (OBSTRUCTIVE SLEEP APNEA): ICD-10-CM

## 2024-11-14 DIAGNOSIS — R53.83 FATIGUE, UNSPECIFIED TYPE: Primary | ICD-10-CM

## 2024-11-14 NOTE — TELEPHONE ENCOUNTER
----- Message from Brad Meza MD sent at 11/13/2024 12:03 PM EST -----  Attn Dr. Hayes & Sleep Medicine Clinical Pool:    Patient underwent a PSG which revealed severe obstructive sleep apnea. Auto-CPAP and sleep medicine follow up is recommended.

## 2024-11-14 NOTE — TELEPHONE ENCOUNTER
Thank you Remedios,     I have placed the orders.     1. Fatigue, unspecified type (Primary)    - Ambulatory Referral to Sleep Medicine; Future    2. PRANAY (obstructive sleep apnea)    - Ambulatory Referral to Sleep Medicine; Future        Rogelio Hayes MD.   Staff Neurologist  11/14/24   10:52 AM

## 2024-11-15 DIAGNOSIS — G47.33 OSA (OBSTRUCTIVE SLEEP APNEA): Primary | ICD-10-CM

## 2024-11-15 DIAGNOSIS — G43.909 MIGRAINE WITHOUT STATUS MIGRAINOSUS, NOT INTRACTABLE, UNSPECIFIED MIGRAINE TYPE: ICD-10-CM

## 2025-01-13 ENCOUNTER — TELEPHONE (OUTPATIENT)
Dept: BARIATRICS | Facility: CLINIC | Age: 40
End: 2025-01-13

## 2025-01-15 ENCOUNTER — TELEPHONE (OUTPATIENT)
Age: 40
End: 2025-01-15

## 2025-01-15 NOTE — TELEPHONE ENCOUNTER
Patient called in to schedule f/u with dr Hayes in [place]    I did let her know he works primarily out of [place] but she would still like keep going to [place]     Scheduled OVL with Dr. Zamora 3/6/25 for this follow up.    Thank you

## 2025-03-06 ENCOUNTER — OFFICE VISIT (OUTPATIENT)
Dept: NEUROLOGY | Facility: CLINIC | Age: 40
End: 2025-03-06
Payer: MEDICARE

## 2025-03-06 VITALS
HEART RATE: 78 BPM | DIASTOLIC BLOOD PRESSURE: 78 MMHG | OXYGEN SATURATION: 96 % | SYSTOLIC BLOOD PRESSURE: 100 MMHG | HEIGHT: 60 IN | WEIGHT: 222 LBS | BODY MASS INDEX: 43.59 KG/M2

## 2025-03-06 DIAGNOSIS — G43.811 OTHER MIGRAINE WITH STATUS MIGRAINOSUS, INTRACTABLE: ICD-10-CM

## 2025-03-06 DIAGNOSIS — G43.001 MIGRAINE WITHOUT AURA AND WITH STATUS MIGRAINOSUS, NOT INTRACTABLE: Primary | ICD-10-CM

## 2025-03-06 DIAGNOSIS — R20.0 RIGHT UPPER EXTREMITY NUMBNESS: ICD-10-CM

## 2025-03-06 PROCEDURE — 99215 OFFICE O/P EST HI 40 MIN: CPT | Performed by: PSYCHIATRY & NEUROLOGY

## 2025-03-06 RX ORDER — GABAPENTIN 300 MG/1
300 CAPSULE ORAL
Qty: 30 CAPSULE | Refills: 0 | Status: SHIPPED | OUTPATIENT
Start: 2025-03-06

## 2025-03-06 NOTE — PROGRESS NOTES
Name: Lucina Barrett      : 1985      MRN: [id number]  Encounter Provider: Dsuty Zamora DO  Encounter Date: 3/6/2025   Encounter department: [facility]  :  Assessment & Plan  Migraine without aura and with status migrainosus, not intractable  39-year-old female presenting with migraine headache follow-up at initially began 7 to 8 years ago gradually with noted increase in frequency and without improvement over the last year. Was seen last by Dr. Hayes in 2024 in which she was initiated on propranolol 80 mg along with Imitrex 50 mg for preventative and abortive therapy respectively. Since her LOV she endorses improvement of headache frequency noting 2-5 headache days per month. States that she no longer is taking propranolol after self discontinuation but continues with Imitrex PRN which has been helpful.    Medications: Current/Past/Contradicted   Preventative Therapies   Current None    Past Propranolol (Inderal)  Topiramate (Topamax)    Contraindicated N/A      Abortive Therapies   Current  Triptans - Sumatriptan (Imitrex) 25 mg   Past  None   Contraindicated  None     Previous Workup: No prior imaging    Impression: Migraine Headache; without aura; not intractable; with status migrainosus    Plan:  Differential diagnoses and next steps reviewed with the patient  Additional Workup: MRI brain with and without contrast, MRI cervical spine with and without contrast given new onset right upper extremity weakness/numbness to rule out CNS demyelinating disease  Medical Therapies:  Cycle Breaker:  Not indicated  Clinic Administered Abortives: None  Headache Abortive: Continue with sumatriptan (Imitrex) 25 mg   Headache Preventative:  Riboflavin (Vitamin B2), Magnesium Oxide (Mag-Ox)   Healthy lifestyle modifications were also discussed with the patient and written instructions were given  Discussed the potential side effects of the current medications,  patient was understanding and agreeable  Follow-up visit in 3 months, or sooner as needed should symptoms worsen or fail to respond to treatment plan as outlined       Right upper extremity numbness  Patient with new onset subacute right upper extremity numbness with associated subjective weakness that began 2 to 3 weeks ago with no identifying precipitating events or recent trauma. On exam motor strength testing reveals 4/5 RUE strength, decreased light touch, pinprick, and temperature of the right extremity, and bilateral patellar hyper reflexia with 3+ DTRs. Given patient's age and demographic we will obtain further diagnostic imaging to rule out presence of CNS demyelination to account for her symptoms. Will also start gabapentin 300 mg QHS given associated pain. Will also place referral for physical therapy.  Orders:    MRI brain with and without contrast; Future    MRI cervical spine with and without contrast; Future    gabapentin (Neurontin) 300 mg capsule; Take 1 capsule (300 mg total) by mouth daily at bedtime    Ambulatory Referral to Physical Therapy; Future        History of Present Illness   Headache   Ms. Barrett is a 39 y.o. female with a pertinent past medical history of chronic migraines, renal stones, and obesity who presents to the outpatient neurology clinic for follow-up office visit for ongoing evaluation of her headaches. She was last seen by Dr. Hayes on 7/11/2024.    Patient with history of headaches that began 7 to 8 years ago gradually but over the last year are noted to be increasing in frequency and not improving. Headaches located to the right side unilaterally, occipital, temporal, parietal, and retro-orbital. Endorses radiation to her neck. Quality of pain described as sharp and shooting. Intensity of her headaches at present 0/10, at its worst 10/10. Headache duration last over 2 to 9 days. And occur 2-9 times per month. Will occasionally have ordered consisting of tension behind the  eye. Denies nausea/vomiting but endorses photophobia and osmophobia. No clear identifiable triggers. Denies any current positional components. Denies any specific time of day. No family history of migraine headaches. Denies prior neck or head trauma.    Due to ongoing headaches was seen by Dr. Hayes regularly on 2/19/2024 and was continued on Imitrex and initiated on propranolol. Noted improvement with propranolol at her last office visit on 7/11/2024.    Today: She endorses that her headaches have improved and are overall well-controlled. Notes anywhere from 2-5 headaches per month. States that she has not been taking propranolol but does use Imitrex occasionally for abortive treatment which has been helpful. However today she endorses new complaint of acute onset right upper extremity pain/numbness. She reports that she began experiencing pain to her right upper extremity located from her shoulder with extension distally. States that pain radiates to her right posterior shoulder region. Denies any traumatic events or sleeping on it incorrectly. States that shortly after she began experiencing numbness radiating down into her hand. Symptoms have been persistent and gradually worsening. Denies any relieving factors. States that he also has mild weakness associated with her right extremity and will have to use her left hand to lift her right arm at times. Also endorses acute on chronic exacerbation of right lower extremity pain/paresthesias originating posteriorly down the posterior lateral aspect of her right lower extremity terminating in the region of her popliteal fossa.    Review of Systems   Constitutional:  Negative for chills, fatigue and fever.   HENT:  Negative for drooling, hearing loss, rhinorrhea, sinus pressure, sinus pain, tinnitus, trouble swallowing and voice change.    Eyes:  Negative for photophobia and visual disturbance.   Respiratory:  Negative for apnea, cough and shortness of breath.     Cardiovascular:  Negative for chest pain, palpitations and leg swelling.   Gastrointestinal:  Negative for abdominal pain, constipation, diarrhea, nausea and vomiting.   Endocrine: Negative for cold intolerance and heat intolerance.   Genitourinary:  Negative for difficulty urinating.   Musculoskeletal:  Negative for gait problem, myalgias, neck pain and neck stiffness.   Skin:  Negative for pallor and rash.   Neurological:  Positive for weakness, numbness and headaches. Negative for dizziness, tremors, seizures, syncope, facial asymmetry, speech difficulty and light-headedness.   Psychiatric/Behavioral:  Negative for agitation, behavioral problems, confusion, decreased concentration and sleep disturbance.    All other systems reviewed and are negative.   I have personally reviewed the MA's review of systems and made changes as necessary.    Medical History Reviewed by provider this encounter:     .  Past Medical History   Past Medical History:   Diagnosis Date    Fetal macrosomia 2022    Gallbladder attack     History of  delivery, currently pregnant 2022    3 spontaneous  deliveries, earliest at 22 weeks 35 weeks  S/p cervical length surveillance through M    Kidney stones     Migraines     Varicella     Von Willebrand disease (HCC)     patient is carrier     Past Surgical History:   Procedure Laterality Date    EXAMINATION UNDER ANESTHESIA N/A 2023    Procedure: EXAM UNDER ANESTHESIA (EUA);  Surgeon: Sun Ayala MD;  Location: [facility];  Service: Gynecology    LAPAROSCOPIC CHOLECYSTECTOMY      LITHOTRIPSY      MN LAPAROSCOPY W/RMVL ADNEXAL STRUCTURES Bilateral 2023    Procedure: SALPINGECTOMY, LAPAROSCOPIC, Examine under Anesthesia;  Surgeon: Sun Ayala MD;  Location: [facility];  Service: Gynecology    TONSILLECTOMY      TONSILLECTOMY       Family History   Problem Relation Age of Onset    No Known Problems Mother     No Known Problems Father     No Known  Problems Sister     No Known Problems Brother     No Known Problems Daughter     No Known Problems Son     No Known Problems Maternal Grandfather     No Known Problems Sister     No Known Problems Sister     No Known Problems Daughter       reports that she has never smoked. She has never used smokeless tobacco. She reports that she does not drink alcohol and does not use drugs.  Current Outpatient Medications   Medication Instructions    Diclofenac Sodium (VOLTAREN) 2 g, Topical, 4 times daily, For pain to affected area    gabapentin (NEURONTIN) 300 mg, Oral, Daily at bedtime    lidocaine (LIDODERM) 5 % 1 patch, Topical, Every 24 hours, Remove & Discard patch within 12 hours or as directed by MD    methocarbamol (ROBAXIN) 500 mg, Oral, 4 times daily    propranolol (INDERAL) 40 mg tablet START WITH 1 TABLET BY MOUTH AT BED TIME. INCREASE THE DOSE TO 2 TABLETS IN 1 WEEK, IF NO RELIEF.    SUMAtriptan (IMITREX) 50 mg, Oral, Once as needed     Allergies   Allergen Reactions    Latex Anaphylaxis, Hives and Anxiety     Category: Allergy; Annotation - [id number]: HIVES      Current Outpatient Medications on File Prior to Visit   Medication Sig Dispense Refill    Diclofenac Sodium (VOLTAREN) 1 % Apply 2 g topically 4 (four) times a day For pain to affected area 100 g 0    lidocaine (LIDODERM) 5 % APPLY 1 PATCH TOPICALLY OVER 12 HOURS EVERY 24 HOURS REMOVE & DISCARD PATCH WITHIN 12 HOURS OR AS DIRECTED BY MD 15 patch 0    SUMAtriptan (IMITREX) 50 mg tablet Take 1 tablet (50 mg total) by mouth once as needed for migraine (If it does not work over 2h can take a second tablet) for up to 90 doses 8 tablet 5    methocarbamol (ROBAXIN) 500 mg tablet Take 1 tablet (500 mg total) by mouth 4 (four) times a day for 7 days (Patient not taking: Reported on 3/6/2025) 28 tablet 0    propranolol (INDERAL) 40 mg tablet START WITH 1 TABLET BY MOUTH AT BED TIME. INCREASE THE DOSE TO 2 TABLETS IN 1 WEEK, IF NO RELIEF. (Patient not taking:  Reported on 3/6/2025) 90 tablet 3     No current facility-administered medications on file prior to visit.      Social History     Tobacco Use    Smoking status: Never    Smokeless tobacco: Never   Vaping Use    Vaping status: Never Used   Substance and Sexual Activity    Alcohol use: No    Drug use: No    Sexual activity: Yes     Partners: Male     Birth control/protection: None        Objective   /78 (BP Location: Left arm, Patient Position: Sitting, Cuff Size: Extra-Large)   Pulse 78   Ht 5' (1.524 m)   Wt 101 kg (222 lb)   SpO2 96%   Breastfeeding No   BMI 43.36 kg/m²   GENERAL EXAM:  General Appearance: in no apparent distress, well developed and well nourished, non-toxic, and in no respiratory distress and acyanotic  HENT: Normocephalic and atraumatic. Nose and Ears normal.   Neck: Full range of motion with no pain or limitations in flexion, extension, lateral flexion and rotation  Cardiovascular: Distal extremities warm without palpable edema or tenderness, no observed significant swelling.   Pulmonary: Pulmonary effort is normal. Not in respiratory distress  Musculoskeletal: No swelling or deformity.  Skin: Warm and dry  Psychiatric: Normal behavior and appropriate affect     NEUROLOGIC  EXAM:  MENTAL STATUS:   Alertness: alert  Orientation: time, date, person, place  Speech/Language normal rate, tone and rhythm and coherent speech  Commands: Can follow multistep commands  Current and Remote Memory:intact  Affect: normal  Thought content exhibits logical connections    CRANIAL NERVES:  I Not tested   II Visual Fields normal   II, Ill,  IV, VI Pupils equal, round, reactive to light and accommodation  EOM full and intact   V facial sensation was normal and symmetrical   VII facial symmetry equal   VIII Normal hearing to speech   IX, X Normal Palatal Elevation, No Uvular Deviation   XI Shoulder shrug and head turn is normal   XII Midline tongue protrusion      MOTOR:   Pronator Drift -  Absent  Atrophy - No atrophy or muscle wasting  Normal Tone  No Involuntary Movements  No Tremors Appreciated  Strength testin/5 proximal right upper extremity motor weakness, 5/5 otherwise    REFLEXES:   DTRs Normal - Except for: DTR 3 out of 4 of the patellar bilateral, Ankle Clonus: Absent, Babinski: Bilateral toes downgoing, and Burrows Sign: Absent    SENSORY:  Decrease light touch and pinprick to the right upper extremity    COORDINATION:   Fast Alternative Movements:  R - Intact, L - Intact  Finger To Nose:  R - Intact, L - Intact, Heel To Shin: R - Intact, L - Intact  Romberg Test: normal    STATION/GAIT: Assistive Device - None, Rises from Chair - Without Assistance, Normal Gait, Base - Normal, Stride Length - Normal, Jennifer - Normal, Arm Swing - Normal, Turn Around - Normal, Normal Tandem Gait, and Normal Heel/Toe Walking    Radiology Results Review: I have reviewed the following images/report studies in PACS: No results found.

## 2025-03-07 ENCOUNTER — TELEPHONE (OUTPATIENT)
Age: 40
End: 2025-03-07

## 2025-03-07 NOTE — TELEPHONE ENCOUNTER
pt called message from pharm that insurance will not cover medication that was prescribed at appt yesterday.     Per chart, pt was prescribed gabapentin.  Advised I would have to call pharm to get clarification     [phone] to leave detailed message     Called pharm, He states that they may have had to change the NDC to the one that is covered.  he was able to process this and will get it ready for pt.      Called pt and made her aware of above

## 2025-03-12 NOTE — ASSESSMENT & PLAN NOTE
39-year-old female presenting with migraine headache follow-up at initially began 7 to 8 years ago gradually with noted increase in frequency and without improvement over the last year. Was seen last by Dr. Hayes in August 2024 in which she was initiated on propranolol 80 mg along with Imitrex 50 mg for preventative and abortive therapy respectively. Since her LOV she endorses improvement of headache frequency noting 2-5 headache days per month. States that she no longer is taking propranolol after self discontinuation but continues with Imitrex PRN which has been helpful.    Medications: Current/Past/Contradicted   Preventative Therapies   Current None    Past Propranolol (Inderal)  Topiramate (Topamax)    Contraindicated N/A      Abortive Therapies   Current  Triptans - Sumatriptan (Imitrex) 25 mg   Past  None   Contraindicated  None     Previous Workup: No prior imaging    Impression: Migraine Headache; without aura; not intractable; with status migrainosus    Plan:  Differential diagnoses and next steps reviewed with the patient  Additional Workup: MRI brain with and without contrast, MRI cervical spine with and without contrast given new onset right upper extremity weakness/numbness to rule out CNS demyelinating disease  Medical Therapies:  Cycle Breaker: Not indicated  Clinic Administered Abortives: None  Headache Abortive: Continue with sumatriptan (Imitrex) 25 mg   Headache Preventative:  Riboflavin (Vitamin B2), Magnesium Oxide (Mag-Ox)   Healthy lifestyle modifications were also discussed with the patient and written instructions were given  Discussed the potential side effects of the current medications, patient was understanding and agreeable  Follow-up visit in 3 months, or sooner as needed should symptoms worsen or fail to respond to treatment plan as outlined

## 2025-04-05 ENCOUNTER — HOSPITAL ENCOUNTER (OUTPATIENT)
Dept: MRI IMAGING | Facility: HOSPITAL | Age: 40
Discharge: HOME | End: 2025-04-05
Payer: MEDICARE

## 2025-04-05 DIAGNOSIS — R20.0 RIGHT UPPER EXTREMITY NUMBNESS: ICD-10-CM

## 2025-04-05 PROCEDURE — 72156 MRI NECK SPINE W/O & W/DYE: CPT

## 2025-04-05 PROCEDURE — 70553 MRI BRAIN STEM W/O & W/DYE: CPT

## 2025-04-05 PROCEDURE — A9585 GADOBUTROL INJECTION: HCPCS

## 2025-04-05 RX ORDER — GADOBUTROL 604.72 MG/ML
10 INJECTION INTRAVENOUS
Status: COMPLETED | OUTPATIENT
Start: 2025-04-05 | End: 2025-04-05

## 2025-04-05 RX ADMIN — GADOBUTROL 10 ML: 604.72 INJECTION INTRAVENOUS at 12:08

## 2025-05-14 ENCOUNTER — TELEPHONE (OUTPATIENT)
Age: 40
End: 2025-05-14

## 2025-05-14 NOTE — TELEPHONE ENCOUNTER
Pt called to schedule a f/u appt for . There are no available appts at this time.       Please assist            Pt was also looking to have mri results read to her.

## 2025-05-14 NOTE — TELEPHONE ENCOUNTER
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

## 2025-06-23 ENCOUNTER — OFFICE VISIT (OUTPATIENT)
Dept: FAMILY MEDICINE CLINIC | Facility: CLINIC | Age: 40
End: 2025-06-23
Payer: MEDICARE

## 2025-06-23 VITALS
HEART RATE: 92 BPM | BODY MASS INDEX: 43.39 KG/M2 | TEMPERATURE: 98 F | WEIGHT: 221 LBS | DIASTOLIC BLOOD PRESSURE: 71 MMHG | OXYGEN SATURATION: 98 % | HEIGHT: 60 IN | SYSTOLIC BLOOD PRESSURE: 130 MMHG

## 2025-06-23 DIAGNOSIS — G89.29 CHRONIC BILATERAL LOW BACK PAIN WITHOUT SCIATICA: Primary | ICD-10-CM

## 2025-06-23 DIAGNOSIS — M25.551 RIGHT HIP PAIN: ICD-10-CM

## 2025-06-23 DIAGNOSIS — R29.898 WEAKNESS OF LEFT HIP: ICD-10-CM

## 2025-06-23 DIAGNOSIS — M54.50 CHRONIC BILATERAL LOW BACK PAIN WITHOUT SCIATICA: Primary | ICD-10-CM

## 2025-06-23 PROCEDURE — 99213 OFFICE O/P EST LOW 20 MIN: CPT

## 2025-06-23 RX ORDER — GABAPENTIN 100 MG/1
CAPSULE ORAL
Qty: 49 CAPSULE | Refills: 0 | Status: SHIPPED | OUTPATIENT
Start: 2025-06-23 | End: 2025-07-14

## 2025-06-23 RX ORDER — LIDOCAINE 50 MG/G
1 PATCH TOPICAL EVERY 24 HOURS
Qty: 15 PATCH | Refills: 0 | Status: SHIPPED | OUTPATIENT
Start: 2025-06-23

## 2025-06-23 NOTE — ASSESSMENT & PLAN NOTE
Patient reports unchanged intermittent chronic b/l lower back pain w/o sciatica, reproduced upon standing, bending over, and walking. Patient declined using oral medications including NSAIDs and tylenol. No hx of trauma to lower back. Pain is not relieved by PT, OMT.    Plan:  Continue Symptomatic pain relief with prn lidocaine patches, patient counseled on using prn Voltaren gel for further symptom relief   Patient hesitant to use gabapentin due to concern over side effects affecting childcare and employment, discussed side effects, dosage timing, and benefits/risks of medication.  Recommend using 100mg gabapentin at bedtime for 7 days and titrating up to 300mg   Follow up in 4 weeks   Encouraged to continue with home exercises   Follow-up XR lumbar spine to assess for bony deformity, disc pathology in setting of chronic pain   Follow with neurology for MRI results, appreciate recommendations for further imaging    Orders:    lidocaine (LIDODERM) 5 %; Apply 1 patch topically every 24 hours over 12 hours Remove & Discard patch within 12 hours or as directed by MD GRIFFITH spine lumbar minimum 4 views non injury; Future    gabapentin (NEURONTIN) 100 mg capsule; Take 1 capsule (100 mg total) by mouth daily at bedtime for 7 days, THEN 3 capsules (300 mg total) daily at bedtime for 14 days.

## 2025-06-23 NOTE — PROGRESS NOTES
Name: Lucina Barrett      : 1985      MRN: [id number]  Encounter Provider: Amelia Robles DO  Encounter Date: 2025   Encounter department: [facility]  :  Assessment & Plan  Chronic bilateral low back pain without sciatica  Patient reports unchanged intermittent chronic b/l lower back pain w/o sciatica, reproduced upon standing, bending over, and walking. Patient declined using oral medications including NSAIDs and tylenol. No hx of trauma to lower back. Pain is not relieved by PT, OMT.    Plan:  Continue Symptomatic pain relief with prn lidocaine patches, patient counseled on using prn Voltaren gel for further symptom relief   Patient hesitant to use gabapentin due to concern over side effects affecting childcare and employment, discussed side effects, dosage timing, and benefits/risks of medication.  Recommend using 100mg gabapentin at bedtime for 7 days and titrating up to 300mg   Follow up in 4 weeks   Encouraged to continue with home exercises   Follow-up XR lumbar spine to assess for bony deformity, disc pathology in setting of chronic pain   Follow with neurology for MRI results, appreciate recommendations for further imaging    Orders:    lidocaine (LIDODERM) 5 %; Apply 1 patch topically every 24 hours over 12 hours Remove & Discard patch within 12 hours or as directed by MD    XR spine lumbar minimum 4 views non injury; Future    gabapentin (NEURONTIN) 100 mg capsule; Take 1 capsule (100 mg total) by mouth daily at bedtime for 7 days, THEN 3 capsules (300 mg total) daily at bedtime for 14 days.    Right hip pain  Pt reports new-onset right hip pain, likely MSK in origin. Suspect hamstring etiology 2/2 chronic lower back pain v. compensation due to left hip weakness and deconditioning.  Last PT visit in , recommend revisiting PT for new-onset right hip pain.   May use NSAIDS PRN vs Voltaren gel       Weakness of left hip  Pt reports left hip weakness  unchanged from previous visit. Suspect etiology 2/2 chronic lower back pain  Follow with XR L. hip/pelvis 2-3 views as ordered on 9/17/24         History of Present Illness   Lucina Barrett is a 39 y.o. female who presents with PMHx of lumbar back pain, left hip pain, BMI>40 who presents to office follow up for chronic conditions. Patient states that her chronic lumbar back pain, left hip pain is unchanged despite symptomatic improvement with lidocaine patches. Patient reports new onset right hip pain, stating it is an intermittent tight, straining pain which is most appreciated in right popliteal fossa. She denies loss of sensation, sciatic pain, crepitus, or popping of the R. Hip joint.     Patient reports no improvement of any chronic symptoms with PT and OMT. Patient last went to PT in 2023. Patient denies worsening lower back pain symptoms, denies bowel/bladder incontinence, sciatic pain, pain radiating down lower extremities, gait abnormalities.         Review of Systems   Constitutional:  Negative for activity change and fatigue.   Eyes:  Negative for pain, discharge and itching.   Respiratory:  Negative for apnea, cough, chest tightness and shortness of breath.    Cardiovascular:  Negative for chest pain and leg swelling.   Gastrointestinal:  Negative for abdominal distention and abdominal pain.   Genitourinary:  Negative for difficulty urinating and urgency.   Musculoskeletal:  Positive for arthralgias and back pain.   Skin:  Negative for color change and wound.   Neurological:  Positive for weakness (L. hip). Negative for dizziness, light-headedness, numbness and headaches.   Psychiatric/Behavioral:  Negative for agitation, behavioral problems and confusion. The patient is not nervous/anxious.    All other systems reviewed and are negative.      Objective   /71 (BP Location: Left arm, Patient Position: Sitting, Cuff Size: Large)   Pulse 92   Temp 98 °F (36.7 °C) (Temporal)   Ht 5' (1.524  m)   Wt 100 kg (221 lb)   SpO2 98%   BMI 43.16 kg/m²      Physical Exam  Vitals reviewed.   Constitutional:       Appearance: Normal appearance.   HENT:      Head: Normocephalic and atraumatic.      Right Ear: External ear normal.      Left Ear: External ear normal.      Nose: Nose normal.      Mouth/Throat:      Mouth: Mucous membranes are moist.      Pharynx: Oropharynx is clear.     Cardiovascular:      Rate and Rhythm: Normal rate and regular rhythm.      Pulses: Normal pulses.      Heart sounds: Normal heart sounds.   Pulmonary:      Effort: Pulmonary effort is normal. No respiratory distress.      Breath sounds: Normal breath sounds. No stridor. No wheezing.   Abdominal:      General: Abdomen is flat. There is no distension.      Palpations: Abdomen is soft.     Musculoskeletal:      Cervical back: Normal.      Thoracic back: Normal.      Lumbar back: No signs of trauma, spasms, tenderness or bony tenderness. No scoliosis.      Right hip: No tenderness, bony tenderness or crepitus. Normal strength (5/5).      Left hip: No tenderness, bony tenderness or crepitus. Decreased strength (4/5 strength).      Right lower leg: No edema.      Left lower leg: No edema.      Comments: No pain with palpation of greater trochanter bilaterally.      Skin:     General: Skin is warm and dry.     Neurological:      Mental Status: She is alert and oriented to person, place, and time. Mental status is at baseline.     Psychiatric:         Mood and Affect: Mood normal.         Behavior: Behavior normal.         Thought Content: Thought content normal.         Judgment: Judgment normal.       Marcel Casanova, MS4  Amelia Robles, PGY2

## 2025-06-23 NOTE — ASSESSMENT & PLAN NOTE
Pt reports left hip weakness unchanged from previous visit. Suspect etiology 2/2 chronic lower back pain  Follow with XR L. hip/pelvis 2-3 views as ordered on 9/17/24

## 2025-06-25 ENCOUNTER — TELEPHONE (OUTPATIENT)
Dept: FAMILY MEDICINE CLINIC | Facility: CLINIC | Age: 40
End: 2025-06-25

## 2025-07-17 DIAGNOSIS — G43.909 MIGRAINE WITHOUT STATUS MIGRAINOSUS, NOT INTRACTABLE, UNSPECIFIED MIGRAINE TYPE: ICD-10-CM

## 2025-07-17 NOTE — TELEPHONE ENCOUNTER
Medication: SUMAtriptan (IMITREX) 50 mg tablet     Dose/Frequency: Take 1 tablet (50 mg total) by mouth once as needed for migraine (If it does not work over 2h can take a second tablet) for up to 90 doses     Quantity: 8 tablet (8 day supply)     Pharmacy: [facility]/pharmacy #[id number] [place] [address]  Phone: [phone]  Fax: [phone]  EFFIE #: [id number]     Office:   [] PCP/Provider -   [x] Speciality/Provider - Rogelio Hayes MD     Does the patient have enough for 3 days?   [x] Yes   [] No - Send as HP to POD

## 2025-07-18 DIAGNOSIS — M54.50 CHRONIC BILATERAL LOW BACK PAIN WITHOUT SCIATICA: ICD-10-CM

## 2025-07-18 DIAGNOSIS — G89.29 CHRONIC BILATERAL LOW BACK PAIN WITHOUT SCIATICA: ICD-10-CM

## 2025-07-18 RX ORDER — SUMATRIPTAN 50 MG/1
50 TABLET, FILM COATED ORAL ONCE AS NEEDED
Qty: 8 TABLET | Refills: 0 | Status: SHIPPED | OUTPATIENT
Start: 2025-07-18

## 2025-07-19 ENCOUNTER — HOSPITAL ENCOUNTER (OUTPATIENT)
Dept: RADIOLOGY | Facility: HOSPITAL | Age: 40
Discharge: HOME | End: 2025-07-19
Payer: MEDICARE

## 2025-07-19 DIAGNOSIS — M25.552 LEFT HIP PAIN: ICD-10-CM

## 2025-07-19 DIAGNOSIS — M54.50 CHRONIC BILATERAL LOW BACK PAIN WITHOUT SCIATICA: ICD-10-CM

## 2025-07-19 DIAGNOSIS — G89.29 CHRONIC BILATERAL LOW BACK PAIN WITHOUT SCIATICA: ICD-10-CM

## 2025-07-19 PROCEDURE — 73502 X-RAY EXAM HIP UNI 2-3 VIEWS: CPT

## 2025-07-19 PROCEDURE — 72110 X-RAY EXAM L-2 SPINE 4/>VWS: CPT

## 2025-07-22 ENCOUNTER — OFFICE VISIT (OUTPATIENT)
Dept: FAMILY MEDICINE CLINIC | Facility: CLINIC | Age: 40
End: 2025-07-22
Payer: MEDICARE

## 2025-07-22 VITALS
BODY MASS INDEX: 41.35 KG/M2 | OXYGEN SATURATION: 98 % | HEART RATE: 79 BPM | WEIGHT: 219 LBS | HEIGHT: 61 IN | SYSTOLIC BLOOD PRESSURE: 104 MMHG | TEMPERATURE: 98 F | DIASTOLIC BLOOD PRESSURE: 76 MMHG

## 2025-07-22 DIAGNOSIS — G89.29 CHRONIC BILATERAL LOW BACK PAIN WITH SCIATICA, SCIATICA LATERALITY UNSPECIFIED: Primary | ICD-10-CM

## 2025-07-22 DIAGNOSIS — M54.41 CHRONIC BILATERAL LOW BACK PAIN WITH SCIATICA, SCIATICA LATERALITY UNSPECIFIED: Primary | ICD-10-CM

## 2025-07-22 DIAGNOSIS — G57.01 PIRIFORMIS SYNDROME OF RIGHT SIDE: ICD-10-CM

## 2025-07-22 DIAGNOSIS — M54.42 CHRONIC BILATERAL LOW BACK PAIN WITH SCIATICA, SCIATICA LATERALITY UNSPECIFIED: Primary | ICD-10-CM

## 2025-07-22 PROCEDURE — 99213 OFFICE O/P EST LOW 20 MIN: CPT

## 2025-07-22 RX ORDER — GABAPENTIN 100 MG/1
CAPSULE ORAL
Qty: 49 CAPSULE | Refills: 0 | Status: SHIPPED | OUTPATIENT
Start: 2025-07-22 | End: 2025-08-12

## 2025-07-22 RX ORDER — GABAPENTIN 100 MG/1
CAPSULE ORAL
Qty: 49 CAPSULE | Refills: 0 | OUTPATIENT
Start: 2025-07-22

## 2025-07-22 NOTE — ASSESSMENT & PLAN NOTE
-Patient reports chronic back pain for 2 to 3 years. She endorses occasional pain radiating down to left hip and posterior right leg.   - significant paraspinal hypertonicity noted on exam bilaterally on lumbar spine (no bony setoffs)  -no hx of trauma. Patient BMI of 42  - nerve root testing in tact with 5/5 muscle strength and +2 reflexes  -no red flag symptoms  Hip exam unremarkable do not believe there is any acute hip pathology    I suspect low back pain is mechanical and msk spasm related without nerve root pathology on exam. Significant piriformis tenderness on right which could be contribiting to occasional radicular symptoms     Plan  Due to length of symptoms will order mri lumbar spine  No acute pathology on my read of hip and lumbar xray (await official read)  Continue with conservative management while awaiting mri   Comprehensive spine PT   OMT   Gabapentin, tylenol, ibuprofen and robaxin    If lumbar pathology can consider pain medicine referral at that time     Orders:    Ambulatory Referral to Comprehensive Spine PT; Future    MRI lumbar spine wo contrast; Future    gabapentin (NEURONTIN) 100 mg capsule; Take 1 capsule (100 mg total) by mouth daily at bedtime for 7 days, THEN 3 capsules (300 mg total) daily at bedtime for 14 days.

## 2025-07-22 NOTE — PROGRESS NOTES
Name: Lucina Barrett      : 1985      MRN: [id number]  Encounter Provider: Calvin Null DO  Encounter Date: 2025   Encounter department: [facility]  :  Assessment & Plan  Chronic bilateral low back pain with sciatica, sciatica laterality unspecified  Piriformis syndrome of right side  -Patient reports chronic back pain for 2 to 3 years. She endorses occasional pain radiating down to left hip and posterior right leg.   - significant paraspinal hypertonicity noted on exam bilaterally on lumbar spine (no bony setoffs)  -no hx of trauma. Patient BMI of 42  - nerve root testing in tact with 5/5 muscle strength and +2 reflexes  -no red flag symptoms  Hip exam unremarkable do not believe there is any acute hip pathology    I suspect low back pain is mechanical and msk spasm related without nerve root pathology on exam. Significant piriformis tenderness on right which could be contribiting to occasional radicular symptoms     Plan  Due to length of symptoms will order mri lumbar spine  No acute pathology on my read of hip and lumbar xray (await official read)  Continue with conservative management while awaiting mri   Comprehensive spine PT   OMT   Gabapentin, tylenol, ibuprofen and robaxin    If lumbar pathology can consider pain medicine referral at that time     Orders:    Ambulatory Referral to Comprehensive Spine PT; Future    MRI lumbar spine wo contrast; Future    gabapentin (NEURONTIN) 100 mg capsule; Take 1 capsule (100 mg total) by mouth daily at bedtime for 7 days, THEN 3 capsules (300 mg total) daily at bedtime for 14 days.           History of Present Illness   Patient pain is Acute on chronic in nature. Patient denies any recent trauma. She states it started 3 years ago after having her child.  Pain is exacerbated by physical activity, position, twisting/turning, and sleeping and bending forward.. Location of the pain diffuse lower back, gluteal region on  "right. Patient Reports radicular symptoms in s1 distrubution on right. Patient denies any weakness, bowel/bladder incontinence, saddle anesthesias.      Imagining: lumbar xray and hip xray; still without official read     Treatments tried physical therapy:, chiropractor/manipulation, medication: muscle relaxant: robaxin                 Review of Systems   Constitutional:  Negative for chills, fatigue and fever.   Respiratory:  Negative for chest tightness, shortness of breath and wheezing.    Cardiovascular:  Negative for chest pain.   Gastrointestinal:  Negative for abdominal pain, blood in stool, diarrhea and nausea.   Genitourinary:  Negative for difficulty urinating and dysuria.   Musculoskeletal:  Positive for back pain.   Neurological:  Negative for dizziness, weakness and light-headedness.   Psychiatric/Behavioral:  Negative for behavioral problems and suicidal ideas.        Objective   /76 (BP Location: Left arm, Patient Position: Sitting, Cuff Size: Large)   Pulse 79   Temp 98 °F (36.7 °C) (Tympanic)   Ht 5' 0.5\" (1.537 m)   Wt 99.3 kg (219 lb)   SpO2 98%   BMI 42.07 kg/m²      Physical Exam  Constitutional:       General: She is not in acute distress.     Appearance: She is not ill-appearing or toxic-appearing.   HENT:      Head: Normocephalic and atraumatic.      Right Ear: External ear normal.      Left Ear: External ear normal.      Nose: No congestion or rhinorrhea.      Mouth/Throat:      Pharynx: No oropharyngeal exudate or posterior oropharyngeal erythema.     Eyes:      Conjunctiva/sclera: Conjunctivae normal.       Cardiovascular:      Rate and Rhythm: Normal rate and regular rhythm.      Pulses: Normal pulses.      Heart sounds: Normal heart sounds. No murmur heard.  Pulmonary:      Effort: Pulmonary effort is normal. No respiratory distress.      Breath sounds: Normal breath sounds. No wheezing or rales.   Abdominal:      General: Bowel sounds are normal. There is no distension.      " Palpations: Abdomen is soft.      Tenderness: There is no abdominal tenderness. There is no guarding.     Musculoskeletal:      Right lower leg: No edema.      Left lower leg: No edema.      Comments: Lumbar Spine Exam    Appearance:  Exaggerated lordosis  Palpation/Tenderness:  left lumbar paraspinal tenderness  right lumbar paraspinal tenderness  right piriformis tenderness  Sensory:  no sensory deficits noted  Motor Strength:  Left hip flexion:  5/5  Left hip extension:  5/5  Right hip flexion:  5/5  Right hip extension:  5/5  Left knee flexion:  5/5  Left knee extension:  5/5  Right knee flexion:  5/5  Right knee extension:  5/5  Left foot dorsiflexion:  5/5  Left foot plantar flexion:  5/5  Right foot dorsiflexion:  5/5  Right foot plantar flexion:  5/5  Reflexes:  Left Patellar:  2+   Right Patellar:  2+   Left Achilles:  2+   Right Achilles:  2+   Special Tests:  Right Piriformis Stretch Test:  positive         Skin:     General: Skin is warm.      Capillary Refill: Capillary refill takes less than 2 seconds.     Neurological:      General: No focal deficit present.      Mental Status: She is alert and oriented to person, place, and time. Mental status is at baseline.     Psychiatric:         Mood and Affect: Mood normal.         Behavior: Behavior normal.       Calvin Null, DO  PGY-3 Family Medicine

## 2025-07-23 ENCOUNTER — RESULTS FOLLOW-UP (OUTPATIENT)
Dept: FAMILY MEDICINE CLINIC | Facility: CLINIC | Age: 40
End: 2025-07-23

## 2025-08-09 ENCOUNTER — HOSPITAL ENCOUNTER (OUTPATIENT)
Dept: MRI IMAGING | Facility: HOSPITAL | Age: 40
Discharge: HOME | End: 2025-08-09
Payer: MEDICARE

## (undated) DEVICE — CHLORAPREP HI-LITE 26ML ORANGE

## (undated) DEVICE — INTENDED FOR TISSUE SEPARATION, AND OTHER PROCEDURES THAT REQUIRE A SHARP SURGICAL BLADE TO PUNCTURE OR CUT.: Brand: BARD-PARKER SAFETY BLADES SIZE 11, STERILE

## (undated) DEVICE — LAPAROSCOPIC TROCAR SLEEVE/SINGLE USE: Brand: KII® OPTICAL ACCESS SYSTEM

## (undated) DEVICE — TUBING SMOKE EVAC W/FILTRATION DEVICE PLUMEPORT ACTIV

## (undated) DEVICE — TROCAR: Brand: KII® SLEEVE

## (undated) DEVICE — ADHESIVE SKIN HIGH VISCOSITY EXOFIN 1ML

## (undated) DEVICE — ENSEAL X1 TISSUE SEALER, CURVED JAW, 37 CM SHAFT LENGTH: Brand: ENSEAL

## (undated) DEVICE — GLOVE INDICATOR PI UNDERGLOVE SZ 7.5 BLUE

## (undated) DEVICE — BETHLEHEM UNIVERSAL GYN LAP PK: Brand: CARDINAL HEALTH

## (undated) DEVICE — PREMIUM DRY TRAY LF: Brand: MEDLINE INDUSTRIES, INC.

## (undated) DEVICE — Device

## (undated) DEVICE — ENDOPOUCH RETRIEVER SPECIMEN RETRIEVAL BAGS: Brand: ENDOPOUCH RETRIEVER

## (undated) DEVICE — SUT VICRYL 0 UR-6 27 IN J603H

## (undated) DEVICE — SUT MONOCRYL 4-0 PS-2 18 IN Y496G

## (undated) DEVICE — ENDOPATH BIPOLAR FORCEPS WITH MACRO JAW: Brand: ENDOPATH

## (undated) DEVICE — GLOVE PI ULTRA TOUCH SZ.7.0